# Patient Record
Sex: MALE | Race: AMERICAN INDIAN OR ALASKA NATIVE | NOT HISPANIC OR LATINO | Employment: PART TIME | URBAN - METROPOLITAN AREA
[De-identification: names, ages, dates, MRNs, and addresses within clinical notes are randomized per-mention and may not be internally consistent; named-entity substitution may affect disease eponyms.]

---

## 2024-01-01 ENCOUNTER — TRANSFERRED RECORDS (OUTPATIENT)
Dept: MULTI SPECIALTY CLINIC | Facility: CLINIC | Age: 39
End: 2024-01-01

## 2024-01-01 LAB — RETINOPATHY: NORMAL

## 2024-03-11 ENCOUNTER — APPOINTMENT (OUTPATIENT)
Dept: CT IMAGING | Facility: CLINIC | Age: 39
End: 2024-03-11
Attending: STUDENT IN AN ORGANIZED HEALTH CARE EDUCATION/TRAINING PROGRAM
Payer: MEDICAID

## 2024-03-11 ENCOUNTER — APPOINTMENT (OUTPATIENT)
Dept: CT IMAGING | Facility: CLINIC | Age: 39
End: 2024-03-11
Attending: EMERGENCY MEDICINE
Payer: MEDICAID

## 2024-03-11 ENCOUNTER — HOSPITAL ENCOUNTER (EMERGENCY)
Facility: CLINIC | Age: 39
Discharge: HOME | End: 2024-03-11
Attending: STUDENT IN AN ORGANIZED HEALTH CARE EDUCATION/TRAINING PROGRAM | Admitting: STUDENT IN AN ORGANIZED HEALTH CARE EDUCATION/TRAINING PROGRAM
Payer: MEDICAID

## 2024-03-11 ENCOUNTER — APPOINTMENT (OUTPATIENT)
Dept: MRI IMAGING | Facility: CLINIC | Age: 39
End: 2024-03-11
Attending: STUDENT IN AN ORGANIZED HEALTH CARE EDUCATION/TRAINING PROGRAM
Payer: MEDICAID

## 2024-03-11 VITALS
OXYGEN SATURATION: 96 % | RESPIRATION RATE: 16 BRPM | BODY MASS INDEX: 30.5 KG/M2 | TEMPERATURE: 97.5 F | DIASTOLIC BLOOD PRESSURE: 88 MMHG | HEART RATE: 81 BPM | WEIGHT: 244.05 LBS | SYSTOLIC BLOOD PRESSURE: 133 MMHG

## 2024-03-11 DIAGNOSIS — R73.9 HYPERGLYCEMIA: ICD-10-CM

## 2024-03-11 DIAGNOSIS — R07.89 CHEST TIGHTNESS: ICD-10-CM

## 2024-03-11 DIAGNOSIS — R53.1 RIGHT SIDED WEAKNESS: ICD-10-CM

## 2024-03-11 DIAGNOSIS — R20.0 RIGHT SIDED NUMBNESS: ICD-10-CM

## 2024-03-11 LAB
ANION GAP SERPL CALCULATED.3IONS-SCNC: 14 MMOL/L (ref 7–15)
APTT PPP: 30 SECONDS (ref 22–38)
BASOPHILS # BLD AUTO: 0 10E3/UL (ref 0–0.2)
BASOPHILS NFR BLD AUTO: 1 %
BUN SERPL-MCNC: 18 MG/DL (ref 6–20)
CALCIUM SERPL-MCNC: 9.1 MG/DL (ref 8.6–10)
CHLORIDE SERPL-SCNC: 96 MMOL/L (ref 98–107)
CREAT SERPL-MCNC: 0.73 MG/DL (ref 0.67–1.17)
DEPRECATED HCO3 PLAS-SCNC: 22 MMOL/L (ref 22–29)
EGFRCR SERPLBLD CKD-EPI 2021: >90 ML/MIN/1.73M2
EOSINOPHIL # BLD AUTO: 0.1 10E3/UL (ref 0–0.7)
EOSINOPHIL NFR BLD AUTO: 1 %
ERYTHROCYTE [DISTWIDTH] IN BLOOD BY AUTOMATED COUNT: 13.1 % (ref 10–15)
GLUCOSE BLDC GLUCOMTR-MCNC: 379 MG/DL (ref 70–99)
GLUCOSE SERPL-MCNC: 392 MG/DL (ref 70–99)
HBA1C MFR BLD: 11.4 %
HCT VFR BLD AUTO: 47.6 % (ref 40–53)
HGB BLD-MCNC: 16.5 G/DL (ref 13.3–17.7)
IMM GRANULOCYTES # BLD: 0 10E3/UL
IMM GRANULOCYTES NFR BLD: 0 %
INR PPP: 0.92 (ref 0.85–1.15)
LYMPHOCYTES # BLD AUTO: 2.4 10E3/UL (ref 0.8–5.3)
LYMPHOCYTES NFR BLD AUTO: 34 %
MCH RBC QN AUTO: 29.7 PG (ref 26.5–33)
MCHC RBC AUTO-ENTMCNC: 34.7 G/DL (ref 31.5–36.5)
MCV RBC AUTO: 86 FL (ref 78–100)
MONOCYTES # BLD AUTO: 0.4 10E3/UL (ref 0–1.3)
MONOCYTES NFR BLD AUTO: 5 %
NEUTROPHILS # BLD AUTO: 4.1 10E3/UL (ref 1.6–8.3)
NEUTROPHILS NFR BLD AUTO: 59 %
NRBC # BLD AUTO: 0 10E3/UL
NRBC BLD AUTO-RTO: 0 /100
PLATELET # BLD AUTO: 265 10E3/UL (ref 150–450)
POTASSIUM SERPL-SCNC: 4 MMOL/L (ref 3.4–5.3)
RBC # BLD AUTO: 5.55 10E6/UL (ref 4.4–5.9)
SODIUM SERPL-SCNC: 132 MMOL/L (ref 135–145)
TROPONIN T SERPL HS-MCNC: 10 NG/L
TROPONIN T SERPL HS-MCNC: 12 NG/L
WBC # BLD AUTO: 7.1 10E3/UL (ref 4–11)

## 2024-03-11 PROCEDURE — 99285 EMERGENCY DEPT VISIT HI MDM: CPT | Mod: 25

## 2024-03-11 PROCEDURE — 71275 CT ANGIOGRAPHY CHEST: CPT

## 2024-03-11 PROCEDURE — 82962 GLUCOSE BLOOD TEST: CPT

## 2024-03-11 PROCEDURE — A9585 GADOBUTROL INJECTION: HCPCS | Performed by: EMERGENCY MEDICINE

## 2024-03-11 PROCEDURE — 250N000009 HC RX 250: Performed by: STUDENT IN AN ORGANIZED HEALTH CARE EDUCATION/TRAINING PROGRAM

## 2024-03-11 PROCEDURE — 70553 MRI BRAIN STEM W/O & W/DYE: CPT

## 2024-03-11 PROCEDURE — 85025 COMPLETE CBC W/AUTO DIFF WBC: CPT | Performed by: STUDENT IN AN ORGANIZED HEALTH CARE EDUCATION/TRAINING PROGRAM

## 2024-03-11 PROCEDURE — 70496 CT ANGIOGRAPHY HEAD: CPT

## 2024-03-11 PROCEDURE — 0042T CT HEAD PERFUSION W CONTRAST: CPT

## 2024-03-11 PROCEDURE — 84484 ASSAY OF TROPONIN QUANT: CPT | Performed by: STUDENT IN AN ORGANIZED HEALTH CARE EDUCATION/TRAINING PROGRAM

## 2024-03-11 PROCEDURE — 93005 ELECTROCARDIOGRAM TRACING: CPT

## 2024-03-11 PROCEDURE — 83036 HEMOGLOBIN GLYCOSYLATED A1C: CPT | Performed by: EMERGENCY MEDICINE

## 2024-03-11 PROCEDURE — 255N000002 HC RX 255 OP 636: Performed by: EMERGENCY MEDICINE

## 2024-03-11 PROCEDURE — 80048 BASIC METABOLIC PNL TOTAL CA: CPT | Performed by: STUDENT IN AN ORGANIZED HEALTH CARE EDUCATION/TRAINING PROGRAM

## 2024-03-11 PROCEDURE — 85730 THROMBOPLASTIN TIME PARTIAL: CPT | Performed by: STUDENT IN AN ORGANIZED HEALTH CARE EDUCATION/TRAINING PROGRAM

## 2024-03-11 PROCEDURE — 70450 CT HEAD/BRAIN W/O DYE: CPT

## 2024-03-11 PROCEDURE — 250N000011 HC RX IP 250 OP 636: Performed by: STUDENT IN AN ORGANIZED HEALTH CARE EDUCATION/TRAINING PROGRAM

## 2024-03-11 PROCEDURE — 85610 PROTHROMBIN TIME: CPT | Performed by: STUDENT IN AN ORGANIZED HEALTH CARE EDUCATION/TRAINING PROGRAM

## 2024-03-11 PROCEDURE — 36415 COLL VENOUS BLD VENIPUNCTURE: CPT | Performed by: STUDENT IN AN ORGANIZED HEALTH CARE EDUCATION/TRAINING PROGRAM

## 2024-03-11 RX ORDER — ATORVASTATIN CALCIUM 40 MG/1
40 TABLET, FILM COATED ORAL DAILY
Qty: 30 TABLET | Refills: 0 | Status: ON HOLD | OUTPATIENT
Start: 2024-03-11 | End: 2024-03-26

## 2024-03-11 RX ORDER — IOPAMIDOL 755 MG/ML
500 INJECTION, SOLUTION INTRAVASCULAR ONCE
Status: COMPLETED | OUTPATIENT
Start: 2024-03-11 | End: 2024-03-11

## 2024-03-11 RX ORDER — CLOPIDOGREL BISULFATE 75 MG/1
75 TABLET ORAL DAILY
Qty: 30 TABLET | Refills: 0 | Status: ON HOLD | OUTPATIENT
Start: 2024-03-11 | End: 2024-03-26

## 2024-03-11 RX ORDER — GADOBUTROL 604.72 MG/ML
11 INJECTION INTRAVENOUS ONCE
Status: COMPLETED | OUTPATIENT
Start: 2024-03-11 | End: 2024-03-11

## 2024-03-11 RX ADMIN — IOPAMIDOL 100 ML: 755 INJECTION, SOLUTION INTRAVENOUS at 17:03

## 2024-03-11 RX ADMIN — SODIUM CHLORIDE 100 ML: 9 INJECTION, SOLUTION INTRAVENOUS at 17:03

## 2024-03-11 RX ADMIN — SODIUM CHLORIDE 100 ML: 9 INJECTION, SOLUTION INTRAVENOUS at 17:04

## 2024-03-11 RX ADMIN — GADOBUTROL 11 ML: 604.72 INJECTION INTRAVENOUS at 20:19

## 2024-03-11 RX ADMIN — SODIUM CHLORIDE 80 ML: 9 INJECTION, SOLUTION INTRAVENOUS at 16:47

## 2024-03-11 RX ADMIN — IOPAMIDOL 67 ML: 755 INJECTION, SOLUTION INTRAVENOUS at 16:46

## 2024-03-11 ASSESSMENT — COLUMBIA-SUICIDE SEVERITY RATING SCALE - C-SSRS
2. HAVE YOU ACTUALLY HAD ANY THOUGHTS OF KILLING YOURSELF IN THE PAST MONTH?: NO
1. IN THE PAST MONTH, HAVE YOU WISHED YOU WERE DEAD OR WISHED YOU COULD GO TO SLEEP AND NOT WAKE UP?: NO
6. HAVE YOU EVER DONE ANYTHING, STARTED TO DO ANYTHING, OR PREPARED TO DO ANYTHING TO END YOUR LIFE?: NO

## 2024-03-11 ASSESSMENT — ACTIVITIES OF DAILY LIVING (ADL)
ADLS_ACUITY_SCORE: 35

## 2024-03-11 NOTE — ED TRIAGE NOTES
Right sided facial numbness and expressive aphasia that started about 1 hour ago. Aphasia present in triage.

## 2024-03-11 NOTE — ED PROVIDER NOTES
History     Chief Complaint:  Aphasia and Numbness       HPI   Deni Coates is a 38 year old male history of diabetes, hypertension, prior CVA and received tPA, on aspirin and clopidogrel, presents with concerns of right-sided facial numbness, arm and leg numbness, right-sided weakness, and difficulty speaking that started roughly between 12 and 1230.  No vision changes.  Also has had mild chest tightness in the center of her chest that developed over the last hour that does not radiate anywhere else.  No vomiting, diaphoresis, exertional symptoms, shortness of breath.     Patient has been taking his aspirin, but had been up to taking his clopidogrel for the last couple weeks until today as he had lost it.      Independent Historian:    none    Review of External Notes:  none    Allergies:  No Known Allergies     Physical Exam   Patient Vitals for the past 24 hrs:   BP Temp Pulse Resp SpO2 Weight   03/11/24 1803 -- -- 96 -- 97 % --   03/11/24 1800 136/89 -- 93 -- 97 % --   03/11/24 1750 -- -- 88 -- 97 % --   03/11/24 1745 (!) 146/81 -- 96 -- 98 % --   03/11/24 1735 130/74 -- 85 -- 96 % --   03/11/24 1720 136/81 -- 94 -- 96 % --   03/11/24 1712 -- -- 89 -- 96 % --   03/11/24 1707 (!) 149/91 -- 80 -- 98 % --   03/11/24 1636 (!) 202/132 97.5  F (36.4  C) 107 16 99 % 110.7 kg (244 lb 0.8 oz)        Physical Exam  GENERAL: Patient well-appearing  HEAD: Atraumatic.  EYES: Anicteric  NOSE: No active bleeding  MOUTH: Moist mucosa  THROAT: Patent airway.   NECK: No rigidity  CV: RRR, no murmurs rubs or gallops  PULM: CTAB with good aeration; no retractions, rales, rhonchi, or wheezing  ABD: Soft, nontender, nondistended, no guarding, no peritoneal signs   DERM: No rash. Skin warm and dry  EXTREMITY: Moving all extremities without difficulty. No calf tenderness or peripheral edema  VASCULAR: Symmetric pulses bilaterally  NEURO: A,Ox3.  Patient speaks slowly but is clear and without aphasia. CN 2-12 fully intact other  than subjective numbness on the right side of the face. Strength 5/5 left upper and lower extremity.  Strength 4+ out of 5 throughout right upper extremity and right lower extremity.  Sensation fully intact to light touch symmetrically bilateral LE/UE. Normal finger-to-nose and heel to shin.  When testing pronator drift, right arm start slightly lower than left at start, but does not drift.        Emergency Department Course   ECG  ECG interpreted by me.  Time 1710  NSR at 88. No ST elevation or depression. Normal intervals. Normal axis.   No evidence of WPW, Brugada, HOCM, ARVD, ASD, or Wellen's.          Laboratory: Imaging:   Labs Ordered and Resulted from Time of ED Arrival to Time of ED Departure   BASIC METABOLIC PANEL - Abnormal       Result Value    Sodium 132 (*)     Potassium 4.0      Chloride 96 (*)     Carbon Dioxide (CO2) 22      Anion Gap 14      Urea Nitrogen 18.0      Creatinine 0.73      GFR Estimate >90      Calcium 9.1      Glucose 392 (*)    GLUCOSE BY METER - Abnormal    GLUCOSE BY METER POCT 379 (*)    INR - Normal    INR 0.92     PARTIAL THROMBOPLASTIN TIME - Normal    aPTT 30     TROPONIN T, HIGH SENSITIVITY - Normal    Troponin T, High Sensitivity 12     CBC WITH PLATELETS AND DIFFERENTIAL    WBC Count 7.1      RBC Count 5.55      Hemoglobin 16.5      Hematocrit 47.6      MCV 86      MCH 29.7      MCHC 34.7      RDW 13.1      Platelet Count 265      % Neutrophils 59      % Lymphocytes 34      % Monocytes 5      % Eosinophils 1      % Basophils 1      % Immature Granulocytes 0      NRBCs per 100 WBC 0      Absolute Neutrophils 4.1      Absolute Lymphocytes 2.4      Absolute Monocytes 0.4      Absolute Eosinophils 0.1      Absolute Basophils 0.0      Absolute Immature Granulocytes 0.0      Absolute NRBCs 0.0     GLUCOSE MONITOR NURSING POCT     CTA Chest with Contrast   Final Result   IMPRESSION:   1.  Thoracic aorta thought to be unremarkable with motion artifact in the ascending thoracic  aorta.   2.  Both lungs appear normal without consolidation or other abnormality.   3.  Fatty infiltration throughout the visible liver.         CT Head Perfusion w Contrast   Final Result   IMPRESSION:    1.  No evidence of core infarction.   2.  The 6 mL area of apparent hypoperfusion centered in the inferomedial right frontal lobes and right frontal periventricular white matter may be artifactual, as above.      Results discussed with Dr. Renny Varghese on 3/11/2024 5:18 PM CDT.         CTA Head Neck with Contrast   Final Result   CONCLUSION:    HEAD CT:   1.  No evidence of acute hemorrhage, mass effect, or acute vascular territorial infarction. Consider MRI for further evaluation, as clinically appropriate.      HEAD CTA:    1.  No evidence of proximal large vessel occlusion or flow-limiting stenosis.      NECK CTA:   1.  No hemodynamically significant stenosis based on NASCET criteria.   2.  No evidence for dissection.      Results discussed with Dr. Varghese on 3/11/2024 5:18 PM CDT.      CT Head w/o Contrast   Final Result   CONCLUSION:    HEAD CT:   1.  No evidence of acute hemorrhage, mass effect, or acute vascular territorial infarction. Consider MRI for further evaluation, as clinically appropriate.      HEAD CTA:    1.  No evidence of proximal large vessel occlusion or flow-limiting stenosis.      NECK CTA:   1.  No hemodynamically significant stenosis based on NASCET criteria.   2.  No evidence for dissection.      Results discussed with Dr. Varghese on 3/11/2024 5:18 PM CDT.      MR Brain w/o & w Contrast    (Results Pending)              Emergency Department Course & Assessments:             Interventions:  Medications   sodium chloride 0.9 % bag 100 mL for CT scan flush use (100 mLs As instructed $Given 3/11/24 1706)   sodium chloride 0.9 % bag 100 mL for CT scan flush use (100 mLs As instructed $Given 3/11/24 1704)   iopamidol (ISOVUE-370) solution 500 mL (67 mLs Intravenous $Given 3/11/24 6140)   iopamidol  (ISOVUE-370) solution 500 mL (100 mLs Intravenous $Given 3/11/24 3096)        Assessments, Independent Interpretation, Consult/Discussion of ManagementTests:   Discussed with stroke neurology Dr. Jacobsen regarding workup and treatment.    Social Determinants of Health affecting care:  None    Disposition:  Pending MRI/stroke Neurology    Impression & Plan         Medical Decision Making:  Symptoms concerning for ischemic stroke.  Chronic condition complicating -diabetes.  Prior CVA.  DDx considered, but not limited to intracranial bleed, vascular dissection, metabolic abnormality, meningitis.  POC glucose of 80.  Stroke activated from triage.  Prompt CT head and CTA head/neck negative for acute process.  On the CT perfusion there was a questionable perfusion deficit but I discussed with radiology it appears to be artifact.  On initial assessment he had subjective right-sided numbness and 4+ out of 5 strength in the right arm and right leg with some slow speech.  On repeat assessments, all symptoms have resolved.  He is speaking without issue.  Strength is 5 out of 5 throughout and he feels much better.  Discussed with neurologist Dr. Jacobsen who recommended MRI.  Patient initially declined MRI as he has some claustrophobia.  He does not want any medications to facilitate MRI.  After multiple discussions he is content with attempting MRI.  Symptoms improved now.  tPA not indicated.  Furthermore patient was initially outside of the window.  Turned over to oncoming provider pending repeat troponin and brain MRI.  Plan is for discussion with neurology after MRI.    Diagnosis:    ICD-10-CM    1. Right sided numbness  R20.0       2. Right sided weakness  R53.1       3. Chest tightness  R07.89       4. Hyperglycemia  R73.9            Discharge Medications:  New Prescriptions    No medications on file          3/11/2024   Renny Varghese MD Foss, Kevin, MD  03/11/24 5732

## 2024-03-11 NOTE — CONSULTS
[facility]    Stroke Telephone Note    I was called by Renny Varghese on 03/11/24 regarding patient Deni Coates. The patient is a 38 year old male with HTN, DM, obesity who starting feeling nauseous around noon today. This was followed by R sided numbness and RULE weakness, difficulty speaking and then feeling of chest tightness. On ED MD exam mild RUE pronator drift, decreased sensation on R, slow to speak. 202/132    Vitals  BP: (!) 149/91   Pulse: 89   Resp: 16   Temp: 97.5  F (36.4  C)   Weight: 110.7 kg (244 lb 0.8 oz)    Stroke Code Data (for stroke code without tele)  Stroke code activated 03/11/24  1637   Stroke provider first response 03/11/24  1638   Last known normal 03/11/24  1200      Time of discovery (or onset of symptoms)        Head CT read by Stroke Neuro Provider        Was stroke code de-escalated?       1715     Imaging Findings  CT head: mild cerebellar atrophy  CTA head/neck: no evidence of LVO  CTP: no area of hypoperfusion    Intravenous Thrombolysis  Not given due to:   - unclear or unfavorable risk-benefit profile for extended window thrombolysis beyond the conventional 4.5 hour time window    Endovascular Treatment  Not initiated due to absence of proximal vessel occlusion    Impression  R sided weakness, numbness, nausea, speech difficulty, chest pain    Will get further workup to rule out stroke    Recommendations   Recommend getting MRI brain w wo contrast  Cardiac workup per primary team  Further recommendation pending MRI      My recommendations are based on the information provided over the phone by Deni Coates's in-person providers. They are not intended to replace the clinical judgment of his in-person providers. I was not requested to personally see or examine the patient at this time.     The Stroke Staff is Dr. Andrews.    Shaista Jacobsen MD  Vascular Neurology Fellow    To page me or covering stroke neurology team member, click here:  "AMCOM  Choose \"On Call\" tab at top, then select \"NEUROLOGY/ALL SITES\" from middle drop-down box, press Enter, then look for \"stroke\" or \"telestroke\" for your site.    " Infliximab Counseling:  I discussed with the patient the risks of infliximab including but not limited to myelosuppression, immunosuppression, autoimmune hepatitis, demyelinating diseases, lymphoma, and serious infections.  The patient understands that monitoring is required including a PPD at baseline and must alert us or the primary physician if symptoms of infection or other concerning signs are noted.

## 2024-03-12 ENCOUNTER — TELEPHONE (OUTPATIENT)
Dept: INTERNAL MEDICINE | Facility: CLINIC | Age: 39
End: 2024-03-12
Payer: MEDICAID

## 2024-03-12 LAB
ATRIAL RATE - MUSE: 88 BPM
DIASTOLIC BLOOD PRESSURE - MUSE: NORMAL MMHG
INTERPRETATION ECG - MUSE: NORMAL
P AXIS - MUSE: 32 DEGREES
PR INTERVAL - MUSE: 152 MS
QRS DURATION - MUSE: 102 MS
QT - MUSE: 368 MS
QTC - MUSE: 445 MS
R AXIS - MUSE: 45 DEGREES
SYSTOLIC BLOOD PRESSURE - MUSE: NORMAL MMHG
T AXIS - MUSE: 41 DEGREES
VENTRICULAR RATE- MUSE: 88 BPM

## 2024-03-12 NOTE — PROGRESS NOTES
"  [facility]    Stroke Brief Follow Up Note      Reviewed MRI. No e/o acute stroke. However chart review shows the in 2021, he had experienced Left hemibody numbness and weakness and s/p TPA, symptoms resolved. He had no evidence of stroke on follow up MRI. He has been on antiplatelt regimen since then (initially dapt) now on ASA.    Impression  Transient R sided weakness, numbness, speech difficulty associated with chest pain and nausea    Given h/o CVA plus risk factors, should bring him in for TIA work up     Recommendations  - Neurochecks and Vital Signs every 4 hrs   - Daily aspirin 81 mg for secondary stroke prevention  - Plavix (clopidogrel) 300 mg PO loading dose x 1  - Plavix (clopidogrel) 75 mg PO Daily  - Statin: per lipid profile results (LDL goal <70)  - TTE (with Bubble Study if age 60 yrs or less)  - 24-hour Telemetry  - Bedside Glucose Monitoring  - A1c, Lipid Panel  - PT/OT/SLP  - Stroke Education  - Euthermia, Euglycemia  - SBP goal: normotension    My recommendations are based on the information provided over the phone by Deni Coates's in-person providers. They are not intended to replace the clinical judgment of his in-person providers. I was not requested to personally see or examine the patient at this time.     The Stroke Staff is Dr. Andrews.    Ankush Felton MD  Vascular Neurology Fellow    To page me or covering stroke neurology team member, click here: AMCOM  Choose \"On Call\" tab at top, then select \"NEUROLOGY/ALL SITES\" from middle drop-down box, press Enter, then look for \"stroke\" or \"telestroke\" for your site.   "

## 2024-03-12 NOTE — TELEPHONE ENCOUNTER
Reason for Call:  Appointment Request    Patient requesting this type of appt:  Hospital/ED Follow-Up     Requested provider: Christin physician- prefers [facility].       Reason patient unable to be scheduled: Not within requested timeframe    When does patient want to be seen/preferred time: 3-5 days    Comments: ED f/U: 3/11/2024 (7 hours)  [facility] Emergency Dept   Rose Garcia MD  Last attending  Treatment team Right sided numbness +3 more Aphasia  Numbness    Okay to leave a detailed message?: Yes at Cell number on file:    Telephone Information:   Mobile [phone]       Call taken on 3/12/2024 at 12:10 PM by CECILIA POPE

## 2024-03-12 NOTE — DISCHARGE INSTRUCTIONS
Your symptoms are concerning for a transient ischemic attack, also known as a TIA or mini stroke.  Given your previous history of stroke in the past, you are at higher risk even though you are young.  I and the neurologist recommended staying in the hospital for additional workup, including an echocardiogram.  This could help rule out a clot in your heart, which could be causing your stroke symptoms.  If you develop any new symptoms of stroke, you should return to the emergency department right away.  These would include vision changes, slurred speech, numbness or weakness on one side your body or another, difficulty walking, dizziness, or for any other concerning symptoms.

## 2024-03-12 NOTE — ED PROVIDER NOTES
Sign Out Note     Pt accepted in sign out from: Dr. Abimael Muro pt presented to the ED for: 38-year-old male with history of diabetes, presenting with transient symptoms of difficulty speaking for 30 minutes.  Patient was discussed with stroke neurology, who recommended MRI.  Patient was reluctant to do MRI secondary to claustrophobia.     Plan at time of sign out: MRI pending.  Patient desires to leave AMA without MRI.  8:16 PM  I reevaluated the patient.  MRI is present, ready to take the patient to scan.  He is agreeable to continuing with MRI.    10:32 PM  IMPRESSION:  Negative brain MRI. No acute intracranial finding. No evidence for recent ischemia, intracranial hemorrhage, or mass.      Care of patient during my shift: 2230: I spoke to stroke neurology.  Per Dr. Ankush Felton, the patient received tPA in 2021, and has been on Plavix since then.  Neurology recommended admission for echocardiogram, and further loading with Plavix.  I presented this plan to the patient, but he declines admission.  I outlined that if you were to have a stroke, this could lead to permanent disability or even death.  I recommended admission for echocardiogram, which could diagnose a process such as a ventricular or arterial thrombus.  Patient continues to decline admission.  He has medical competence.   patient admits that he ran out of his medications for the last several weeks, but just started taking them again recently.  He has also been out of insulin, just resumed taking that again as well.  The patient states that he just wants to go home, and have something to eat.  I informed stroke neurology that the patient is declining admission, and outlined the risks for him after that conversation.  He understands that he is leaving AGAINST MEDICAL ADVICE.  I placed a referral for PCP, outpatient echocardiogram, and neurology.  They understand that this does not replace admission, given that these evaluations will take place  over the span of weeks rather than in the next 24 hours they will return to the ED right away for any recurrent symptoms, or new symptoms such as loss of vision, focal numbness or weakness, confusion, or for any other concerns     Plan for patient at this time: Patient discharged home AGAINST MEDICAL ADVICE.    MD Radha Falcon Tracy Lynn, MD  03/12/24 0228       Rose Garcia MD  03/12/24 0229

## 2024-03-19 NOTE — TELEPHONE ENCOUNTER
On Callback, please schedule next available Hosp F/U appt.        Patient Contact    Attempt # 1    Was call answered?  No.  Left message on voicemail with information to call me back.

## 2024-03-20 NOTE — TELEPHONE ENCOUNTER
ED / Discharge Outreach Protocol    Patient Contact    Attempt # 2    Was call answered?  No.  Left message on voicemail with information to call me back.    On call back, please assist with scheduling ED/Hosp follow up visit with next available provider.

## 2024-03-21 NOTE — TELEPHONE ENCOUNTER
Patient Contact    Attempt # 3    Was call answered?  Yes. Pt verified name and date of birth. Appointment scheduled per pt request.

## 2024-03-24 ENCOUNTER — APPOINTMENT (OUTPATIENT)
Dept: CT IMAGING | Facility: CLINIC | Age: 39
End: 2024-03-24
Attending: EMERGENCY MEDICINE
Payer: MEDICAID

## 2024-03-24 ENCOUNTER — HOSPITAL ENCOUNTER (INPATIENT)
Facility: CLINIC | Age: 39
LOS: 2 days | Discharge: HOME | End: 2024-03-26
Attending: EMERGENCY MEDICINE | Admitting: STUDENT IN AN ORGANIZED HEALTH CARE EDUCATION/TRAINING PROGRAM
Payer: MEDICAID

## 2024-03-24 DIAGNOSIS — E11.9 TYPE 2 DIABETES MELLITUS WITHOUT COMPLICATION, WITH LONG-TERM CURRENT USE OF INSULIN (H): ICD-10-CM

## 2024-03-24 DIAGNOSIS — E78.5 HYPERLIPIDEMIA, UNSPECIFIED HYPERLIPIDEMIA TYPE: ICD-10-CM

## 2024-03-24 DIAGNOSIS — I63.9 ACUTE CVA (CEREBROVASCULAR ACCIDENT) (H): ICD-10-CM

## 2024-03-24 DIAGNOSIS — Z79.4 TYPE 2 DIABETES MELLITUS WITHOUT COMPLICATION, WITH LONG-TERM CURRENT USE OF INSULIN (H): ICD-10-CM

## 2024-03-24 DIAGNOSIS — R53.1 RIGHT SIDED WEAKNESS: ICD-10-CM

## 2024-03-24 DIAGNOSIS — G62.9 PERIPHERAL POLYNEUROPATHY: Primary | ICD-10-CM

## 2024-03-24 DIAGNOSIS — I10 PRIMARY HYPERTENSION: ICD-10-CM

## 2024-03-24 PROBLEM — Z86.73 HISTORY OF ISCHEMIC STROKE: Status: ACTIVE | Noted: 2021-03-20

## 2024-03-24 PROBLEM — M86.9 OSTEOMYELITIS OF TOE OF LEFT FOOT (H): Status: ACTIVE | Noted: 2022-12-15

## 2024-03-24 PROBLEM — E11.65 UNCONTROLLED TYPE 2 DIABETES MELLITUS WITH HYPERGLYCEMIA (H): Status: ACTIVE | Noted: 2021-03-20

## 2024-03-24 LAB
ANION GAP SERPL CALCULATED.3IONS-SCNC: 15 MMOL/L (ref 7–15)
APTT PPP: 32 SECONDS (ref 22–38)
ATRIAL RATE - MUSE: 82 BPM
BASOPHILS # BLD AUTO: 0.1 10E3/UL (ref 0–0.2)
BASOPHILS NFR BLD AUTO: 1 %
BUN SERPL-MCNC: 15.7 MG/DL (ref 6–20)
CALCIUM SERPL-MCNC: 9.1 MG/DL (ref 8.6–10)
CHLORIDE SERPL-SCNC: 102 MMOL/L (ref 98–107)
CREAT SERPL-MCNC: 0.66 MG/DL (ref 0.67–1.17)
DEPRECATED HCO3 PLAS-SCNC: 20 MMOL/L (ref 22–29)
DIASTOLIC BLOOD PRESSURE - MUSE: NORMAL MMHG
EGFRCR SERPLBLD CKD-EPI 2021: >90 ML/MIN/1.73M2
EOSINOPHIL # BLD AUTO: 0.1 10E3/UL (ref 0–0.7)
EOSINOPHIL NFR BLD AUTO: 2 %
ERYTHROCYTE [DISTWIDTH] IN BLOOD BY AUTOMATED COUNT: 12.9 % (ref 10–15)
GLUCOSE SERPL-MCNC: 267 MG/DL (ref 70–99)
HCT VFR BLD AUTO: 44.5 % (ref 40–53)
HGB BLD-MCNC: 15 G/DL (ref 13.3–17.7)
IMM GRANULOCYTES # BLD: 0 10E3/UL
IMM GRANULOCYTES NFR BLD: 0 %
INR PPP: 0.89 (ref 0.85–1.15)
INTERPRETATION ECG - MUSE: NORMAL
LYMPHOCYTES # BLD AUTO: 2.1 10E3/UL (ref 0.8–5.3)
LYMPHOCYTES NFR BLD AUTO: 40 %
MCH RBC QN AUTO: 29.5 PG (ref 26.5–33)
MCHC RBC AUTO-ENTMCNC: 33.7 G/DL (ref 31.5–36.5)
MCV RBC AUTO: 88 FL (ref 78–100)
MONOCYTES # BLD AUTO: 0.4 10E3/UL (ref 0–1.3)
MONOCYTES NFR BLD AUTO: 7 %
NEUTROPHILS # BLD AUTO: 2.7 10E3/UL (ref 1.6–8.3)
NEUTROPHILS NFR BLD AUTO: 50 %
NRBC # BLD AUTO: 0 10E3/UL
NRBC BLD AUTO-RTO: 0 /100
P AXIS - MUSE: 38 DEGREES
PLATELET # BLD AUTO: 261 10E3/UL (ref 150–450)
POTASSIUM SERPL-SCNC: 4.2 MMOL/L (ref 3.4–5.3)
PR INTERVAL - MUSE: 144 MS
QRS DURATION - MUSE: 108 MS
QT - MUSE: 366 MS
QTC - MUSE: 427 MS
R AXIS - MUSE: 36 DEGREES
RBC # BLD AUTO: 5.08 10E6/UL (ref 4.4–5.9)
SODIUM SERPL-SCNC: 137 MMOL/L (ref 135–145)
SYSTOLIC BLOOD PRESSURE - MUSE: NORMAL MMHG
T AXIS - MUSE: 48 DEGREES
TROPONIN T SERPL HS-MCNC: 12 NG/L
VENTRICULAR RATE- MUSE: 82 BPM
WBC # BLD AUTO: 5.3 10E3/UL (ref 4–11)

## 2024-03-24 PROCEDURE — 70496 CT ANGIOGRAPHY HEAD: CPT

## 2024-03-24 PROCEDURE — 250N000009 HC RX 250: Performed by: EMERGENCY MEDICINE

## 2024-03-24 PROCEDURE — 80048 BASIC METABOLIC PNL TOTAL CA: CPT | Performed by: EMERGENCY MEDICINE

## 2024-03-24 PROCEDURE — 85041 AUTOMATED RBC COUNT: CPT | Performed by: EMERGENCY MEDICINE

## 2024-03-24 PROCEDURE — 96374 THER/PROPH/DIAG INJ IV PUSH: CPT | Mod: 59

## 2024-03-24 PROCEDURE — 84484 ASSAY OF TROPONIN QUANT: CPT | Performed by: EMERGENCY MEDICINE

## 2024-03-24 PROCEDURE — 82465 ASSAY BLD/SERUM CHOLESTEROL: CPT | Performed by: STUDENT IN AN ORGANIZED HEALTH CARE EDUCATION/TRAINING PROGRAM

## 2024-03-24 PROCEDURE — 250N000009 HC RX 250: Performed by: PSYCHIATRY & NEUROLOGY

## 2024-03-24 PROCEDURE — 93005 ELECTROCARDIOGRAM TRACING: CPT

## 2024-03-24 PROCEDURE — 99291 CRITICAL CARE FIRST HOUR: CPT | Performed by: PSYCHIATRY & NEUROLOGY

## 2024-03-24 PROCEDURE — 250N000011 HC RX IP 250 OP 636: Performed by: PSYCHIATRY & NEUROLOGY

## 2024-03-24 PROCEDURE — 99223 1ST HOSP IP/OBS HIGH 75: CPT | Performed by: STUDENT IN AN ORGANIZED HEALTH CARE EDUCATION/TRAINING PROGRAM

## 2024-03-24 PROCEDURE — 99291 CRITICAL CARE FIRST HOUR: CPT | Mod: 25

## 2024-03-24 PROCEDURE — 36415 COLL VENOUS BLD VENIPUNCTURE: CPT | Performed by: EMERGENCY MEDICINE

## 2024-03-24 PROCEDURE — 70450 CT HEAD/BRAIN W/O DYE: CPT

## 2024-03-24 PROCEDURE — 85730 THROMBOPLASTIN TIME PARTIAL: CPT | Performed by: EMERGENCY MEDICINE

## 2024-03-24 PROCEDURE — 120N000001 HC R&B MED SURG/OB

## 2024-03-24 PROCEDURE — 250N000011 HC RX IP 250 OP 636: Performed by: EMERGENCY MEDICINE

## 2024-03-24 PROCEDURE — 3E03317 INTRODUCTION OF OTHER THROMBOLYTIC INTO PERIPHERAL VEIN, PERCUTANEOUS APPROACH: ICD-10-PCS | Performed by: INTERNAL MEDICINE

## 2024-03-24 PROCEDURE — 85610 PROTHROMBIN TIME: CPT | Performed by: EMERGENCY MEDICINE

## 2024-03-24 RX ORDER — LABETALOL HYDROCHLORIDE 5 MG/ML
10 INJECTION, SOLUTION INTRAVENOUS EVERY 10 MIN PRN
Status: DISCONTINUED | OUTPATIENT
Start: 2024-03-24 | End: 2024-03-26 | Stop reason: HOSPADM

## 2024-03-24 RX ORDER — DEXTROSE MONOHYDRATE 25 G/50ML
25-50 INJECTION, SOLUTION INTRAVENOUS
Status: DISCONTINUED | OUTPATIENT
Start: 2024-03-24 | End: 2024-03-26 | Stop reason: HOSPADM

## 2024-03-24 RX ORDER — IOPAMIDOL 755 MG/ML
67 INJECTION, SOLUTION INTRAVASCULAR ONCE
Status: COMPLETED | OUTPATIENT
Start: 2024-03-24 | End: 2024-03-24

## 2024-03-24 RX ORDER — ASPIRIN 325 MG
325 TABLET, DELAYED RELEASE (ENTERIC COATED) ORAL DAILY
Status: ON HOLD | COMMUNITY
End: 2024-03-26

## 2024-03-24 RX ORDER — INSULIN ASPART 100 [IU]/ML
12-15 INJECTION, SOLUTION INTRAVENOUS; SUBCUTANEOUS
Status: ON HOLD | COMMUNITY
End: 2024-03-26

## 2024-03-24 RX ORDER — PIOGLITAZONEHYDROCHLORIDE 30 MG/1
30 TABLET ORAL DAILY
Status: ON HOLD | COMMUNITY
End: 2024-03-26

## 2024-03-24 RX ORDER — CARVEDILOL 6.25 MG/1
6.25 TABLET ORAL 2 TIMES DAILY WITH MEALS
Status: CANCELLED | OUTPATIENT
Start: 2024-03-24

## 2024-03-24 RX ORDER — LOSARTAN POTASSIUM 25 MG/1
25 TABLET ORAL DAILY
Status: ON HOLD | COMMUNITY
End: 2024-03-26

## 2024-03-24 RX ORDER — HYDRALAZINE HYDROCHLORIDE 20 MG/ML
10 INJECTION INTRAMUSCULAR; INTRAVENOUS EVERY 10 MIN PRN
Status: DISCONTINUED | OUTPATIENT
Start: 2024-03-24 | End: 2024-03-26 | Stop reason: HOSPADM

## 2024-03-24 RX ORDER — GABAPENTIN 300 MG/1
600 CAPSULE ORAL 3 TIMES DAILY
Status: ON HOLD | COMMUNITY
End: 2024-03-26

## 2024-03-24 RX ORDER — CARVEDILOL 6.25 MG/1
6.25 TABLET ORAL 2 TIMES DAILY WITH MEALS
Status: ON HOLD | COMMUNITY
End: 2024-03-26

## 2024-03-24 RX ORDER — LOSARTAN POTASSIUM 25 MG/1
25 TABLET ORAL DAILY
Status: CANCELLED | OUTPATIENT
Start: 2024-03-25

## 2024-03-24 RX ORDER — HYDROMORPHONE HYDROCHLORIDE 1 MG/ML
0.5 INJECTION, SOLUTION INTRAMUSCULAR; INTRAVENOUS; SUBCUTANEOUS ONCE
Status: DISCONTINUED | OUTPATIENT
Start: 2024-03-24 | End: 2024-03-26 | Stop reason: HOSPADM

## 2024-03-24 RX ORDER — ATORVASTATIN CALCIUM 40 MG/1
40 TABLET, FILM COATED ORAL DAILY
Status: CANCELLED | OUTPATIENT
Start: 2024-03-25

## 2024-03-24 RX ORDER — SODIUM CHLORIDE 9 MG/ML
INJECTION, SOLUTION INTRAVENOUS CONTINUOUS PRN
Status: DISCONTINUED | OUTPATIENT
Start: 2024-03-24 | End: 2024-03-26 | Stop reason: HOSPADM

## 2024-03-24 RX ORDER — HYDRALAZINE HYDROCHLORIDE 20 MG/ML
10 INJECTION INTRAMUSCULAR; INTRAVENOUS EVERY 4 HOURS PRN
Status: DISCONTINUED | OUTPATIENT
Start: 2024-03-24 | End: 2024-03-26 | Stop reason: HOSPADM

## 2024-03-24 RX ORDER — GABAPENTIN 300 MG/1
600 CAPSULE ORAL 3 TIMES DAILY
Status: CANCELLED | OUTPATIENT
Start: 2024-03-24

## 2024-03-24 RX ORDER — NICOTINE POLACRILEX 4 MG
15-30 LOZENGE BUCCAL
Status: DISCONTINUED | OUTPATIENT
Start: 2024-03-24 | End: 2024-03-26 | Stop reason: HOSPADM

## 2024-03-24 RX ORDER — PIOGLITAZONEHYDROCHLORIDE 30 MG/1
30 TABLET ORAL DAILY
Status: CANCELLED | OUTPATIENT
Start: 2024-03-25

## 2024-03-24 RX ADMIN — Medication 25 MG: at 18:12

## 2024-03-24 RX ADMIN — IOPAMIDOL 67 ML: 755 INJECTION, SOLUTION INTRAVENOUS at 17:16

## 2024-03-24 RX ADMIN — SODIUM CHLORIDE 90 ML: 9 INJECTION, SOLUTION INTRAVENOUS at 17:16

## 2024-03-24 ASSESSMENT — VISUAL ACUITY
OU: NORMAL ACUITY

## 2024-03-24 ASSESSMENT — ACTIVITIES OF DAILY LIVING (ADL)
ADLS_ACUITY_SCORE: 35

## 2024-03-24 ASSESSMENT — COLUMBIA-SUICIDE SEVERITY RATING SCALE - C-SSRS
1. IN THE PAST MONTH, HAVE YOU WISHED YOU WERE DEAD OR WISHED YOU COULD GO TO SLEEP AND NOT WAKE UP?: NO
2. HAVE YOU ACTUALLY HAD ANY THOUGHTS OF KILLING YOURSELF IN THE PAST MONTH?: NO
6. HAVE YOU EVER DONE ANYTHING, STARTED TO DO ANYTHING, OR PREPARED TO DO ANYTHING TO END YOUR LIFE?: NO

## 2024-03-24 NOTE — ED TRIAGE NOTES
BIBA from home. Started having pain at the base of the neck, tried to lay down and then started having Rt sided weakness, rt drift, and aphasia at approx 1630. Glu 301 pta.      Triage Assessment (Adult)       Row Name 03/24/24 1714          Triage Assessment    Airway WDL WDL        Respiratory WDL    Respiratory WDL WDL        Cardiac WDL    Cardiac WDL WDL        Cognitive/Neuro/Behavioral WDL    Cognitive/Neuro/Behavioral WDL X  rt numbness and aphasia        Pupils (CN II)    Pupil PERRLA yes        [place] Coma Scale    Best Eye Response 4-->(E4) spontaneous     Best Motor Response 6-->(M6) obeys commands     Best Verbal Response 5-->(V5) oriented     [place] Coma Scale Score 15

## 2024-03-24 NOTE — ED NOTES
Bed: ST  Expected date:   Expected time:   Means of arrival:   Comments:  525  39 M stroke hx/nset 1630/pronator drift/slurred speech/BS goo  1713

## 2024-03-24 NOTE — H&P
"[facility]    History and Physical - Hospitalist Service       Date of Admission:  3/24/2024    Assessment & Plan      Deni Coates is a 39 year old male with past medical history significant for Type II DM, HTN, Obesity and prior stroke admitted on 3/24/2024 with stroke-like symptoms.     Stroke symptoms  Hx of ischemic stroke   Pt presented to the ED with R sided weakness since 1400.   * Of note pt was seen in the ED on 3/11 for R sided numbness and R upper and lower extremity weakness, difficulty speaking. CT CTA and MRI were negative for stroke at that time. Symptoms were thought to represent recrudescence in the setting of elevated BP.   * Today, CT head negative for acute abnormalities, CTA negative for large vessel occlusion.   * Stroke neurology consulted in the ED and recommended TNK (administered at 1812)  - Admit to inpatient IM status   - Stroke neurology consult   - Use orderset: \"Ischemic Stroke Post-Thrombolytics/Thrombectomy ICU Admission\"  - Neurochecks and vital signs per post-thrombolytic orders and monitor closely for any evidence of CNS hemorrhage, bleeding, or orolingual angioedema  - Goal  / 105  - Hold all antithrombotic and anticoagulant medications for 24 hrs post-thrombolytic  - Hold pharmacologic VTE prophylaxis for 24 hrs post-thrombolytic  - Statin:  PTA statin  - Repeat Head CT 24 hrs post-thrombolytic  - MRI Brain with and without contrast  - TTE (with Bubble Study if age 60 yrs or less)  - Telemetry, EKG  - Bedside Glucose Monitoring  - A1c, Lipid Panel, Troponin x 3  - PT/OT/SLP  - Stroke Education  - Euthermia, Euglycemia    Type II DM, poorly controlled   Hemoglobin A1C on 3/11/24 was 11.4%. Pt does follow with endocrine. PTA regimen includes lantus 16 units qAM, Aspart 12-15 units TID with meals, and pioglitazone.   - Given NPO status will decrease lantus to 8 units, and hole mealtime aspart -  - Hold Pioglitazone   - MDSSI     Hypertension " "  PTA medications include carvedilol 6.25mg BID, losartan 25mg daily   - Hold PO meds given NPO status   - PRN Hydralazine available for SBP >180.       Diet: NPO for Medical/Clinical Reasons Except for: No Exceptions    DVT Prophylaxis: Pneumatic Compression Devices  Márquez Catheter: Not present  Lines: None     Cardiac Monitoring: None  Code Status:Full Code     Clinically Significant Risk Factors Present on Admission                # Drug Induced Platelet Defect: home medication list includes an antiplatelet medication   # Hypertension: Noted on problem list     # DMII: A1C = 11.4 % (Ref range: <5.7 %) within past 6 months    # Obesity: Estimated body mass index is 31.75 kg/m  as calculated from the following:    Height as of this encounter: 1.905 m (6' 3\").    Weight as of this encounter: 115.2 kg (254 lb).              Disposition Plan           Bettye La MD  Hospitalist Service  [facility]  Securely message with Buddy (more info)  Text page via [employer] Paging/Directory     ______________________________________________________________________    Chief Complaint   R sided facial droop and weakness     History is obtained from the patient    History of Present Illness   Deni Coates is a 39 year old male who presents to the ED with sudden onset R sided facial droop, and right sided upper and lower extremity weakness. He had similar symptoms on 3/11, but at that time is symptoms were not nearly as severe.     When I am evaluating the patient (following TNK administration), he reports significant improvement in his symptoms.     He also reports that he recently quit smoking and quit drinking.       Past Medical History    Past Medical History:   Diagnosis Date    Anxiety     Diabetes mellitus (H)     Hypertension        Past Surgical History   No past surgical history on file.    Prior to Admission Medications   Prior to Admission Medications   Prescriptions Last Dose " Informant Patient Reported? Taking?   ASPIRIN ADULT LOW STRENGTH PO   Yes No   Sig: Take 81 mg by mouth daily   METFORMIN HCL PO   Yes No   atorvastatin (LIPITOR) 40 MG tablet   No No   Sig: Take 1 tablet (40 mg) by mouth daily   clopidogrel (PLAVIX) 75 MG tablet 3/22/2024  No No   Sig: Take 1 tablet (75 mg) by mouth daily for 30 days   lisinopril (PRINIVIL,ZESTRIL) 20 MG tablet   No No   Sig: Take 1 tablet (20 mg) by mouth daily   losartan (COZAAR) 25 MG tablet 3/24/2024  Yes Yes   Sig: Take 25 mg by mouth daily      Facility-Administered Medications: None        Review of Systems    The 10 point Review of Systems is negative other than noted in the HPI or here.     Physical Exam   Vital Signs: Temp: 97.3  F (36.3  C)   BP: (!) 141/97 Pulse: 84   Resp: 14 SpO2: 96 %      Weight: 254 lbs 0 oz    Constitutional: Awake, alert, cooperative, no apparent distress.  Eyes: Conjunctiva and pupils examined and normal. EOMI  HEENT: Moist mucous membranes, normal dentition.  Respiratory: Clear to auscultation bilaterally, no crackles or wheezing.  Cardiovascular: Regular rate and rhythm, normal S1 and S2, and no murmur noted.  GI: Soft, non-distended, non-tender, normal bowel sounds.  Skin: No rashes, no cyanosis, no edema.  Musculoskeletal: No joint swelling, erythema or tenderness.  Neurologic: R sided facial droop. RUE weakness with drift, RLE weakness. Decreased sensation to light touch on face and upper extremity.   Psychiatric: Alert, oriented to person, place and time, no obvious anxiety or depression.    Medical Decision Making       75 MINUTES SPENT BY ME on the date of service doing chart review, history, exam, documentation & further activities per the note.      Data     I have personally reviewed the following data over the past 24 hrs:    5.3  \   15.0   / 261     137 102 15.7 /  267 (H)   4.2 20 (L) 0.66 (L) \     Trop: 12 BNP: N/A     INR:  0.89 PTT:  32   D-dimer:  N/A Fibrinogen:  N/A       Imaging results  reviewed over the past 24 hrs:   Recent Results (from the past 24 hour(s))   CT Head w/o Contrast    Narrative    EXAM: CT HEAD W/O CONTRAST, CTA HEAD NECK W CONTRAST  LOCATION: [facility]  DATE: 3/24/2024    EXAM: CT HEAD W/O CONTRAST, CTA HEAD NECK W CONTRAST  LOCATION: [facility]  DATE: 3/24/2024    INDICATION: Code Stroke to evaluate for potential thrombolysis and thrombectomy. PLEASE READ IMMEDIATELY.  COMPARISON: 03/11/2024 CT, CTA and MRI.  CONTRAST: 67mL Isovue 370  TECHNIQUE: Head and neck CT angiogram with IV contrast. Noncontrast head CT followed by axial helical CT images of the head and neck vessels obtained during the arterial phase of intravenous contrast administration. Axial 2D reconstructed images and   multiplanar 3D MIP reconstructed images of the head and neck vessels were performed by the technologist. Dose reduction techniques were used. All stenosis measurements made according to NASCET criteria unless otherwise specified.    FINDINGS:   NONCONTRAST HEAD CT:   INTRACRANIAL CONTENTS: No intracranial hemorrhage, extraaxial collection, or mass effect.  No CT evidence of acute infarct. Preserved parenchymal attenuation. Normal ventricles and sulci.     VISUALIZED ORBITS/SINUSES/MASTOIDS: No intraorbital abnormality. No paranasal sinus mucosal disease. No middle ear or mastoid effusion.    BONES/SOFT TISSUES: No acute abnormality.    HEAD CTA:  ANTERIOR CIRCULATION: No stenosis/occlusion, aneurysm, or high flow vascular malformation. Standard [place] of [place] anatomy.    POSTERIOR CIRCULATION: No stenosis/occlusion, aneurysm, or high flow vascular malformation. Balanced vertebral arteries supply a normal basilar artery.     DURAL VENOUS SINUSES: Expected enhancement of the major dural venous sinuses.    NECK CTA:  RIGHT CAROTID: No measurable stenosis or dissection.    LEFT CAROTID: No measurable stenosis or dissection.    VERTEBRAL ARTERIES: No  focal stenosis or dissection. Balanced vertebral arteries.    AORTIC ARCH: Classic aortic arch anatomy with no significant stenosis at the origin of the great vessels.    NONVASCULAR STRUCTURES: Unremarkable.      Impression    IMPRESSION:   HEAD CT:  1.  No evidence of acute hemorrhage, hydrocephalus or transcortical infarct. No skull fracture.    HEAD CTA:   1.  No large vessel occlusion. No severe stenosis.  2.  No aneurysm, or high flow vascular malformation identified.    NECK CTA:  1.  No hemodynamically significant stenosis in the neck vessels.   2.  No evidence for dissection.      Findings discussed with Dr. Gatica by me at 1734 hours central time on 03/24/2024   CTA Head Neck with Contrast    Narrative    EXAM: CT HEAD W/O CONTRAST, CTA HEAD NECK W CONTRAST  LOCATION: [facility]  DATE: 3/24/2024    EXAM: CT HEAD W/O CONTRAST, CTA HEAD NECK W CONTRAST  LOCATION: [facility]  DATE: 3/24/2024    INDICATION: Code Stroke to evaluate for potential thrombolysis and thrombectomy. PLEASE READ IMMEDIATELY.  COMPARISON: 03/11/2024 CT, CTA and MRI.  CONTRAST: 67mL Isovue 370  TECHNIQUE: Head and neck CT angiogram with IV contrast. Noncontrast head CT followed by axial helical CT images of the head and neck vessels obtained during the arterial phase of intravenous contrast administration. Axial 2D reconstructed images and   multiplanar 3D MIP reconstructed images of the head and neck vessels were performed by the technologist. Dose reduction techniques were used. All stenosis measurements made according to NASCET criteria unless otherwise specified.    FINDINGS:   NONCONTRAST HEAD CT:   INTRACRANIAL CONTENTS: No intracranial hemorrhage, extraaxial collection, or mass effect.  No CT evidence of acute infarct. Preserved parenchymal attenuation. Normal ventricles and sulci.     VISUALIZED ORBITS/SINUSES/MASTOIDS: No intraorbital abnormality. No paranasal sinus mucosal  disease. No middle ear or mastoid effusion.    BONES/SOFT TISSUES: No acute abnormality.    HEAD CTA:  ANTERIOR CIRCULATION: No stenosis/occlusion, aneurysm, or high flow vascular malformation. Standard [place] of [place] anatomy.    POSTERIOR CIRCULATION: No stenosis/occlusion, aneurysm, or high flow vascular malformation. Balanced vertebral arteries supply a normal basilar artery.     DURAL VENOUS SINUSES: Expected enhancement of the major dural venous sinuses.    NECK CTA:  RIGHT CAROTID: No measurable stenosis or dissection.    LEFT CAROTID: No measurable stenosis or dissection.    VERTEBRAL ARTERIES: No focal stenosis or dissection. Balanced vertebral arteries.    AORTIC ARCH: Classic aortic arch anatomy with no significant stenosis at the origin of the great vessels.    NONVASCULAR STRUCTURES: Unremarkable.      Impression    IMPRESSION:   HEAD CT:  1.  No evidence of acute hemorrhage, hydrocephalus or transcortical infarct. No skull fracture.    HEAD CTA:   1.  No large vessel occlusion. No severe stenosis.  2.  No aneurysm, or high flow vascular malformation identified.    NECK CTA:  1.  No hemodynamically significant stenosis in the neck vessels.   2.  No evidence for dissection.      Findings discussed with Dr. Gatica by me at 1734 hours central time on 03/24/2024

## 2024-03-24 NOTE — CONSULTS
"      [facility]    Stroke Consult Note    Reason for Consult: Stroke Code     Chief Complaint: Stroke Symptoms      HPI  Deni Coates is a 39 year old male with hx of  HTN, DM, obesity  presents to ED with R sided weakness and headache. Pt reports that he has been having since morning then he notice fatigue and at 1400 noticed R sided weakness. Pt reports having hx of stroke and mentioned that he thought it was coming on. On tele examination pt has significant R sided weakness. Pt noted to have hyperglycemia on this admission .    On 3/11 pt was seen in ER for R sided numbness and RULE weakness, difficulty speaking and then feeling of chest tightness.  His BP was elevated. CT CTA and MRI were negative for stroke. Likely represent recrudescence in setting of elevated BP    Imaging Findings  CT head no acute abnormalities  CTA no lvo    Intravenous Thrombolysis  Risks (including potential for bleeding and death), benefits, and alternatives to thrombolytic therapy were discussed with Patient. Delay in responding to initial stroke code page.    Endovascular Treatment  Not initiated due to absence of proximal vessel occlusion    Impression   R sided weakness  Ddx includes stroke, TIA, migraine or recrudescence in setting of elevated blood sugars  Recommendations  - Use orderset: \"Ischemic Stroke Post-Thrombolytics/Thrombectomy ICU Admission\"  - Neurochecks and vital signs per post-thrombolytic orders and monitor closely for any evidence of CNS hemorrhage, bleeding, or orolingual angioedema  - Goal  / 105  - Hold all antithrombotic and anticoagulant medications for 24 hrs post-thrombolytic  - Hold pharmacologic VTE prophylaxis for 24 hrs post-thrombolytic  - Statin:  PTA statin  - Repeat Head CT 24 hrs post-thrombolytic  - MRI Brain with and without contrast  - TTE (with Bubble Study if age 60 yrs or less)  - Telemetry, EKG  - Bedside Glucose Monitoring  - A1c, Lipid Panel, Troponin x " "3  - PT/OT/SLP  - Stroke Education  - Euthermia, Euglycemia  - If pt is doing ok post TNK and does not require parenteral antihypertensives then can be admitted to [facility].  - Check UTOx      Thank you for this consult.      Seamus Burns MD  Vascular Neurology    To page me or covering stroke neurology team member, click here: AMCOM  Choose \"On Call\" tab at top, then select \"NEUROLOGY/ALL SITES\" from middle drop-down box, press Enter, then look for \"stroke\" or \"telestroke\" for your site.  ______________________________________________________    Clinically Significant Risk Factors Present on Admission                # Drug Induced Platelet Defect: home medication list includes an antiplatelet medication   # Hypertension: Noted on problem list     # DMII: A1C = 11.4 % (Ref range: <5.7 %) within past 6 months    # Obesity: Estimated body mass index is 31.75 kg/m  as calculated from the following:    Height as of this encounter: 1.905 m (6' 3\").    Weight as of this encounter: 115.2 kg (254 lb).                 Past Medical History   Past Medical History:   Diagnosis Date    Anxiety     Diabetes mellitus (H)     Hypertension      Past Surgical History   No past surgical history on file.  Medications   Home Meds  Prior to Admission medications    Medication Sig Start Date End Date Taking? Authorizing Provider   losartan (COZAAR) 25 MG tablet Take 25 mg by mouth daily   Yes Reported, Patient   ASPIRIN ADULT LOW STRENGTH PO Take 81 mg by mouth daily    Reported, Patient   atorvastatin (LIPITOR) 40 MG tablet Take 1 tablet (40 mg) by mouth daily 3/11/24   Rose Garcia MD   clopidogrel (PLAVIX) 75 MG tablet Take 1 tablet (75 mg) by mouth daily for 30 days 3/11/24 4/10/24  Rose Garcia MD   lisinopril (PRINIVIL,ZESTRIL) 20 MG tablet Take 1 tablet (20 mg) by mouth daily 5/10/15   Anette Peterson MD   METFORMIN HCL PO     Reported, Patient       Scheduled Meds   tenecteplase  25 mg Intravenous Once "       Infusion Meds   niCARdipine      sodium chloride         PRN Meds  labetalol **OR** hydrALAZINE, niCARdipine, sodium chloride    Allergies   No Known Allergies  Family History   No family history on file.  Social History   Social History     Tobacco Use    Smoking status: Never   Substance Use Topics    Alcohol use: No    Drug use: No       Review of Systems   Review of systems not obtained due to patient factors - critical condition       PHYSICAL EXAMINATION  Temp:  [97.3  F (36.3  C)] 97.3  F (36.3  C)  Pulse:  [] 79  Resp:  [10-18] 10  BP: (152-164)/() 158/106  SpO2:  [98 %-99 %] 98 %     Neuro Exam  Mental Status:  alert, oriented x 3, follows commands, speech clear and fluent, naming and repetition normal  Cranial Nerves:  visual fields intact (tested by nurse), EOMI with normal smooth pursuit, hearing not formally tested but intact to conversation, no dysarthria, shoulder shrug equal bilaterally, tongue protrusion midline, R sided facial droop at times gets better ( during conversations) and R sided facial numbness  Motor:   RUE and RLE drift able to lift antigravity. No motor weakness on LUE and LLE  Reflexes:  unable to test (telestroke)  Sensory:   Decreased sensation to light touch on R hemibody  Coordination:   Ataxia exam confounded by motor weakness on R side  Station/Gait:  unable to test due to telestroke    Dysphagia Screen  Per Nursing    Stroke Scales    NIHSS  1a. Level of Consciousness 0-->Alert, keenly responsive   1b. LOC Questions 0-->Answers both questions correctly   1c. LOC Commands 0-->Performs both tasks correctly   2.   Best Gaze 0-->Normal   3.   Visual 0-->No visual loss   4.   Facial Palsy 2-->Partial paralysis (total or near-total paralysis of lower face)   5a. Motor Arm, Left 2-->Some effort against gravity, limb cannot get to or maintain (if cued) 90 (or 45) degrees, drifts down to bed, but has some effort against gravity   5b. Motor Arm, Right 0-->No drift, limb  "holds 90 (or 45) degrees for full 10 secs   6a. Motor Leg, Left 0-->No drift, leg holds 30 degree position for full 5 secs   6b. Motor Leg, right 2-->Some effort against gravity, leg falls to bed by 5 secs, but has some effort against gravity   7.   Limb Ataxia 1-->Present in one limb   8.   Sensory 1-->Mild-to-moderate sensory loss, patient feels pinprick is less sharp or is dull on the affected side, or there is a loss of superficial pain with pinprick, but patient is aware of being touched   9.   Best Language 0-->No aphasia, normal   10. Dysarthria 0-->Normal   11. Extinction and Inattention  0-->No abnormality   Total 8 (03/24/24 1806)       Modified Long Score (Pre-morbid)  1-No significant disability despite symptoms    Imaging  I personally reviewed all imaging; relevant findings per HPI.     Lab Results Data   CBC  Recent Labs   Lab 03/24/24  1717   WBC 5.3   RBC 5.08   HGB 15.0   HCT 44.5        Basic Metabolic Panel    Recent Labs   Lab 03/24/24  1717      POTASSIUM 4.2   CHLORIDE 102   CO2 20*   BUN 15.7   CR 0.66*   *   XAVIER 9.1     Liver Panel  No results for input(s): \"PROTTOTAL\", \"ALBUMIN\", \"BILITOTAL\", \"ALKPHOS\", \"AST\", \"ALT\", \"BILIDIRECT\" in the last 168 hours.  INR    Recent Labs   Lab Test 03/24/24  1717 03/11/24  1640   INR 0.89 0.92      Lipid Profile  No lab results found.  A1C    Recent Labs   Lab Test 03/11/24  1640   A1C 11.4*     Troponin    Recent Labs   Lab 03/24/24  1717   CTROPT 12          Stroke Code Data Data   Stroke Code Data  (for stroke code with tele)  Stroke code activated 03/24/24  1713   First stroke provider response 03/24/24  1740 (Delay in response due to pager not received.)   Video start time 03/24/24  1746   Video end time 03/24/24  1804   Last known normal 03/24/24  1400   Time of discovery (or onset of symptoms)  03/24/24  1400   Head CT read by Stroke Neuro Provider        Was stroke code de-escalated? No           Telestroke Service " Details  Type of service telemedicine diagnostic assessment of acute neurological changes   Reason telemedicine is appropriate patient requires assessment with a specialist for diagnosis and treatment of neurological symptoms   Mode of transmission secure interactive audio and video communication per Maximo   Originating site (patient location) [facility]    Distant site (provider location) Provider remote site       I personally examined and evaluated the patient today. At the time of my evaluation and management the patient was in critical condition today due to R sided weakness. I personally managed decision of TNK. Key decisions made today included administration of TNK. I spent a total of 48 minutes providing critical care services, evaluating the patient, directing care and reviewing laboratory values and radiologic reports.

## 2024-03-24 NOTE — ED PROVIDER NOTES
History     Chief Complaint:  Stroke Symptoms       HPI   Deni Coates is a 39 year old male with a history of ischemic stroke, diabetes and hypertension who present to the ED via EMS for an evaluation for an evaluation of stroke symptoms. The patient reports that reports that around 1630, he started to have pain at the base of his neck. Notes that he started to notice his right sided weakness when he laying down.  His face was drooping and he could hardly lift his arm and leg up.  Endorses having chest pain upon his arrival to the ED. Denies any drug use and alcohol use.  He was recently seen on 3/11/2024 with similar symptoms although it was mostly numbness and only little bit of weakness.  He had had a stroke back in 2020 look like a small thalamic stroke and there was some right-sided weakness but that apparently had resolved.  This is new tonight.  He has had chest pain off and on and that was a presentation of his last time he was admitted as well.  He is very anxious upon arrival.  Blood sugar was in the 300s and he has diabetes.    Independent Historian:    Patient and EMS, as per HPI.     Review of External Notes:  I reviewed the ER note from 3/11/2024 when he came in with some right-sided numbness.    Medications:    Losartan   Asprin   Atorvastatin   Clopidogrel   Lisinopril   Metformin HCL PO     Past Medical History:    Anxiety   Diabetes   Hypertension   Peripheral Polyneuropathy   History of ischemic stroke   Family history of premature coronary artery disease   Hyperlipidemia     Past Surgical History:   CT coronary angio with calcium score     Physical Exam   Patient Vitals for the past 24 hrs:   BP Temp Pulse Resp SpO2 Height Weight   03/24/24 2200 123/86 -- 77 17 97 % -- --   03/24/24 2119 (!) 117/97 -- 84 18 97 % -- --   03/24/24 2100 126/88 -- 78 18 97 % -- --   03/24/24 2051 138/89 -- 80 14 98 % -- --   03/24/24 2036 135/85 -- 84 10 98 % -- --   03/24/24 2021 131/88 -- 80 19 97 % -- --  "  03/24/24 1954 (!) 142/93 -- 88 11 98 % -- --   03/24/24 1939 135/85 -- 80 14 98 % -- --   03/24/24 1924 (!) 132/91 -- 83 17 97 % -- --   03/24/24 1915 (!) 138/94 -- 81 19 98 % -- --   03/24/24 1900 (!) 143/94 -- 80 14 97 % -- --   03/24/24 1845 (!) 126/94 -- 91 12 96 % -- --   03/24/24 1830 (!) 124/93 -- 84 11 97 % -- --   03/24/24 1828 (!) 148/93 -- 86 12 97 % -- --   03/24/24 1815 (!) 137/105 -- 80 10 97 % -- --   03/24/24 1812 (!) 141/97 -- 84 14 96 % -- --   03/24/24 1810 (!) 147/97 -- -- -- -- -- --   03/24/24 1800 (!) 158/106 -- 79 10 98 % -- --   03/24/24 1745 (!) 155/99 -- 80 14 98 % -- --   03/24/24 1730 (!) 152/101 -- 79 15 99 % -- --   03/24/24 1718 (!) 164/98 97.3  F (36.3  C) 116 18 99 % 1.905 m (6' 3\") 115.2 kg (254 lb)        Physical Exam  Nursing note and vitals reviewed.    Constitutional:  Appears very anxious with right facial droop  HENT:    Nose normal.  No discharge.      Oral mucosa is moist.  Right facial droop.  Eyes:    Conjunctivae are normal without injection.  Pupils are equal.  Cardiovascular:  Normal rate, regular rhythm with normal S1 and S2.      Normal heart sounds and peripheral pulses 2+ and equal.    Pulmonary:  Effort normal and breath sounds clear to auscultation bilaterally.     GI:    Soft. No distension and no mass. No tenderness.   Musculoskeletal:  No edema.  Patient with right-sided arm and leg weakness.  Neurological:   GCS 15. NIH stroke scale score 7. O X 3.  No sensory deficit.   Patient had a hard time doing finger-nose of the right arm could not even get his finger up to my finger.  Also could not get his right heel to the left shin.  He can lift his arm up against gravity on the right but could not get it up high enough to meet the other 1.  He could also lift his right leg off the bed but it was very weak and it fell back to the bed.  Visual fields full. EOMs intact. No diplopia.  Right facial droop.  No slurred speech.   Comprehension and expression of speech " is normal. Mental status and memory normal.   Skin:    Skin is warm and dry. No rash noted. No diaphoresis.      No erythema. No pallor.  No lesions.  Psychiatric:   Behavior is normal. Appropriate mood and affect.     Judgment and thought content normal.         Emergency Department Course   ECG  ECG taken at 1726, ECG read at 1740  Normal sinus rhythm  Normal ECG   When compared with prior ECG, dated 03/24/2024, unchanged.   Rate 82 bpm. WV interval 144 ms. QRS duration 108 ms. QT/QTc 366/427 ms. P-R-T axes 38 36 48.    Imaging:  CTA Head Neck with Contrast   Final Result   IMPRESSION:    HEAD CT:   1.  No evidence of acute hemorrhage, hydrocephalus or transcortical infarct. No skull fracture.      HEAD CTA:    1.  No large vessel occlusion. No severe stenosis.   2.  No aneurysm, or high flow vascular malformation identified.      NECK CTA:   1.  No hemodynamically significant stenosis in the neck vessels.    2.  No evidence for dissection.         Findings discussed with Dr. Gatica by me at 1734 hours central time on 03/24/2024      CT Head w/o Contrast   Final Result   IMPRESSION:    HEAD CT:   1.  No evidence of acute hemorrhage, hydrocephalus or transcortical infarct. No skull fracture.      HEAD CTA:    1.  No large vessel occlusion. No severe stenosis.   2.  No aneurysm, or high flow vascular malformation identified.      NECK CTA:   1.  No hemodynamically significant stenosis in the neck vessels.    2.  No evidence for dissection.         Findings discussed with Dr. Gatica by me at 1734 hours central time on 03/24/2024          Laboratory:  Labs Ordered and Resulted from Time of ED Arrival to Time of ED Departure   BASIC METABOLIC PANEL - Abnormal       Result Value    Sodium 137      Potassium 4.2      Chloride 102      Carbon Dioxide (CO2) 20 (*)     Anion Gap 15      Urea Nitrogen 15.7      Creatinine 0.66 (*)     GFR Estimate >90      Calcium 9.1      Glucose 267 (*)    INR - Normal    INR 0.89      PARTIAL THROMBOPLASTIN TIME - Normal    aPTT 32     TROPONIN T, HIGH SENSITIVITY - Normal    Troponin T, High Sensitivity 12     CBC WITH PLATELETS AND DIFFERENTIAL    WBC Count 5.3      RBC Count 5.08      Hemoglobin 15.0      Hematocrit 44.5      MCV 88      MCH 29.5      MCHC 33.7      RDW 12.9      Platelet Count 261      % Neutrophils 50      % Lymphocytes 40      % Monocytes 7      % Eosinophils 2      % Basophils 1      % Immature Granulocytes 0      NRBCs per 100 WBC 0      Absolute Neutrophils 2.7      Absolute Lymphocytes 2.1      Absolute Monocytes 0.4      Absolute Eosinophils 0.1      Absolute Basophils 0.1      Absolute Immature Granulocytes 0.0      Absolute NRBCs 0.0     GLUCOSE MONITOR NURSING POCT      Emergency Department Course & Assessments:    Interventions:  Medications   sodium chloride 0.9 % infusion (has no administration in time range)   labetalol (NORMODYNE/TRANDATE) injection 10 mg (has no administration in time range)     Or   hydrALAZINE (APRESOLINE) injection 10 mg (has no administration in time range)   niCARdipine 40 mg in 200 mL NS (CARDENE) infusion (has no administration in time range)   HYDROmorphone (PF) (DILAUDID) injection 0.5 mg ( Intravenous Canceled Entry 3/24/24 2215)   insulin glargine (LANTUS PEN) injection 8 Units (has no administration in time range)   glucose gel 15-30 g (has no administration in time range)     Or   dextrose 50 % injection 25-50 mL (has no administration in time range)     Or   glucagon injection 1 mg (has no administration in time range)   hydrALAZINE (APRESOLINE) injection 10 mg (has no administration in time range)   iopamidol (ISOVUE-370) solution 67 mL (67 mLs Intravenous $Given 3/24/24 1716)   sodium chloride 0.9 % CT scan flush use (90 mLs Intravenous $Given 3/24/24 1716)   tenecteplase (TNKase) injection 25 mg (25 mg Intravenous $Given 3/24/24 1812)        Independent Interpretation (X-rays, CTs, rhythm  strip):  none    Assessments/Consultations/Discussion of Management or Tests:  ED Course as of 03/24/24 2249   Sun Mar 24, 2024   1712 I have evaluated the patient    1735 I spoke to Radiologist   1740 I spoke to stroke, Neuro.    1807 I spoke to stroke, Neuro.    1819 I spoke to Dr. La, Hospitalist       Social Determinants of Health affecting care:  None     Disposition:  The patient was admitted to the hospital under the care of Dr. La.    Impression & Plan    CMS Diagnoses: The patient has stroke symptoms:         ED Stroke specific documentation           NIHSS PDF     Patient last known well time: 1630  ED Provider first to bedside at: 1712  CT Results received at: 1735    Thrombolytics:   Risks (including potential for bleeding and death), benefits, and alternatives to thrombolytic therapy were discussed with Patient. Tenecteplase (TNK) administered without delay.    If treating with thrombolytics: Ensure SBP<180 and DBP<105 prior to treatment with thrombolytics.  Administering thrombolytics after treatment with IV labetalol, hydralazine, or nicardipine is reasonable once BP control is established.    Endovascular Retrieval:  Not initiated due to absence of proximal vessel occlusion    [employer] Stroke Scale (Baseline)  Time Performed: 1715     Score    Level of consciousness: (0)   Alert, keenly responsive    LOC questions: (0)   Answers both questions correctly    LOC commands: (0)   Performs both tasks correctly    Best gaze: (0)   Normal    Visual: (0)   No visual loss    Facial palsy: (2)   Partial paralysis (total/near total of lower face)    Motor arm (left): (0)   No drift    Motor arm (right): (2)   Some effort against gravity    Motor leg (left): (0)   No drift    Motor leg (right): (2)   Some effort against gravity    Limb ataxia: (1)   Present in one limb    Sensory: (0)   Normal- no sensory loss    Best language: (0)   Normal- no aphasia    Dysarthria: (0)   Normal     Extinction and inattention: (0)   No abnormality        Total Score:  7        Stroke Mimics were considered (including migraine headache, seizure disorder, hypoglycemia (or hyperglycemia), head or spinal trauma, CNS infection, Toxin ingestion and shock state (e.g. sepsis) .        Medical Decision Making:  Patient comes in with right-sided weakness and facial droop.  Last known well was less than an hour.  A tier 1 code stroke was called.  I reviewed his chart and saw the previous thalamic stroke and recent visit for similar symptoms with negative workup.  Stroke neuro was consulted and CT and CTA were obtained and did not show an obvious acute stroke or bleed but there was an abnormality in the left hemisphere.  Stroke neuro saw the patient and discussed risks and benefits with TNK and because he is in the window and meets criteria, it was given.  His blood pressure is good, he did have a headache before this all started and he gets migraines and I discussed this with the stroke neurologist who felt I could give him some pain medicine he was given 0.5 mg of Dilaudid IV.  Blood work came back normal other than his glucose of 267 which was elevated.  He is in sinus rhythm.  He has been stable so [place] [facility] [place].  Neurochecks overnight.  MRI.  I have admitted the patient to Dr. La.    Critical care minus procedures: 45 minutes    Diagnosis:    ICD-10-CM    1. Acute CVA (cerebrovascular accident) (H)  I63.9       2. Right sided weakness  R53.1            Discharge Medications:  New Prescriptions    No medications on file          Scribe Disclosure:  I, Fabiola Parisi, am serving as a scribe at 6:10 PM on 3/24/2024 to document services personally performed by Rose Gatica MD based on my observations and the provider's statements to me.  3/24/2024   No att. providers found              Rose Gatica MD  03/24/24 [id number]

## 2024-03-25 ENCOUNTER — APPOINTMENT (OUTPATIENT)
Dept: CARDIOLOGY | Facility: CLINIC | Age: 39
End: 2024-03-25
Attending: STUDENT IN AN ORGANIZED HEALTH CARE EDUCATION/TRAINING PROGRAM
Payer: MEDICAID

## 2024-03-25 ENCOUNTER — APPOINTMENT (OUTPATIENT)
Dept: MRI IMAGING | Facility: CLINIC | Age: 39
End: 2024-03-25
Attending: STUDENT IN AN ORGANIZED HEALTH CARE EDUCATION/TRAINING PROGRAM
Payer: MEDICAID

## 2024-03-25 ENCOUNTER — APPOINTMENT (OUTPATIENT)
Dept: CT IMAGING | Facility: CLINIC | Age: 39
End: 2024-03-25
Attending: STUDENT IN AN ORGANIZED HEALTH CARE EDUCATION/TRAINING PROGRAM
Payer: MEDICAID

## 2024-03-25 LAB
ANION GAP SERPL CALCULATED.3IONS-SCNC: 11 MMOL/L (ref 7–15)
APTT PPP: 33 SECONDS (ref 22–38)
BUN SERPL-MCNC: 14.7 MG/DL (ref 6–20)
CALCIUM SERPL-MCNC: 8.9 MG/DL (ref 8.6–10)
CHLORIDE SERPL-SCNC: 104 MMOL/L (ref 98–107)
CHOLEST SERPL-MCNC: 213 MG/DL
CREAT SERPL-MCNC: 0.74 MG/DL (ref 0.67–1.17)
DEPRECATED HCO3 PLAS-SCNC: 23 MMOL/L (ref 22–29)
EGFRCR SERPLBLD CKD-EPI 2021: >90 ML/MIN/1.73M2
ERYTHROCYTE [DISTWIDTH] IN BLOOD BY AUTOMATED COUNT: 13.1 % (ref 10–15)
GLUCOSE BLDC GLUCOMTR-MCNC: 191 MG/DL (ref 70–99)
GLUCOSE BLDC GLUCOMTR-MCNC: 220 MG/DL (ref 70–99)
GLUCOSE BLDC GLUCOMTR-MCNC: 228 MG/DL (ref 70–99)
GLUCOSE BLDC GLUCOMTR-MCNC: 258 MG/DL (ref 70–99)
GLUCOSE BLDC GLUCOMTR-MCNC: 289 MG/DL (ref 70–99)
GLUCOSE SERPL-MCNC: 257 MG/DL (ref 70–99)
HCT VFR BLD AUTO: 42.1 % (ref 40–53)
HDLC SERPL-MCNC: 46 MG/DL
HGB BLD-MCNC: 14.1 G/DL (ref 13.3–17.7)
INR PPP: 0.95 (ref 0.85–1.15)
LDLC SERPL CALC-MCNC: 143 MG/DL
LVEF ECHO: NORMAL
MCH RBC QN AUTO: 29.6 PG (ref 26.5–33)
MCHC RBC AUTO-ENTMCNC: 33.5 G/DL (ref 31.5–36.5)
MCV RBC AUTO: 88 FL (ref 78–100)
NONHDLC SERPL-MCNC: 167 MG/DL
PLATELET # BLD AUTO: 248 10E3/UL (ref 150–450)
POTASSIUM SERPL-SCNC: 4.1 MMOL/L (ref 3.4–5.3)
RBC # BLD AUTO: 4.77 10E6/UL (ref 4.4–5.9)
SODIUM SERPL-SCNC: 138 MMOL/L (ref 135–145)
TRIGL SERPL-MCNC: 121 MG/DL
WBC # BLD AUTO: 5.6 10E3/UL (ref 4–11)

## 2024-03-25 PROCEDURE — 250N000012 HC RX MED GY IP 250 OP 636 PS 637: Performed by: INTERNAL MEDICINE

## 2024-03-25 PROCEDURE — C8929 TTE W OR WO FOL WCON,DOPPLER: HCPCS

## 2024-03-25 PROCEDURE — 85027 COMPLETE CBC AUTOMATED: CPT | Performed by: STUDENT IN AN ORGANIZED HEALTH CARE EDUCATION/TRAINING PROGRAM

## 2024-03-25 PROCEDURE — 250N000013 HC RX MED GY IP 250 OP 250 PS 637: Performed by: PHYSICIAN ASSISTANT

## 2024-03-25 PROCEDURE — 99232 SBSQ HOSP IP/OBS MODERATE 35: CPT | Performed by: INTERNAL MEDICINE

## 2024-03-25 PROCEDURE — 99418 PROLNG IP/OBS E/M EA 15 MIN: CPT | Mod: FS | Performed by: PHYSICIAN ASSISTANT

## 2024-03-25 PROCEDURE — 36415 COLL VENOUS BLD VENIPUNCTURE: CPT | Performed by: STUDENT IN AN ORGANIZED HEALTH CARE EDUCATION/TRAINING PROGRAM

## 2024-03-25 PROCEDURE — 120N000013 HC R&B IMCU

## 2024-03-25 PROCEDURE — 999N000208 ECHOCARDIOGRAM COMPLETE

## 2024-03-25 PROCEDURE — 85610 PROTHROMBIN TIME: CPT | Performed by: STUDENT IN AN ORGANIZED HEALTH CARE EDUCATION/TRAINING PROGRAM

## 2024-03-25 PROCEDURE — 999N000226 HC STATISTIC SLP IP EVAL DEFER

## 2024-03-25 PROCEDURE — 70551 MRI BRAIN STEM W/O DYE: CPT

## 2024-03-25 PROCEDURE — 99223 1ST HOSP IP/OBS HIGH 75: CPT | Mod: FS | Performed by: PHYSICIAN ASSISTANT

## 2024-03-25 PROCEDURE — 93306 TTE W/DOPPLER COMPLETE: CPT | Mod: 26 | Performed by: INTERNAL MEDICINE

## 2024-03-25 PROCEDURE — 255N000002 HC RX 255 OP 636: Performed by: STUDENT IN AN ORGANIZED HEALTH CARE EDUCATION/TRAINING PROGRAM

## 2024-03-25 PROCEDURE — 80048 BASIC METABOLIC PNL TOTAL CA: CPT | Performed by: STUDENT IN AN ORGANIZED HEALTH CARE EDUCATION/TRAINING PROGRAM

## 2024-03-25 PROCEDURE — 70450 CT HEAD/BRAIN W/O DYE: CPT

## 2024-03-25 PROCEDURE — 85730 THROMBOPLASTIN TIME PARTIAL: CPT | Performed by: STUDENT IN AN ORGANIZED HEALTH CARE EDUCATION/TRAINING PROGRAM

## 2024-03-25 PROCEDURE — 999N000111 HC STATISTIC OT IP EVAL DEFER

## 2024-03-25 PROCEDURE — 250N000012 HC RX MED GY IP 250 OP 636 PS 637: Performed by: STUDENT IN AN ORGANIZED HEALTH CARE EDUCATION/TRAINING PROGRAM

## 2024-03-25 PROCEDURE — 250N000013 HC RX MED GY IP 250 OP 250 PS 637: Performed by: INTERNAL MEDICINE

## 2024-03-25 PROCEDURE — 82962 GLUCOSE BLOOD TEST: CPT

## 2024-03-25 RX ORDER — DEXTROSE MONOHYDRATE 25 G/50ML
25-50 INJECTION, SOLUTION INTRAVENOUS
Status: DISCONTINUED | OUTPATIENT
Start: 2024-03-25 | End: 2024-03-25

## 2024-03-25 RX ORDER — NALOXONE HYDROCHLORIDE 0.4 MG/ML
0.2 INJECTION, SOLUTION INTRAMUSCULAR; INTRAVENOUS; SUBCUTANEOUS
Status: DISCONTINUED | OUTPATIENT
Start: 2024-03-25 | End: 2024-03-26 | Stop reason: HOSPADM

## 2024-03-25 RX ORDER — NICOTINE POLACRILEX 4 MG
15-30 LOZENGE BUCCAL
Status: DISCONTINUED | OUTPATIENT
Start: 2024-03-25 | End: 2024-03-25

## 2024-03-25 RX ORDER — CARVEDILOL 6.25 MG/1
6.25 TABLET ORAL 2 TIMES DAILY WITH MEALS
Status: DISCONTINUED | OUTPATIENT
Start: 2024-03-25 | End: 2024-03-26 | Stop reason: HOSPADM

## 2024-03-25 RX ORDER — NALOXONE HYDROCHLORIDE 0.4 MG/ML
0.4 INJECTION, SOLUTION INTRAMUSCULAR; INTRAVENOUS; SUBCUTANEOUS
Status: DISCONTINUED | OUTPATIENT
Start: 2024-03-25 | End: 2024-03-26 | Stop reason: HOSPADM

## 2024-03-25 RX ORDER — ASPIRIN 81 MG/1
81 TABLET ORAL DAILY
Status: DISCONTINUED | OUTPATIENT
Start: 2024-03-25 | End: 2024-03-26 | Stop reason: HOSPADM

## 2024-03-25 RX ORDER — LIDOCAINE 40 MG/G
CREAM TOPICAL
Status: DISCONTINUED | OUTPATIENT
Start: 2024-03-25 | End: 2024-03-26 | Stop reason: HOSPADM

## 2024-03-25 RX ORDER — ATORVASTATIN CALCIUM 40 MG/1
40 TABLET, FILM COATED ORAL EVERY EVENING
Status: DISCONTINUED | OUTPATIENT
Start: 2024-03-25 | End: 2024-03-26 | Stop reason: HOSPADM

## 2024-03-25 RX ADMIN — HUMAN ALBUMIN MICROSPHERES AND PERFLUTREN 9 ML: 10; .22 INJECTION, SOLUTION INTRAVENOUS at 14:11

## 2024-03-25 RX ADMIN — INSULIN ASPART 3 UNITS: 100 INJECTION, SOLUTION INTRAVENOUS; SUBCUTANEOUS at 06:12

## 2024-03-25 RX ADMIN — ATORVASTATIN CALCIUM 40 MG: 40 TABLET, FILM COATED ORAL at 21:31

## 2024-03-25 RX ADMIN — CARVEDILOL 6.25 MG: 6.25 TABLET, FILM COATED ORAL at 17:45

## 2024-03-25 RX ADMIN — INSULIN GLARGINE 12 UNITS: 100 INJECTION, SOLUTION SUBCUTANEOUS at 11:08

## 2024-03-25 RX ADMIN — ASPIRIN 81 MG: 81 TABLET, COATED ORAL at 21:31

## 2024-03-25 ASSESSMENT — ACTIVITIES OF DAILY LIVING (ADL)
ADLS_ACUITY_SCORE: 18
ADLS_ACUITY_SCORE: 18
ADLS_ACUITY_SCORE: 36
ADLS_ACUITY_SCORE: 18
ADLS_ACUITY_SCORE: 36
ADLS_ACUITY_SCORE: 18
ADLS_ACUITY_SCORE: 18
DEPENDENT_IADLS:: INDEPENDENT
ADLS_ACUITY_SCORE: 35
ADLS_ACUITY_SCORE: 18
ADLS_ACUITY_SCORE: 35
ADLS_ACUITY_SCORE: 35
ADLS_ACUITY_SCORE: 18
ADLS_ACUITY_SCORE: 36
ADLS_ACUITY_SCORE: 18
ADLS_ACUITY_SCORE: 36
ADLS_ACUITY_SCORE: 18
ADLS_ACUITY_SCORE: 36
ADLS_ACUITY_SCORE: 18
ADLS_ACUITY_SCORE: 18
ADLS_ACUITY_SCORE: 36
ADLS_ACUITY_SCORE: 18
ADLS_ACUITY_SCORE: 36
ADLS_ACUITY_SCORE: 18

## 2024-03-25 ASSESSMENT — VISUAL ACUITY: OU: NORMAL ACUITY

## 2024-03-25 NOTE — PLAN OF CARE
Reason for Admission: Right sided weakness    Cognitive/Mentation: A/Ox 4  Neuros/CMS: Intact   VS: stableo n RA.   Tele: NSR  GI: Last BM 3/24 Continent.  : Continent.  Pulmonary: LS clear.  Pain: denies.     Drains/Lines: 2 PIV, SL  Skin: intact  Activity: independent.  Diet: regular with thin liquids. Takes pills whole.     Therapies recs: home  Discharge: probably 3/26    Aggression Stoplight Tool: green    End of shift summary: repeat head CT completed-awaiting results. Financial counselor to contact patient.

## 2024-03-25 NOTE — PROGRESS NOTES
Occupational Therapy: Orders received. Chart reviewed and discussed with care team.? Occupational Therapy not indicated due to pt screened. No deficits identified. Pt reports he feels at baseline. Up indp in room with ADL and mobility. Able to stand on 1 foot; L then R. No deficits in strength, coordination, balance, cognition, etc.? Defer discharge recommendations to care team.? Will complete orders.

## 2024-03-25 NOTE — PROGRESS NOTES
SLP:    Order received, chart reviewed. Attempted to see for clinical swallow evaluation, however pt denied c/o dysphagia and changes to speech/communication. He reported no difficulty with PO intake for breakfast this AM/medications. Evaluation deferred at this time given functional status. Please re-consult if/when indicated.

## 2024-03-25 NOTE — ED NOTES
[facility] [place]  ED Nurse Handoff Report    ED Chief complaint: Stroke Symptoms      ED Diagnosis:   Final diagnoses:   Acute CVA (cerebrovascular accident) (H)   Right sided weakness       Code Status: Full Code    Allergies: No Known Allergies    Patient Story: BIBA from home. Started having pain at the base of the neck, tried to lay down and then started having Rt sided weakness, rt drift, and aphasia at approx 1630. Glu 301 pta.    Focused Assessment:  rt sided weakness, rt sided numbness on face and arm. Lt sided tingling rt left. Rt drift. rt FD. Some aphasia on arrival- resolved.     Treatments and/or interventions provided: TNK, CT  Patient's response to treatments and/or interventions:     Abnormal Labs Resulted from Time of ED Arrival to Time of ED Departure   BASIC METABOLIC PANEL - Abnormal       Result Value    Sodium 137      Potassium 4.2      Chloride 102      Carbon Dioxide (CO2) 20 (*)     Anion Gap 15      Urea Nitrogen 15.7      Creatinine 0.66 (*)     GFR Estimate >90      Calcium 9.1      Glucose 267 (*)      CTA Head Neck with Contrast   Final Result   IMPRESSION:    HEAD CT:   1.  No evidence of acute hemorrhage, hydrocephalus or transcortical infarct. No skull fracture.      HEAD CTA:    1.  No large vessel occlusion. No severe stenosis.   2.  No aneurysm, or high flow vascular malformation identified.      NECK CTA:   1.  No hemodynamically significant stenosis in the neck vessels.    2.  No evidence for dissection.         Findings discussed with Dr. Gatica by me at 1734 hours central time on 03/24/2024      CT Head w/o Contrast   Final Result   IMPRESSION:    HEAD CT:   1.  No evidence of acute hemorrhage, hydrocephalus or transcortical infarct. No skull fracture.      HEAD CTA:    1.  No large vessel occlusion. No severe stenosis.   2.  No aneurysm, or high flow vascular malformation identified.      NECK CTA:   1.  No hemodynamically significant stenosis in the neck vessels.     2.  No evidence for dissection.         Findings discussed with Dr. Gatica by me at 1734 hours central time on 03/24/2024            To be done/followed up on inpatient unit:  Neuro Crit consult    Does this patient have any cognitive concerns?:  none    Activity level - Baseline/Home:  Independent  Activity Level - Current:   Stand with Assist    Patient's Preferred language: English   Needed?: No    Isolation: None  Infection: Not Applicable  Patient tested for COVID 19 prior to admission: NO  Bariatric?: No    Vital Signs:   Vitals:    03/24/24 1828 03/24/24 1830 03/24/24 1845 03/24/24 1900   BP: (!) 148/93 (!) 124/93 (!) 126/94 (!) 143/94   Pulse: 86 84 91 80   Resp: 12 11 12 14   Temp:       SpO2: 97% 97% 96% 97%   Weight:       Height:           Cardiac Rhythm:     Was the PSS-3 completed:   Yes  What interventions are required if any?                 For the majority of the shift this patient's behavior was Green.   Behavioral interventions performed were none.    ED NURSE PHONE NUMBER: [phone]

## 2024-03-25 NOTE — PHARMACY-CONSULT NOTE
Pharmacy Consult to evaluate for medication related stroke core measures    Deni Coates, 39 year old male admitted for right sided weakness on 3/24/2024.    Thrombolytic was given on 3/24 at 1812.    VTE Prophylaxis SCDs /PCDs placed on 3/25, as appropriate prior to end of hospital day 2.    Antithrombotic: aspirin started on 3/25 @ 1900, as appropriate by end of hospital day 2. Continue antithrombotic therapy on discharge to meet quality measures, unless contraindicated.    Anticoagulation if history of A-fib/flutter: Patient does not have history of A-fib/flutter - anticoagulation not required for medication related stroke core measures.     LDL Cholesterol Calculated   Date Value Ref Range Status   03/24/2024 143 (H) <=100 mg/dL Final       Patient's home statin, Lipitor (atorvastatin) restarted; continue statin on discharge to meet quality measures, unless contraindicated.     Recommendations: None at this time    Thank you for the consult.    MALACHI HOUGH [facility] 3/25/2024 1:03 PM

## 2024-03-25 NOTE — PROGRESS NOTES
"[facility]    Medicine Progress Note - Hospitalist Service    Date of Admission:  3/24/2024    Assessment & Plan   Deni Coates is a 39 year old male with past medical history significant for Type II DM, HTN, Obesity and prior stroke admitted on 3/24/2024 with right sided weakness and numbness and difficulty speaking.     Right sided hemiparesis- resolved  Hx of ischemic stroke   Possible migraine  [PTA on aspirin 325 mg p.o. daily, Lipitor 40 mg p.o. daily]  Pt presented to the ED with R sided weakness since 1400.   * Of note pt was seen in the ED on 3/11 for R sided numbness and R upper and lower extremity weakness, difficulty speaking. CT CTA and MRI were negative for stroke at that time. Symptoms were thought to represent recrudescence in the setting of elevated BP.   * On admission- CT head negative for acute abnormalities, CTA negative for large vessel occlusion.   * Stroke neurology consulted in the ED and recommended TNK (administered at 1812)  - Admit to inpatient IM status   - Stroke neurology consult   - Use orderset: \"Ischemic Stroke Post-Thrombolytics/Thrombectomy ICU Admission\"  - Neurochecks and vital signs per post-thrombolytic orders and monitor closely for any evidence of CNS hemorrhage, bleeding, or orolingual angioedema  - Goal  / 105  - Hold all antithrombotic and anticoagulant medications for 24 hrs post-thrombolytic  - Hold pharmacologic VTE prophylaxis for 24 hrs post-thrombolytic  - Statin:  PTA statin  - Repeat Head CT 24 hrs post-thrombolytic-pending  - MRI Brain without contrast-no acute infarct  - TTE-EF 55 to 60%, bubble study negative  - Telemetry  - Last hemoglobin A1c was 11.4 on 3/11/2024; needs better control of his diabetes  - His symptoms are resolved, back to baseline at this time  - started on ASA 81 mg po daily   - Neurology feels that stroke was less likely, symptoms likely related to migraine  - Stroke neuro recommended Compazine as " "needed for headache  - Long-term BP goal less than 130/80  - 30 days CardioNet monitor at the time of the discharge to screen for A-fib  - Follow-up with general neurology in 6 to 8 weeks  - Follow-up with psychiatry/psychology as outpatient  - PT/OT/SLP-no needs identified  - Care coordinator consult appreciated     Type II DM, poorly controlled   Hemoglobin A1C on 3/11/24 was 11.4%. Pt does follow with endocrine. PTA regimen includes lantus 16 units qAM, Aspart 12-15 units TID with meals, and pioglitazone.   - Started on Lantus 12 units daily, and aspart 4 units 3 times daily with meals  - Hold PTA pioglitazone   - MDSSI   - Adjust the doses of insulin as indicated     Hypertension   [PTA medications include carvedilol 6.25mg BID, losartan 25mg daily]  - PTA meds held on admission  - Long-term BP goal<130/80  - BP today is 142/92  - PTA Coreg 6.25 mg p.o. twice daily with holding parameters; Losartan still on hold  - PRN Hydralazine available for SBP >180.     Dyslipidemia  [PTA on Lipitor 40 mg p.o. daily]  - Lipid profile with , HDL 46, total cholesterol 213  - Continue PTA Lipitor 40 mg p.o. daily for now and uptitrate as outpatient to achieve LDL goal less than 100          Diet: Moderate Consistent Carb (60 g CHO per Meal) Diet    DVT Prophylaxis: Pneumatic Compression Devices  Márquez Catheter: Not present  Lines: None     Cardiac Monitoring: None  Code Status: Full Code      Clinically Significant Risk Factors Present on Admission                # Drug Induced Platelet Defect: home medication list includes an antiplatelet medication   # Hypertension: Noted on problem list     # DMII: A1C = 11.4 % (Ref range: <5.7 %) within past 6 months    # Obesity: Estimated body mass index is 31.75 kg/m  as calculated from the following:    Height as of this encounter: 1.905 m (6' 3\").    Weight as of this encounter: 115.2 kg (254 lb).              Disposition Plan      Expected Discharge Date: 03/26/2024           "          France Bryant MD  Hospitalist Service  [facility]  Securely message with exozet (more info)  Text page via [employer] Paging/Directory   ______________________________________________________________________    Interval History   Right-sided weakness and speech difficulties resolved, now back to baseline  Denies chest pain, no SOB  No headache, no vision issues  No N/V, no abd pain  Discussed with RN  It seems that the patient does not have medical insurance and needs to talk with financial counselor; will clarify in a.m. if he has his meds and the diabetic supplies at home.    Physical Exam   Vital Signs: Temp: 98.5  F (36.9  C) Temp src: Oral BP: 129/78 Pulse: 68   Resp: 18 SpO2: 97 % O2 Device: None (Room air)    Weight: 254 lbs 0 oz    General Appearance: Awake/alert, no acute distress  Respiratory: Bilateral air entry, no wheezing, no rales, no crackles  Cardiovascular: S1-S2, RRR, no murmurs, no rubs  GI: Abdomen is soft, nontender, BS present  Skin: No rashes, no cyanosis  Neuro: AAOx3, no focal neurological deficits    Medical Decision Making       MANAGEMENT DISCUSSED with the following over the past 24 hours: The patient, RN   NOTE(S)/MEDICAL RECORDS REVIEWED over the past 24 hours: Neurology notes  Tests personally interpreted in the past 24 hours:  - BRAIN MRI showing as above  - Echo showing as above       Data     I have personally reviewed the following data over the past 24 hrs:    5.6  \   14.1   / 248     138 104 14.7 /  258 (H)   4.1 23 0.74 \     Trop: 12 BNP: N/A     INR:  0.95 PTT:  33   D-dimer:  N/A Fibrinogen:  N/A       Imaging results reviewed over the past 24 hrs:   Recent Results (from the past 24 hour(s))   CT Head w/o Contrast    Narrative    EXAM: CT HEAD W/O CONTRAST, CTA HEAD NECK W CONTRAST  LOCATION: [facility]  DATE: 3/24/2024    EXAM: CT HEAD W/O CONTRAST, CTA HEAD NECK W CONTRAST  [facility]  [facility]  DATE: 3/24/2024    INDICATION: Code Stroke to evaluate for potential thrombolysis and thrombectomy. PLEASE READ IMMEDIATELY.  COMPARISON: 03/11/2024 CT, CTA and MRI.  CONTRAST: 67mL Isovue 370  TECHNIQUE: Head and neck CT angiogram with IV contrast. Noncontrast head CT followed by axial helical CT images of the head and neck vessels obtained during the arterial phase of intravenous contrast administration. Axial 2D reconstructed images and   multiplanar 3D MIP reconstructed images of the head and neck vessels were performed by the technologist. Dose reduction techniques were used. All stenosis measurements made according to NASCET criteria unless otherwise specified.    FINDINGS:   NONCONTRAST HEAD CT:   INTRACRANIAL CONTENTS: No intracranial hemorrhage, extraaxial collection, or mass effect.  No CT evidence of acute infarct. Preserved parenchymal attenuation. Normal ventricles and sulci.     VISUALIZED ORBITS/SINUSES/MASTOIDS: No intraorbital abnormality. No paranasal sinus mucosal disease. No middle ear or mastoid effusion.    BONES/SOFT TISSUES: No acute abnormality.    HEAD CTA:  ANTERIOR CIRCULATION: No stenosis/occlusion, aneurysm, or high flow vascular malformation. Standard [place] of [place] anatomy.    POSTERIOR CIRCULATION: No stenosis/occlusion, aneurysm, or high flow vascular malformation. Balanced vertebral arteries supply a normal basilar artery.     DURAL VENOUS SINUSES: Expected enhancement of the major dural venous sinuses.    NECK CTA:  RIGHT CAROTID: No measurable stenosis or dissection.    LEFT CAROTID: No measurable stenosis or dissection.    VERTEBRAL ARTERIES: No focal stenosis or dissection. Balanced vertebral arteries.    AORTIC ARCH: Classic aortic arch anatomy with no significant stenosis at the origin of the great vessels.    NONVASCULAR STRUCTURES: Unremarkable.      Impression    IMPRESSION:   HEAD CT:  1.  No evidence of acute hemorrhage, hydrocephalus or  transcortical infarct. No skull fracture.    HEAD CTA:   1.  No large vessel occlusion. No severe stenosis.  2.  No aneurysm, or high flow vascular malformation identified.    NECK CTA:  1.  No hemodynamically significant stenosis in the neck vessels.   2.  No evidence for dissection.      Findings discussed with Dr. Gatica by me at 1734 hours central time on 03/24/2024   CTA Head Neck with Contrast    Narrative    EXAM: CT HEAD W/O CONTRAST, CTA HEAD NECK W CONTRAST  LOCATION: [facility]  DATE: 3/24/2024    EXAM: CT HEAD W/O CONTRAST, CTA HEAD NECK W CONTRAST  LOCATION: [facility]  DATE: 3/24/2024    INDICATION: Code Stroke to evaluate for potential thrombolysis and thrombectomy. PLEASE READ IMMEDIATELY.  COMPARISON: 03/11/2024 CT, CTA and MRI.  CONTRAST: 67mL Isovue 370  TECHNIQUE: Head and neck CT angiogram with IV contrast. Noncontrast head CT followed by axial helical CT images of the head and neck vessels obtained during the arterial phase of intravenous contrast administration. Axial 2D reconstructed images and   multiplanar 3D MIP reconstructed images of the head and neck vessels were performed by the technologist. Dose reduction techniques were used. All stenosis measurements made according to NASCET criteria unless otherwise specified.    FINDINGS:   NONCONTRAST HEAD CT:   INTRACRANIAL CONTENTS: No intracranial hemorrhage, extraaxial collection, or mass effect.  No CT evidence of acute infarct. Preserved parenchymal attenuation. Normal ventricles and sulci.     VISUALIZED ORBITS/SINUSES/MASTOIDS: No intraorbital abnormality. No paranasal sinus mucosal disease. No middle ear or mastoid effusion.    BONES/SOFT TISSUES: No acute abnormality.    HEAD CTA:  ANTERIOR CIRCULATION: No stenosis/occlusion, aneurysm, or high flow vascular malformation. Standard [place] of Olson anatomy.    POSTERIOR CIRCULATION: No stenosis/occlusion, aneurysm, or high flow vascular  malformation. Balanced vertebral arteries supply a normal basilar artery.     DURAL VENOUS SINUSES: Expected enhancement of the major dural venous sinuses.    NECK CTA:  RIGHT CAROTID: No measurable stenosis or dissection.    LEFT CAROTID: No measurable stenosis or dissection.    VERTEBRAL ARTERIES: No focal stenosis or dissection. Balanced vertebral arteries.    AORTIC ARCH: Classic aortic arch anatomy with no significant stenosis at the origin of the great vessels.    NONVASCULAR STRUCTURES: Unremarkable.      Impression    IMPRESSION:   HEAD CT:  1.  No evidence of acute hemorrhage, hydrocephalus or transcortical infarct. No skull fracture.    HEAD CTA:   1.  No large vessel occlusion. No severe stenosis.  2.  No aneurysm, or high flow vascular malformation identified.    NECK CTA:  1.  No hemodynamically significant stenosis in the neck vessels.   2.  No evidence for dissection.      Findings discussed with Dr. Gatica by me at 1734 hours central time on 03/24/2024   MR Brain w/o Contrast    Narrative    EXAM: MR BRAIN W/O CONTRAST  LOCATION: [facility]  DATE: 3/25/2024    INDICATION: Acute ischemic stroke right-sided weakness and headache  COMPARISON: CT/CTA 03/24/2024  TECHNIQUE: Routine multiplanar multisequence head MRI without intravenous contrast.    FINDINGS:  INTRACRANIAL CONTENTS: No acute or subacute infarct. No mass, acute hemorrhage, or extra-axial fluid collections. Normal brain parenchymal signal. Normal ventricles and sulci. Normal position of the cerebellar tonsils.     SELLA: No abnormality accounting for technique.    OSSEOUS STRUCTURES/SOFT TISSUES: Normal marrow signal. The major intracranial vascular flow voids are maintained.     ORBITS: No abnormality accounting for technique.     SINUSES/MASTOIDS: No paranasal sinus mucosal disease. No middle ear or mastoid effusion.       Impression    IMPRESSION:  1.  Unremarkable MRI of the brain without contrast. No recent  infarct, mass or evidence of intracranial hemorrhage.   Echocardiogram Complete w Bubble study - For age 60 yrs or less   Result Value    LVEF  55-60%    Narrative    [id number]  [id number]  [id number]  2010^DRE^IVAN^MEET     [facility]  Echocardiography Laboratory  [address] [place] [address]     Name: MIKE HOFFMANN  MRN: [id number]  : 1985  Study Date: 2024 01:56 PM  Age: 39 yrs  Gender: Male  Patient Location: [facility]  Reason For Study: Cerebrovascular Incident  Ordering Physician: IVAN ERICKSON  Referring Physician: [facility]  Performed By: Dianne Carmichael     BSA: 2.4 m2  Height: 75 in  Weight: 254 lb  HR: 73  BP: 114/80 mmHg  ______________________________________________________________________________  Procedure  Complete Portable Bubble Echo Adult. [employer]ison (NDC #[id number]) given  intravenously.  ______________________________________________________________________________  Interpretation Summary     A contrast injection (Bubble Study) was performed that was negative for flow  across the interatrial septum.  There is no color Doppler evidence of an atrial shunt.  Left ventricular systolic function is normal.  The visual ejection fraction is 55-60%.  The study was technically difficult.  ______________________________________________________________________________  Left Ventricle  The left ventricle is normal in size. There is normal left ventricular wall  thickness. Diastolic Doppler findings (E/E' ratio and/or other parameters)  suggest left ventricular filling pressures are indeterminate. The visual  ejection fraction is 55-60%. Left ventricular systolic function is normal.     Right Ventricle  The right ventricle is normal in size and function.     Atria  Normal left atrial size. Right atrial size is normal. There is no color  Doppler evidence of an atrial shunt. A contrast injection (Bubble Study) was  performed that was negative  for flow across the interatrial septum.     Mitral Valve  There is trace mitral regurgitation.     Tricuspid Valve  There is trace tricuspid regurgitation.     Aortic Valve  The aortic valve is trileaflet. No aortic regurgitation is present. No  hemodynamically significant valvular aortic stenosis.     Pulmonic Valve  There is no pulmonic valvular regurgitation.     Vessels  The aortic root is normal size. Normal size ascending aorta. IVC diameter <2.1  cm collapsing >50% with sniff suggests a normal RA pressure of 3 mmHg.     Pericardium  There is no pericardial effusion.     Rhythm  Sinus rhythm was noted.  ______________________________________________________________________________  MMode/2D Measurements & Calculations  IVSd: 1.0 cm     LVIDd: 4.6 cm  LVIDs: 2.9 cm  LVPWd: 1.0 cm  FS: 36.7 %  LV mass(C)d: 158.8 grams  LV mass(C)dI: 65.4 grams/m2  Ao root diam: 3.4 cm  LA dimension: 3.7 cm  asc Aorta Diam: 3.1 cm  LA/Ao: 1.1  LVOT diam: 2.2 cm  LVOT area: 3.8 cm2  Ao root diam index Ht(cm/m): 1.8  Ao root diam index BSA (cm/m2): 1.4  Asc Ao diam index BSA (cm/m2): 1.3  Asc Ao diam index Ht(cm/m): 1.6  LA Volume (BP): 57.5 ml     LA Volume Index (BP): 23.7 ml/m2  RWT: 0.43  TAPSE: 2.2 cm     Doppler Measurements & Calculations  MV E max louie: 85.4 cm/sec  MV A max louie: 80.7 cm/sec  MV E/A: 1.1  MV dec time: 0.23 sec  Ao V2 max: 140.0 cm/sec  Ao max P.0 mmHg  Ao V2 mean: 95.2 cm/sec  Ao mean P.0 mmHg  Ao V2 VTI: 28.7 cm  ANDREI(I,D): 3.1 cm2  ANDREI(V,D): 3.4 cm2  LV V1 max P.6 mmHg  LV V1 max: 128.0 cm/sec  LV V1 VTI: 24.0 cm  SV(LVOT): 90.2 ml  SI(LVOT): 37.1 ml/m2  PA acc time: 0.15 sec  AV Louie Ratio (DI): 0.91  ANDREI Index (cm2/m2): 1.3  E/E' av.6  Lateral E/e': 5.7     Medial E/e': 9.5  RV S Louie: 13.1 cm/sec     ______________________________________________________________________________  Report approved by: Esme Vazquez 2024 02:54 PM

## 2024-03-25 NOTE — PLAN OF CARE
PT: Orders received. Chart reviewed and discussed with care team.  PT not indicated due to no needs identified by OT/medical team screening.  Defer discharge recommendations to medical team.  Will complete orders.

## 2024-03-25 NOTE — PLAN OF CARE
Pt here with R sided weakness. A&Ox4. Neuros intact. VSS on RA. Tele NSR. Mod CHO diet, thin liquids. Takes pills whole. Q4 BG checks. Up independently in the room. Continent. 2 PIV, SL.  Denies pain. Pt scoring green on the Aggression Stop Light Tool. Repeat MRI complete Plan for repeat CT and TTE. Discharge pending.

## 2024-03-25 NOTE — CONSULTS
Care Management Initial Consult    General Information  Assessment completed with: Patient,    Type of CM/SW Visit: Offer D/C Planning    Primary Care Provider verified and updated as needed: No (does not have PCP)   Readmission within the last 30 days:        Reason for Consult: discharge planning  Advance Care Planning: Advance Care Planning Reviewed: no concerns identified          Communication Assessment  Patient's communication style: spoken language (English or Bilingual)    Hearing Difficulty or Deaf: no   Wear Glasses or Blind: no    Cognitive  Cognitive/Neuro/Behavioral: WDL  Level of Consciousness: alert  Arousal Level: opens eyes spontaneously  Orientation: oriented x 4  Mood/Behavior: calm, cooperative  Best Language: 0 - No aphasia  Speech: clear, spontaneous, logical    Living Environment:   People in home:       Current living Arrangements: apartment      Able to return to prior arrangements: yes       Family/Social Support:  Care provided by: other (see comments), self  Provides care for:    Marital Status:              Description of Support System:           Current Resources:   Patient receiving home care services:       Community Resources:    Equipment currently used at home: none  Supplies currently used at home:      Employment/Financial:  Employment Status: unemployed        Financial Concerns: other (see comments) (denies)   Referral to Financial Worker: Yes       Does the patient's insurance plan have a 3 day qualifying hospital stay waiver?  No    Lifestyle & Psychosocial Needs:  Social Determinants of Health     Food Insecurity: Not on file   Depression: Not on file   Housing Stability: Not on file   Tobacco Use: Not on file   Financial Resource Strain: Not on file   Alcohol Use: Not on file   Transportation Needs: Not on file   Physical Activity: Not on file   Interpersonal Safety: Not on file   Stress: Not on file   Social Connections: Not on file       Functional  "Status:  Prior to admission patient needed assistance:   Dependent ADLs:: Independent  Dependent IADLs:: Independent       Mental Health Status:          Chemical Dependency Status:                Values/Beliefs:  Spiritual, Cultural Beliefs, Nondenominational Practices, Values that affect care:                 Additional Information:  CM consult for \"stroke.\"  Per chart review, patient was admitted for right sided weakness.  He does have a history of a prior stroke.  Met with patient.  He was not very forthcoming with information.  He states he recently moved to [place].  He states he is  but his emergency contact would be his ex-spouse.  He states he does not currently have medical insurance and had a meeting today about that.  He would be interested in speaking with the financial counselor at hospital.  He has an appointment for hospital follow up at [facility] 3/29 which he will keep.  He denies any discharge concerns or needs.    Message sent to financial counseling.    Lashell Westfall RN, BSN, PHN  Inpatient Care Coordination  [facility]  Phone: [phone]        "

## 2024-03-25 NOTE — PHARMACY-ADMISSION MEDICATION HISTORY
Pharmacist Admission Medication History    Admission medication history is complete. The information provided in this note is only as accurate as the sources available at the time of the update.    Information Source(s): Patient and CareEverywhere/SureScripts via in-person    Pertinent Information: Patient could recall all doses of Rx medications. (Surescripts not available through patient's pharmacy). Actos listed as 15mg daily through CareEverywhere but patient states that he has been increased to 30mg daily.      Changes made to PTA medication list:  Added: Actos, carvedilol, Lantus, Novolog, gabapentin  Deleted: metformin, lisinopril  Changed: Patient states he has been taking 325mg daily of aspirin (vs 81mg daily as listed)    Allergies reviewed with patient and updates made in EHR: yes    Medication History Completed By: Tiffany Garcia, PharmD 3/24/2024 8:01 PM    PTA Med List   Medication Sig Last Dose    aspirin (ASA) 325 MG EC tablet Take 325 mg by mouth daily 3/24/2024 at x650mg    atorvastatin (LIPITOR) 40 MG tablet Take 1 tablet (40 mg) by mouth daily 3/23/2024    carvedilol (COREG) 6.25 MG tablet Take 6.25 mg by mouth 2 times daily (with meals) 3/24/2024 at am x 1 dose    gabapentin (NEURONTIN) 300 MG capsule Take 600 mg by mouth 3 times daily 3/23/2024    insulin aspart (NOVOLOG FLEXPEN) 100 UNIT/ML pen Inject 12-15 Units Subcutaneous 3 times daily (with meals) 3/23/2024    insulin glargine (LANTUS PEN) 100 UNIT/ML pen Inject 16 Units Subcutaneous every morning 3/23/2024 at AM    losartan (COZAAR) 25 MG tablet Take 25 mg by mouth daily 3/24/2024    pioglitazone (ACTOS) 30 MG tablet Take 30 mg by mouth daily 3/24/2024 at AM
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
## 2024-03-26 ENCOUNTER — APPOINTMENT (OUTPATIENT)
Dept: CARDIOLOGY | Facility: CLINIC | Age: 39
End: 2024-03-26
Attending: PHYSICIAN ASSISTANT
Payer: MEDICAID

## 2024-03-26 VITALS
OXYGEN SATURATION: 98 % | BODY MASS INDEX: 31.58 KG/M2 | WEIGHT: 254 LBS | DIASTOLIC BLOOD PRESSURE: 84 MMHG | RESPIRATION RATE: 16 BRPM | TEMPERATURE: 97.8 F | SYSTOLIC BLOOD PRESSURE: 113 MMHG | HEIGHT: 75 IN | HEART RATE: 77 BPM

## 2024-03-26 LAB
GLUCOSE BLDC GLUCOMTR-MCNC: 215 MG/DL (ref 70–99)
GLUCOSE BLDC GLUCOMTR-MCNC: 238 MG/DL (ref 70–99)

## 2024-03-26 PROCEDURE — 999N000096 CARDIAC MOBILE TELEMETRY MONITOR

## 2024-03-26 PROCEDURE — 250N000013 HC RX MED GY IP 250 OP 250 PS 637: Performed by: PHYSICIAN ASSISTANT

## 2024-03-26 PROCEDURE — 99239 HOSP IP/OBS DSCHRG MGMT >30: CPT | Performed by: INTERNAL MEDICINE

## 2024-03-26 PROCEDURE — 93228 REMOTE 30 DAY ECG REV/REPORT: CPT | Performed by: INTERNAL MEDICINE

## 2024-03-26 PROCEDURE — 250N000013 HC RX MED GY IP 250 OP 250 PS 637: Performed by: INTERNAL MEDICINE

## 2024-03-26 RX ORDER — PROCHLORPERAZINE MALEATE 10 MG
10 TABLET ORAL EVERY 6 HOURS PRN
Qty: 15 TABLET | Refills: 0 | Status: SHIPPED | OUTPATIENT
Start: 2024-03-26

## 2024-03-26 RX ORDER — CARVEDILOL 6.25 MG/1
6.25 TABLET ORAL 2 TIMES DAILY WITH MEALS
Qty: 60 TABLET | Refills: 0 | Status: SHIPPED | OUTPATIENT
Start: 2024-03-26 | End: 2024-03-29

## 2024-03-26 RX ORDER — LANCETS
EACH MISCELLANEOUS
Qty: 120 EACH | Refills: 0 | Status: SHIPPED | OUTPATIENT
Start: 2024-03-26

## 2024-03-26 RX ORDER — ASPIRIN 325 MG
325 TABLET, DELAYED RELEASE (ENTERIC COATED) ORAL DAILY
Qty: 30 TABLET | Refills: 0 | Status: SHIPPED | OUTPATIENT
Start: 2024-03-26 | End: 2024-03-29

## 2024-03-26 RX ORDER — LOSARTAN POTASSIUM 25 MG/1
25 TABLET ORAL DAILY
Qty: 30 TABLET | Refills: 0 | Status: SHIPPED | OUTPATIENT
Start: 2024-03-26 | End: 2024-03-29

## 2024-03-26 RX ORDER — ATORVASTATIN CALCIUM 40 MG/1
40 TABLET, FILM COATED ORAL DAILY
Qty: 30 TABLET | Refills: 0 | Status: SHIPPED | OUTPATIENT
Start: 2024-03-26 | End: 2024-03-29

## 2024-03-26 RX ORDER — INSULIN ASPART 100 [IU]/ML
12-15 INJECTION, SOLUTION INTRAVENOUS; SUBCUTANEOUS
Qty: 15 ML | Refills: 0 | Status: SHIPPED | OUTPATIENT
Start: 2024-03-26 | End: 2024-03-29

## 2024-03-26 RX ORDER — PIOGLITAZONEHYDROCHLORIDE 30 MG/1
30 TABLET ORAL DAILY
Qty: 30 TABLET | Refills: 0 | Status: SHIPPED | OUTPATIENT
Start: 2024-03-26 | End: 2024-03-29

## 2024-03-26 RX ORDER — GABAPENTIN 300 MG/1
600 CAPSULE ORAL 3 TIMES DAILY
Qty: 180 CAPSULE | Refills: 0 | Status: SHIPPED | OUTPATIENT
Start: 2024-03-26 | End: 2024-07-02

## 2024-03-26 RX ADMIN — CARVEDILOL 6.25 MG: 6.25 TABLET, FILM COATED ORAL at 08:41

## 2024-03-26 RX ADMIN — INSULIN GLARGINE 12 UNITS: 100 INJECTION, SOLUTION SUBCUTANEOUS at 08:40

## 2024-03-26 RX ADMIN — ASPIRIN 81 MG: 81 TABLET, COATED ORAL at 08:41

## 2024-03-26 ASSESSMENT — ACTIVITIES OF DAILY LIVING (ADL)
ADLS_ACUITY_SCORE: 18

## 2024-03-26 NOTE — PLAN OF CARE
Pt here with R sided weakness, now resolved. Stroke workup negative. A&Ox4. Neuros intact. VSS on RA.  Mod CHO diet, thin liquids. Takes pills whole. Blood glucose checks ACHS. Up independently in the room. Continent. 2 PIV, SL.  Denies pain. Pt scoring green on the Aggression Stop Light Tool. Discharge pending.

## 2024-03-26 NOTE — PROGRESS NOTES
Care Management Discharge Note    Discharge Date: 03/26/2024       Discharge Disposition: Home    Discharge Services:      Discharge DME:      Discharge Transportation:      Private pay costs discussed: Not applicable    Does the patient's insurance plan have a 3 day qualifying hospital stay waiver?  No    PAS Confirmation Code:    Patient/family educated on Medicare website which has current facility and service quality ratings:      Education Provided on the Discharge Plan:    Persons Notified of Discharge Plans:   Patient/Family in Agreement with the Plan:      Handoff Referral Completed: no    Additional Information:  Patient will discharge today.  No further CM interventions anticipated.    Lashell Westfall RN, BSN, PHN  Inpatient Care Coordination  [facility]  Phone: [phone]

## 2024-03-26 NOTE — PROGRESS NOTES
Care Management Follow Up    Length of Stay (days): 2    Expected Discharge Date: 03/26/2024     Concerns to be Addressed:       Patient plan of care discussed at interdisciplinary rounds: Yes    Anticipated Discharge Disposition: Home     Anticipated Discharge Services:    Anticipated Discharge DME:      Patient/family educated on Medicare website which has current facility and service quality ratings:    Education Provided on the Discharge Plan:    Patient/Family in Agreement with the Plan:      Referrals Placed by CM/SW: Financial Services  Private pay costs discussed: Not applicable    Additional Information:  Updated by patient's nurse that he had not been taking his necessary medications prior to admit due to not being able to afford them.  Patient did not share this information with RNCC.   has tried to contact patient but he did not answer call. Sent email to  that patient needs to be contacted today to get assistance process started.    Addendum @ 1152:  Patient has completed medical assistance application with financial counselor.  He stated he would be able to get medications filled for free at [facility] pharmacy in [place].  He just needs to provide them with information that he has applied for medical insurance.  He was provided with a copy of the signature page of the application.  He feels this will be sufficient.  He states since he belongs to a  [employer] he is able to get medications filled at this pharmacy once a month, but they were requiring he apply for medical assistance which he had planned to do the day he was admitted to the hospital.  He would like printed prescriptions at discharge to bring to the pharmacy.    Lashell Westfall RN, BSN, PHN  Inpatient Care Coordination  [facility]  Phone: [phone]

## 2024-03-26 NOTE — DISCHARGE SUMMARY
"[facility]  Hospitalist Discharge Summary      Date of Admission:  3/24/2024  Date of Discharge:  3/26/2024  3:55 PM  Discharging Provider: France Bryant MD  Discharge Service: Hospitalist Service    Discharge Diagnoses   Right sided hemiparesis- resolved  Hx of ischemic stroke   Possible migraine with complex features vs functional neurologic disorder  HTN  Dyslipidemia  DM type 2, poorly controlled      Clinically Significant Risk Factors     # DMII: A1C = 11.4 % (Ref range: <5.7 %) within past 6 months  # Obesity: Estimated body mass index is 31.75 kg/m  as calculated from the following:    Height as of this encounter: 1.905 m (6' 3\").    Weight as of this encounter: 115.2 kg (254 lb).       Follow-ups Needed After Discharge   Follow-up Appointments     Follow-up and recommended labs and tests       Follow up with primary care provider as scheduled  Follow up with Neurology in 6-8 weeks  Follow up with Psychiatry/Psychology.            Unresulted Labs Ordered in the Past 30 Days of this Admission       No orders found from 2/23/2024 to 3/25/2024.        None    Discharge Disposition   Discharged to home  Condition at discharge: Good    Hospital Course   Deni Coates is a 39 year old male with past medical history significant for Type II DM, HTN, Obesity and prior stroke admitted on 3/24/2024 with right sided weakness and numbness and difficulty speaking. For a detailed HPI- please refer to H&P done by Dr Bettye La on 03/24/2024.     Right sided hemiparesis- resolved  Hx of ischemic stroke   Possible migraine with complex features vs functional neurologic disorder  [PTA on aspirin 325 mg p.o. daily, Lipitor 40 mg p.o. daily] but admits he was trying to stretch out all his meds because he had no refills  Pt presented to the ED with R sided weakness.   * Of note pt was seen in the ED on 3/11 for R sided numbness and R upper and lower extremity weakness, difficulty " speaking. CT CTA and MRI were negative for stroke at that time. Symptoms were thought to represent recrudescence in the setting of elevated BP.   * On admission- CT head negative for acute abnormalities, CTA negative for large vessel occlusion.   * Stroke neurology consulted in the ED and recommended TNK (administered at 1812)  - Admitted to inpatient [facility] status for close monitoring after TNK   - Stroke neurology consult   - Neurochecks and vital signs per post-thrombolytic orders and monitor closely for any evidence of CNS hemorrhage, bleeding, or orolingual angioedema  - Repeat Head CT 24 hrs post-thrombolytic- negative  - MRI Brain without contrast -no acute infarct  - TTE-EF 55 to 60%, bubble study negative  - Telemetry  - Last hemoglobin A1c was 11.4 on 3/11/2024; needs better control of his diabetes  - His symptoms are resolved, back to baseline at this time  - given negative stroke workup earlier this month and now again, Neurology feels that stroke was less likely, symptoms likely related to migraine with complex features vs functional neurologic disorder (in the setting of multiple recent life stressors)  - Long-term BP goal less than 130/80  - continue PTA  mg po daily and Lipitor 40 mg daily  - Stroke neuro recommended Compazine as needed for headache  - 30 days CardioNet monitor at the time of the discharge to screen for A-fib  - Follow-up with general neurology in 6 to 8 weeks  - Follow-up with psychiatry/psychology as outpatient  - PT/OT/SLP-no needs identified  - Care coordinator consult appreciated  - new prescriptions for ALL his meds given here, he plans to have it filled at [facility] pharmacy in [place].  He states he is able to get them filled for free at this pharmacy     Type II DM, poorly controlled   Hemoglobin A1C on 3/11/24 was 11.4%. Pt does follow with endocrine. PTA regimen includes lantus 16 units qAM, Aspart 12-15 units TID with meals, and pioglitazone.    - continue PTA regimen after discharge   - a new prescription for all his Insulin orders given to the patient to be filled at above pharmacy; also prescribed a glucometer and supplies.     Hypertension   - continue PTA carvedilol 6.25mg BID, losartan 25mg daily  - long term BP goal <130/80  - follow up with PCP     Dyslipidemia  [PTA on Lipitor 40 mg p.o. daily]  - Lipid profile with , HDL 46, total cholesterol 213  - Continue PTA Lipitor 40 mg p.o. daily for now and uptitrate as outpatient to achieve LDL goal less than 100    Consultations This Hospital Stay   NEUROLOGY IP STROKE CONSULT  SPEECH LANGUAGE PATH ADULT IP CONSULT  PHARMACY IP CONSULT  PHARMACY IP CONSULT  PHARMACY IP CONSULT  PHYSICAL THERAPY ADULT IP CONSULT  OCCUPATIONAL THERAPY ADULT IP CONSULT  REHAB ADMISSIONS LIAISON IP CONSULT  CARE MANAGEMENT / SOCIAL WORK IP CONSULT  SMOKING CESSATION PROGRAM IP CONSULT    Code Status   Full Code    Time Spent on this Encounter   I, France Bryant MD, personally saw the patient today and spent greater than 30 minutes discharging this patient.       France Bryant MD  [facility] NEUROSCIENCE UNIT  [address] [place] [address]  Phone: [phone]  ______________________________________________________________________    Physical Exam   Vital Signs: Temp: 97.8  F (36.6  C) Temp src: Oral BP: 113/84 Pulse: 77   Resp: 16 SpO2: 98 % O2 Device: None (Room air)    Weight: 254 lbs 0 oz  General Appearance:  Awake/alert, no acute distress  Respiratory: Bilateral air entry, no wheezing, no rales, no crackles  Cardiovascular: S1-S2, RRR, no murmurs, no rubs  GI: Abdomen is soft, nontender, BS present  Skin: No rashes, no cyanosis  Neuro: AAOx3, no focal neurological deficits          Primary Care Physician   [facility]    Discharge Orders      Adult Mental Health  Referral      Reason for your hospital stay    You were admitted for evaluation  of right sided weakness. There was initial concern for stroke. Neurology was consulted and you received TNK- a medication that helps dissolving possible blood clots. CT head and MRI brain did not show any stroke. You should continue with all your home medications (new scripts provided at the time of the discharge). Neurology recommended to try Compazine as needed for headache/possible migraines.     Follow-up and recommended labs and tests     Follow up with primary care provider as scheduled  Follow up with Neurology in 6-8 weeks  Follow up with Psychiatry/Psychology.     Activity    Your activity upon discharge: activity as tolerated     When to contact your care team    Call your primary doctor or return to ER if you have any of the following: temperature greater than 100.5 or less than 96, chills, severe headache, dizziness, loss of consciousness, numbness/weakness, facial droop, slurred speech.     Adult Cardiac Mobile Telemetry Monitor     Diet    Follow this diet upon discharge: Orders Placed This Encounter      Moderate Consistent Carb (60 g CHO per Meal) Diet     Stroke Hospital Follow Up (for neurologist use only)    [facility] will call you to coordinate care as prescribed by your provider. If you don t hear from a representative within 2 business days, please call [phone].         Significant Results and Procedures   Most Recent 3 CBC's:  Recent Labs   Lab Test 03/25/24  0704 03/24/24  1717 03/11/24  1640   WBC 5.6 5.3 7.1   HGB 14.1 15.0 16.5   MCV 88 88 86    261 265     Most Recent 3 BMP's:  Recent Labs   Lab Test 03/26/24  1207 03/26/24  0807 03/25/24  2137 03/25/24  1114 03/25/24  0704 03/25/24  0046 03/24/24  1717 03/11/24  1640   NA  --   --   --   --  138  --  137 132*   POTASSIUM  --   --   --   --  4.1  --  4.2 4.0   CHLORIDE  --   --   --   --  104  --  102 96*   CO2  --   --   --   --  23  --  20* 22   BUN  --   --   --   --  14.7  --  15.7 18.0   CR  --   --   --   --   0.74  --  0.66* 0.73   ANIONGAP  --   --   --   --  11  --  15 14   XAVIER  --   --   --   --  8.9  --  9.1 9.1   * 215* 228*   < > 257*   < > 267* 379*  392*    < > = values in this interval not displayed.     Most Recent 2 LFT's:No lab results found.  Most Recent 3 INR's:  Recent Labs   Lab Test 03/25/24  0704 03/24/24  1717 03/11/24  1640   INR 0.95 0.89 0.92     Most Recent 3 Creatinines:  Recent Labs   Lab Test 03/25/24  0704 03/24/24  1717 03/11/24  1640   CR 0.74 0.66* 0.73     Most Recent 3 Hemoglobins:  Recent Labs   Lab Test 03/25/24  0704 03/24/24  1717 03/11/24  1640   HGB 14.1 15.0 16.5     Most Recent 3 Troponin's:No lab results found.  Most Recent Cholesterol Panel:  Recent Labs   Lab Test 03/24/24 1717   CHOL 213*   *   HDL 46   TRIG 121     7-Day Micro Results       No results found for the last 168 hours.          Most Recent TSH and T4:  Recent Labs   Lab Test 09/28/16  2042   TSH 1.23     Most Recent Hemoglobin A1c:  Recent Labs   Lab Test 03/11/24  1640   A1C 11.4*     Most Recent 6 glucoses:  Recent Labs   Lab Test 03/26/24  1207 03/26/24  0807 03/25/24  2137 03/25/24  1814 03/25/24  1114 03/25/24  0704   * 215* 228* 220* 258* 257*     Most Recent Urinalysis:No lab results found.,   Results for orders placed or performed during the hospital encounter of 03/24/24   CT Head w/o Contrast    Narrative    EXAM: CT HEAD W/O CONTRAST, CTA HEAD NECK W CONTRAST  LOCATION: [facility]  DATE: 3/24/2024    EXAM: CT HEAD W/O CONTRAST, CTA HEAD NECK W CONTRAST  LOCATION: [facility]  DATE: 3/24/2024    INDICATION: Code Stroke to evaluate for potential thrombolysis and thrombectomy. PLEASE READ IMMEDIATELY.  COMPARISON: 03/11/2024 CT, CTA and MRI.  CONTRAST: 67mL Isovue 370  TECHNIQUE: Head and neck CT angiogram with IV contrast. Noncontrast head CT followed by axial helical CT images of the head and neck vessels obtained during the  arterial phase of intravenous contrast administration. Axial 2D reconstructed images and   multiplanar 3D MIP reconstructed images of the head and neck vessels were performed by the technologist. Dose reduction techniques were used. All stenosis measurements made according to NASCET criteria unless otherwise specified.    FINDINGS:   NONCONTRAST HEAD CT:   INTRACRANIAL CONTENTS: No intracranial hemorrhage, extraaxial collection, or mass effect.  No CT evidence of acute infarct. Preserved parenchymal attenuation. Normal ventricles and sulci.     VISUALIZED ORBITS/SINUSES/MASTOIDS: No intraorbital abnormality. No paranasal sinus mucosal disease. No middle ear or mastoid effusion.    BONES/SOFT TISSUES: No acute abnormality.    HEAD CTA:  ANTERIOR CIRCULATION: No stenosis/occlusion, aneurysm, or high flow vascular malformation. Standard [place] anatomy.    POSTERIOR CIRCULATION: No stenosis/occlusion, aneurysm, or high flow vascular malformation. Balanced vertebral arteries supply a normal basilar artery.     DURAL VENOUS SINUSES: Expected enhancement of the major dural venous sinuses.    NECK CTA:  RIGHT CAROTID: No measurable stenosis or dissection.    LEFT CAROTID: No measurable stenosis or dissection.    VERTEBRAL ARTERIES: No focal stenosis or dissection. Balanced vertebral arteries.    AORTIC ARCH: Classic aortic arch anatomy with no significant stenosis at the origin of the great vessels.    NONVASCULAR STRUCTURES: Unremarkable.      Impression    IMPRESSION:   HEAD CT:  1.  No evidence of acute hemorrhage, hydrocephalus or transcortical infarct. No skull fracture.    HEAD CTA:   1.  No large vessel occlusion. No severe stenosis.  2.  No aneurysm, or high flow vascular malformation identified.    NECK CTA:  1.  No hemodynamically significant stenosis in the neck vessels.   2.  No evidence for dissection.      Findings discussed with Dr. Gatica by me at 1734 hours central time on 03/24/2024   CTA Head  Neck with Contrast    Narrative    EXAM: CT HEAD W/O CONTRAST, CTA HEAD NECK W CONTRAST  LOCATION: [facility]  DATE: 3/24/2024    EXAM: CT HEAD W/O CONTRAST, CTA HEAD NECK W CONTRAST  LOCATION: [facility]  DATE: 3/24/2024    INDICATION: Code Stroke to evaluate for potential thrombolysis and thrombectomy. PLEASE READ IMMEDIATELY.  COMPARISON: 03/11/2024 CT, CTA and MRI.  CONTRAST: 67mL Isovue 370  TECHNIQUE: Head and neck CT angiogram with IV contrast. Noncontrast head CT followed by axial helical CT images of the head and neck vessels obtained during the arterial phase of intravenous contrast administration. Axial 2D reconstructed images and   multiplanar 3D MIP reconstructed images of the head and neck vessels were performed by the technologist. Dose reduction techniques were used. All stenosis measurements made according to NASCET criteria unless otherwise specified.    FINDINGS:   NONCONTRAST HEAD CT:   INTRACRANIAL CONTENTS: No intracranial hemorrhage, extraaxial collection, or mass effect.  No CT evidence of acute infarct. Preserved parenchymal attenuation. Normal ventricles and sulci.     VISUALIZED ORBITS/SINUSES/MASTOIDS: No intraorbital abnormality. No paranasal sinus mucosal disease. No middle ear or mastoid effusion.    BONES/SOFT TISSUES: No acute abnormality.    HEAD CTA:  ANTERIOR CIRCULATION: No stenosis/occlusion, aneurysm, or high flow vascular malformation. Standard [place] anatomy.    POSTERIOR CIRCULATION: No stenosis/occlusion, aneurysm, or high flow vascular malformation. Balanced vertebral arteries supply a normal basilar artery.     DURAL VENOUS SINUSES: Expected enhancement of the major dural venous sinuses.    NECK CTA:  RIGHT CAROTID: No measurable stenosis or dissection.    LEFT CAROTID: No measurable stenosis or dissection.    VERTEBRAL ARTERIES: No focal stenosis or dissection. Balanced vertebral arteries.    AORTIC ARCH:  Classic aortic arch anatomy with no significant stenosis at the origin of the great vessels.    NONVASCULAR STRUCTURES: Unremarkable.      Impression    IMPRESSION:   HEAD CT:  1.  No evidence of acute hemorrhage, hydrocephalus or transcortical infarct. No skull fracture.    HEAD CTA:   1.  No large vessel occlusion. No severe stenosis.  2.  No aneurysm, or high flow vascular malformation identified.    NECK CTA:  1.  No hemodynamically significant stenosis in the neck vessels.   2.  No evidence for dissection.      Findings discussed with Dr. Gatica by me at 1734 hours central time on 03/24/2024   MR Brain w/o Contrast    Narrative    EXAM: MR BRAIN W/O CONTRAST  LOCATION: [facility]  DATE: 3/25/2024    INDICATION: Acute ischemic stroke right-sided weakness and headache  COMPARISON: CT/CTA 03/24/2024  TECHNIQUE: Routine multiplanar multisequence head MRI without intravenous contrast.    FINDINGS:  INTRACRANIAL CONTENTS: No acute or subacute infarct. No mass, acute hemorrhage, or extra-axial fluid collections. Normal brain parenchymal signal. Normal ventricles and sulci. Normal position of the cerebellar tonsils.     SELLA: No abnormality accounting for technique.    OSSEOUS STRUCTURES/SOFT TISSUES: Normal marrow signal. The major intracranial vascular flow voids are maintained.     ORBITS: No abnormality accounting for technique.     SINUSES/MASTOIDS: No paranasal sinus mucosal disease. No middle ear or mastoid effusion.       Impression    IMPRESSION:  1.  Unremarkable MRI of the brain without contrast. No recent infarct, mass or evidence of intracranial hemorrhage.   CT Head w/o Contrast    Narrative    EXAM: CT HEAD W/O CONTRAST  LOCATION: [facility]  DATE: 3/25/2024    INDICATION: Stroke follow up s/p TNK.  COMPARISON: Brain MRI 03/25/2024.  TECHNIQUE: Routine CT Head without IV contrast. Multiplanar reformats. Dose reduction techniques were  used.    FINDINGS:  INTRACRANIAL CONTENTS: No intracranial hemorrhage, extraaxial collection, or mass effect.  No CT evidence of acute infarct. Normal parenchymal attenuation. Normal ventricles and sulci.     VISUALIZED ORBITS/SINUSES/MASTOIDS: No intraorbital abnormality. No paranasal sinus mucosal disease. No middle ear or mastoid effusion.    BONES/SOFT TISSUES: No acute abnormality.      Impression    IMPRESSION:  1.  Normal head CT.   Echocardiogram Complete w Bubble study - For age 60 yrs or less     Value    LVEF  55-60%    Narrative    [id number]  [id number]  [id number]  2010^DRE^IVAN^MEET     [facility]  Echocardiography Laboratory  [address] [place] [address]     Name: MIKE HOFFMANN  MRN: [id number]  : 1985  Study Date: 2024 01:56 PM  Age: 39 yrs  Gender: Male  Patient Location: [facility]  Reason For Study: Cerebrovascular Incident  Ordering Physician: IVAN ERICKSON  Referring Physician: [facility]  Performed By: Dianne Carmichael     BSA: 2.4 m2  Height: 75 in  Weight: 254 lb  HR: 73  BP: 114/80 mmHg  ______________________________________________________________________________  Procedure  Complete Portable Bubble Echo Adult. [employer]ison (NDC #8711-6531) given  intravenously.  ______________________________________________________________________________  Interpretation Summary     A contrast injection (Bubble Study) was performed that was negative for flow  across the interatrial septum.  There is no color Doppler evidence of an atrial shunt.  Left ventricular systolic function is normal.  The visual ejection fraction is 55-60%.  The study was technically difficult.  ______________________________________________________________________________  Left Ventricle  The left ventricle is normal in size. There is normal left ventricular wall  thickness. Diastolic Doppler findings (E/E' ratio and/or other parameters)  suggest left ventricular  filling pressures are indeterminate. The visual  ejection fraction is 55-60%. Left ventricular systolic function is normal.     Right Ventricle  The right ventricle is normal in size and function.     Atria  Normal left atrial size. Right atrial size is normal. There is no color  Doppler evidence of an atrial shunt. A contrast injection (Bubble Study) was  performed that was negative for flow across the interatrial septum.     Mitral Valve  There is trace mitral regurgitation.     Tricuspid Valve  There is trace tricuspid regurgitation.     Aortic Valve  The aortic valve is trileaflet. No aortic regurgitation is present. No  hemodynamically significant valvular aortic stenosis.     Pulmonic Valve  There is no pulmonic valvular regurgitation.     Vessels  The aortic root is normal size. Normal size ascending aorta. IVC diameter <2.1  cm collapsing >50% with sniff suggests a normal RA pressure of 3 mmHg.     Pericardium  There is no pericardial effusion.     Rhythm  Sinus rhythm was noted.  ______________________________________________________________________________  MMode/2D Measurements & Calculations  IVSd: 1.0 cm     LVIDd: 4.6 cm  LVIDs: 2.9 cm  LVPWd: 1.0 cm  FS: 36.7 %  LV mass(C)d: 158.8 grams  LV mass(C)dI: 65.4 grams/m2  Ao root diam: 3.4 cm  LA dimension: 3.7 cm  asc Aorta Diam: 3.1 cm  LA/Ao: 1.1  LVOT diam: 2.2 cm  LVOT area: 3.8 cm2  Ao root diam index Ht(cm/m): 1.8  Ao root diam index BSA (cm/m2): 1.4  Asc Ao diam index BSA (cm/m2): 1.3  Asc Ao diam index Ht(cm/m): 1.6  LA Volume (BP): 57.5 ml     LA Volume Index (BP): 23.7 ml/m2  RWT: 0.43  TAPSE: 2.2 cm     Doppler Measurements & Calculations  MV E max loy: 85.4 cm/sec  MV A max loy: 80.7 cm/sec  MV E/A: 1.1  MV dec time: 0.23 sec  Ao V2 max: 140.0 cm/sec  Ao max P.0 mmHg  Ao V2 mean: 95.2 cm/sec  Ao mean P.0 mmHg  Ao V2 VTI: 28.7 cm  ANDREI(I,D): 3.1 cm2  ANDREI(V,D): 3.4 cm2  LV V1 max P.6 mmHg  LV V1 max: 128.0 cm/sec  LV V1 VTI: 24.0  cm  SV(LVOT): 90.2 ml  SI(LVOT): 37.1 ml/m2  PA acc time: 0.15 sec  AV Louie Ratio (DI): 0.91  ANDREI Index (cm2/m2): 1.3  E/E' av.6  Lateral E/e': 5.7     Medial E/e': 9.5  RV S Louie: 13.1 cm/sec     ______________________________________________________________________________  Report approved by: Esme Vazquez 2024 02:54 PM             Discharge Medications   Current Discharge Medication List        START taking these medications    Details   blood glucose (NO BRAND SPECIFIED) test strip Use to test blood sugar 4 times daily or as directed.  Qty: 100 strip, Refills: 6    Comments: To accompany: glucometer per insurance.    Future refills by PCP [facility] with phone number [phone].  Associated Diagnoses: Type 2 diabetes mellitus without complication, with long-term current use of insulin (H)      blood glucose monitoring (NO BRAND SPECIFIED) meter device kit Use to test blood sugar 4 times daily or as directed.  Qty: 1 kit, Refills: 0    Comments: Preferred blood glucose meter OR supplies to accompany: glucometer per insurance  Associated Diagnoses: Type 2 diabetes mellitus without complication, with long-term current use of insulin (H)      prochlorperazine (COMPAZINE) 10 MG tablet Take 1 tablet (10 mg) by mouth every 6 hours as needed for other (headache/migraine)  Qty: 15 tablet, Refills: 0    Comments: Future refills by PCP [facility] with phone number [phone].  Associated Diagnoses: Right sided weakness      thin (NO BRAND SPECIFIED) lancets Use with lanceting device.  Qty: 120 each, Refills: 0    Comments: To accompany: per insurance.    Future refills by PCP [facility] with phone number [phone].  Associated Diagnoses: Type 2 diabetes mellitus without complication, with long-term current use of insulin (H)           CONTINUE these medications which have CHANGED    Details   aspirin (ASA) 325 MG EC  tablet Take 1 tablet (325 mg) by mouth daily  Qty: 30 tablet, Refills: 0    Comments: Future refills by PCP [facility] with phone number [phone].  Associated Diagnoses: Acute CVA (cerebrovascular accident) (H)      atorvastatin (LIPITOR) 40 MG tablet Take 1 tablet (40 mg) by mouth daily  Qty: 30 tablet, Refills: 0    Comments: Future refills by PCP [facility] with phone number [phone].  Associated Diagnoses: Hyperlipidemia, unspecified hyperlipidemia type      carvedilol (COREG) 6.25 MG tablet Take 1 tablet (6.25 mg) by mouth 2 times daily (with meals)  Qty: 60 tablet, Refills: 0    Comments: Future refills by PCP [facility] with phone number [phone].  Associated Diagnoses: Primary hypertension      gabapentin (NEURONTIN) 300 MG capsule Take 2 capsules (600 mg) by mouth 3 times daily  Qty: 180 capsule, Refills: 0    Comments: Future refills by PCP [facility] with phone number [phone].  Associated Diagnoses: Peripheral polyneuropathy      insulin aspart (NOVOLOG FLEXPEN) 100 UNIT/ML pen Inject 12-15 Units Subcutaneous 3 times daily (with meals)  Qty: 15 mL, Refills: 0    Comments: Future refills by PCP [facility] with phone number [phone].  Associated Diagnoses: Type 2 diabetes mellitus without complication, with long-term current use of insulin (H)      insulin glargine (LANTUS PEN) 100 UNIT/ML pen Inject 16 Units Subcutaneous every morning  Qty: 15 mL, Refills: 0    Comments: If Lantus is not covered by insurance, may substitute Basaglar or Semglee or other insulin glargine product per insurance preference at same dose and frequency.    Associated Diagnoses: Type 2 diabetes mellitus without complication, with long-term current use of insulin (H)      losartan (COZAAR) 25 MG tablet Take 1 tablet (25 mg) by mouth daily  Qty: 30 tablet, Refills: 0    Comments:  Future refills by PCP [facility] with phone number [phone].  Associated Diagnoses: Primary hypertension      pioglitazone (ACTOS) 30 MG tablet Take 1 tablet (30 mg) by mouth daily  Qty: 30 tablet, Refills: 0    Comments: Future refills by PCP [facility] with phone number [phone].  Associated Diagnoses: Type 2 diabetes mellitus without complication, with long-term current use of insulin (H)           Allergies   No Known Allergies

## 2024-03-26 NOTE — PLAN OF CARE
Patient is for discharge to home. Neuros intact. Patient will go home on a cardiac event monitor, will review discharge instructions with patient and will send hard scripts with patient to be filled at his pharmacy. Patient was able to start the application fort insurance and financial counselor emailed copy which the patient has now.

## 2024-03-27 ENCOUNTER — PATIENT OUTREACH (OUTPATIENT)
Dept: CARE COORDINATION | Facility: CLINIC | Age: 39
End: 2024-03-27
Payer: MEDICAID

## 2024-03-27 NOTE — PROGRESS NOTES
"  [facility]: [facility]: Post-Discharge Note  SITUATION                                                      Admission:    Admission Date: 03/24/24   Reason for Admission: 1. Acute CVA (cerebrovascular accident) (H)  I63.9       2. Right sided weakness  R53.1  Discharge:   Discharge Date: 03/26/24  Discharge Diagnosis: Right sided weakness    Acute CVA (cerebrovascular accident) (H)    BACKGROUND                                                      Per hospital discharge summary and inpatient provider notes:    Deni Coates is a 39 year old male with a history of ischemic stroke, diabetes and hypertension who present to the ED via EMS for an evaluation for an evaluation of stroke symptoms. The patient reports that reports that around 1630, he started to have pain at the base of his neck. Notes that he started to notice his right sided weakness when he laying down.  His face was drooping and he could hardly lift his arm and leg up.  Endorses having chest pain upon his arrival to the ED. Denies any drug use and alcohol use.  He was recently seen on 3/11/2024 with similar symptoms although it was mostly numbness and only little bit of weakness.  He had had a stroke back in 2020 look like a small thalamic stroke and there was some right-sided weakness but that apparently had resolved.  This is new tonight.  He has had chest pain off and on and that was a presentation of his last time he was admitted as well.  He is very anxious upon arrival.  Blood sugar was in the 300s and he has diabetes.      ASSESSMENT           Discharge Assessment  How are you doing now that you are home?: Patient states he felt a racing heart and light headed earlier.  This is now \"alright\" after taking his medications.  States he still feels like he will tip forward if he stands up.  States he has some bruising that just appeared on the lower back on the left side.  States bruise is about 4fly4is in size.  Right " below this bruise, about 3 inches below, there is light bruising the size of a golf ball.  Denies any recent injuries that he is aware of.  When he woke up the left side of his back was achy.  Currently feels short of breath.  Feels fatigue and a headache on right side of head.  Denies any numbness or tingling. Blood sugar is 181 today. States he hasn't had anything to eat yet today. Patient believes a lot of it is due to stress and states he feels like this is killing him.  Denies any thoughts of harming self or others. States he feels like people are wanting to harm him by trying to get him thrown in [facility], states not trying to physically harming him.  Police report has been filed he is just waiting to speak to the police. Also see note.  How are your symptoms? (Red Flag symptoms escalate to triage hotline per guidelines): Worsening  Do you feel your condition is stable enough to be safe at home until your provider visit?: Yes  Does the patient have their discharge instructions? : Yes  Does the patient have questions regarding their discharge instructions? : No  Were you started on any new medications or were there changes to any of your previous medications? : Yes  Does the patient have all of their medications?: No (see comment) (Has not been able to  new prescriptions yet.  Patient will try to get them today.)  Do you have questions regarding any of your medications? : No  Do you have all of your needed medical supplies or equipment (DME)?  (i.e. oxygen tank, CPAP, cane, etc.): Yes  Discharge follow-up appointment scheduled within 14 calendar days? : Yes  Discharge Follow Up Appointment Date: 03/28/24  Discharge Follow Up Appointment Scheduled with?: Primary Care Provider         Post-op (Clinicians Only)  Did the patient have surgery or a procedure: No    Patient Declined  Care Coordination.     RN offered triage, patient states he will just monitor his symptoms of bruising, dizziness, shortness of  breath. Has 24/7 nurse line. Patient will try eating but if symptoms do not improve or worsen patient was advised to be seen or contact nurse line as patient declined triage and declined RN sending note to PCP for further recommendations during call.     PLAN                                                      Outpatient Plan:  Follow up with primary care provider as scheduled  Follow up with Neurology in 6-8 weeks  Follow up with Psychiatry/Psychology.       Future Appointments   Date Time Provider Department Center   3/29/2024  2:30 PM Miles Case MD [facility]   5/9/2024  2:15 PM Andrew Trinh MD [facility]         For any urgent concerns, please contact our 24 hour nurse triage line: [phone] ([phone])         Sue Orozco RN

## 2024-03-29 ENCOUNTER — OFFICE VISIT (OUTPATIENT)
Dept: INTERNAL MEDICINE | Facility: CLINIC | Age: 39
End: 2024-03-29
Payer: MEDICAID

## 2024-03-29 VITALS
DIASTOLIC BLOOD PRESSURE: 80 MMHG | TEMPERATURE: 96.9 F | SYSTOLIC BLOOD PRESSURE: 130 MMHG | BODY MASS INDEX: 32.05 KG/M2 | WEIGHT: 256.4 LBS | RESPIRATION RATE: 16 BRPM | OXYGEN SATURATION: 98 % | HEART RATE: 69 BPM

## 2024-03-29 DIAGNOSIS — F32.1 CURRENT MODERATE EPISODE OF MAJOR DEPRESSIVE DISORDER, UNSPECIFIED WHETHER RECURRENT (H): ICD-10-CM

## 2024-03-29 DIAGNOSIS — E11.65 TYPE 2 DIABETES MELLITUS WITH HYPERGLYCEMIA, WITH LONG-TERM CURRENT USE OF INSULIN (H): Primary | Chronic | ICD-10-CM

## 2024-03-29 DIAGNOSIS — E78.5 HYPERLIPIDEMIA, UNSPECIFIED HYPERLIPIDEMIA TYPE: ICD-10-CM

## 2024-03-29 DIAGNOSIS — I10 PRIMARY HYPERTENSION: ICD-10-CM

## 2024-03-29 DIAGNOSIS — Z86.73 HISTORY OF ISCHEMIC STROKE: ICD-10-CM

## 2024-03-29 DIAGNOSIS — Z79.4 TYPE 2 DIABETES MELLITUS WITH HYPERGLYCEMIA, WITH LONG-TERM CURRENT USE OF INSULIN (H): Primary | Chronic | ICD-10-CM

## 2024-03-29 DIAGNOSIS — Z11.4 SCREENING FOR HIV (HUMAN IMMUNODEFICIENCY VIRUS): ICD-10-CM

## 2024-03-29 DIAGNOSIS — Z11.59 NEED FOR HEPATITIS C SCREENING TEST: ICD-10-CM

## 2024-03-29 PROBLEM — I63.9 ACUTE CVA (CEREBROVASCULAR ACCIDENT) (H): Status: RESOLVED | Noted: 2024-03-24 | Resolved: 2024-03-29

## 2024-03-29 PROBLEM — M86.9 OSTEOMYELITIS OF TOE OF LEFT FOOT (H): Status: RESOLVED | Noted: 2022-12-15 | Resolved: 2024-03-29

## 2024-03-29 PROBLEM — R53.1 RIGHT SIDED WEAKNESS: Status: RESOLVED | Noted: 2024-03-24 | Resolved: 2024-03-29

## 2024-03-29 PROCEDURE — 99495 TRANSJ CARE MGMT MOD F2F 14D: CPT | Performed by: INTERNAL MEDICINE

## 2024-03-29 RX ORDER — BLOOD-GLUCOSE SENSOR
1 EACH MISCELLANEOUS
Qty: 6 EACH | Refills: 11 | Status: SHIPPED | OUTPATIENT
Start: 2024-03-29

## 2024-03-29 RX ORDER — LOSARTAN POTASSIUM 50 MG/1
50 TABLET ORAL DAILY
Qty: 90 TABLET | Refills: 3 | Status: SHIPPED | OUTPATIENT
Start: 2024-03-29

## 2024-03-29 RX ORDER — CARVEDILOL 6.25 MG/1
6.25 TABLET ORAL 2 TIMES DAILY WITH MEALS
Qty: 180 TABLET | Refills: 3 | Status: SHIPPED | OUTPATIENT
Start: 2024-03-29

## 2024-03-29 RX ORDER — INSULIN ASPART 100 [IU]/ML
12-15 INJECTION, SOLUTION INTRAVENOUS; SUBCUTANEOUS
Qty: 45 ML | Refills: 2 | Status: SHIPPED | OUTPATIENT
Start: 2024-03-29 | End: 2024-05-02

## 2024-03-29 RX ORDER — PIOGLITAZONEHYDROCHLORIDE 30 MG/1
30 TABLET ORAL DAILY
Qty: 90 TABLET | Refills: 3 | Status: SHIPPED | OUTPATIENT
Start: 2024-03-29 | End: 2024-04-18

## 2024-03-29 RX ORDER — ATORVASTATIN CALCIUM 40 MG/1
40 TABLET, FILM COATED ORAL DAILY
Qty: 90 TABLET | Refills: 3 | Status: SHIPPED | OUTPATIENT
Start: 2024-03-29

## 2024-03-29 RX ORDER — ASPIRIN 325 MG
325 TABLET, DELAYED RELEASE (ENTERIC COATED) ORAL DAILY
Qty: 90 TABLET | Refills: 3 | Status: SHIPPED | OUTPATIENT
Start: 2024-03-29

## 2024-03-29 ASSESSMENT — PATIENT HEALTH QUESTIONNAIRE - PHQ9
SUM OF ALL RESPONSES TO PHQ QUESTIONS 1-9: 17
SUM OF ALL RESPONSES TO PHQ QUESTIONS 1-9: 17
10. IF YOU CHECKED OFF ANY PROBLEMS, HOW DIFFICULT HAVE THESE PROBLEMS MADE IT FOR YOU TO DO YOUR WORK, TAKE CARE OF THINGS AT HOME, OR GET ALONG WITH OTHER PEOPLE: SOMEWHAT DIFFICULT

## 2024-03-29 NOTE — PROGRESS NOTES
"  Assessment & Plan     Type 2 diabetes mellitus with hyperglycemia, with long-term current use of insulin (H)  Historically poor control  Get CGM  Look at options for optimizing his insulin regimen. Whether   - Albumin Random Urine Quantitative with Creat Ratio; Future  - Continuous Blood Gluc Sensor (FREESTYLE ESTEFANY 3 SENSOR) MISC; 1 each every 14 days Use 1 sensor every 14 days. Use to read blood sugars per 's instructions.  - Adult Diabetes Education  Referral; Future    Hyperlipidemia, unspecified   Cont statin   - atorvastatin (LIPITOR) 40 MG tablet; Take 1 tablet (40 mg) by mouth daily    Primary hypertension  Increase losartan to 50 mg   - carvedilol (COREG) 6.25 MG tablet; Take 1 tablet (6.25 mg) by mouth 2 times daily (with meals)  - losartan (COZAAR) 50 MG tablet; Take 1 tablet (50 mg) by mouth daily    History of ischemic stroke  Cont secondary prev. Imaging shows no evidence of any prior CVA though hx is of acute CVA 2020 arrested w/[facility]Pa  Cont asa, statin , BP mgmt and better diabetic control.     Current moderate episode of major depressive disorder, unspecified whether recurrent (H)  Start anti-depressant  See psychiatry   - FLUoxetine (PROZAC) 20 MG capsule; Take 1 capsule (20 mg) by mouth daily    Review of prior external note(s) from - [facility] information from [place] reviewed  Review of the result(s) of each unique test - labs, imaging  Diagnosis or treatment significantly limited by social determinants of health - housing,  financial  Ordering of each unique test  Prescription drug management      MED REC REQUIRED  Post Medication Reconciliation Status:  Discharge medications reconciled and changed, see notes/orders  BMI  Estimated body mass index is 32.05 kg/m  as calculated from the following:    Height as of 3/24/24: 1.905 m (6' 3\").    Weight as of this encounter: 116.3 kg (256 lb 6.4 oz).   Weight management plan: Discussed healthy diet and exercise " "guidelines    Depression Screening Follow Up        3/29/2024     2:17 PM   PHQ   PHQ-9 Total Score 17   Q9: Thoughts of better off dead/self-harm past 2 weeks Not at all           Follow Up Actions Taken  Crisis resource information provided in After Visit Summary  Mental Health Referral placed  Started patient on anti-depressant.       See Patient Instructions    Subjective   Deni is a 39 year old, presenting for the following health issues:  Hospital F/U    History of Present Illness       Diabetes:   He presents for follow up of diabetes.  He is checking home blood glucose three times daily.   He checks blood glucose before meals.  Blood glucose is sometimes over 200 and sometimes under 70. He is aware of hypoglycemia symptoms including shakiness, dizziness, weakness, blurred vision and confusion.   He is concerned about blood sugar frequently over 200.   He is having numbness in feet, excessive thirst and blurry vision.  The patient has had a diabetic eye exam in the last 12 months. Eye exam performed on november 2023. Location of last eye exam january 2024.        Hyperlipidemia:  He presents for follow up of hyperlipidemia.   He is taking medication to lower cholesterol. He is not having myalgia or other side effects to statin medications.    Hypertension: He presents for follow up of hypertension.  He does not check blood pressure  regularly outside of the clinic. Outpatient blood pressures have not been over 140/90. He follows a low salt diet.     Headaches:   Since the patient's last clinic visit, headaches are: worsened  The patient is getting headaches:  3 times a week or more  He is not able to do normal daily activities when he has a migraine.  The patient is taking the following rescue/relief medications:  Ibuprofen (Advil, Motrin)   Patient states \"I get some relief\" from the rescue/relief medications.   The patient is taking the following medications to prevent migraines:  Neurontin  In the past " "4 weeks, the patient has gone to an Urgent Care or Emergency Room 2 times times due to headaches.    He eats 0-1 servings of fruits and vegetables daily.He consumes 0 sweetened beverage(s) daily.He exercises with enough effort to increase his heart rate 9 or less minutes per day.  He exercises with enough effort to increase his heart rate 3 or less days per week. He is missing 1 dose(s) of medications per week.           3/27/2024    11:31 AM   Post Discharge Outreach   Admission Date 3/24/2024   Reason for Admission 1. Acute CVA (cerebrovascular accident) (H)  I63.9       2. Right sided weakness  R53.1   Discharge Date 3/26/2024   Discharge Diagnosis Right sided weakness    Acute CVA (cerebrovascular accident) (H)   How are you doing now that you are home? Patient states he felt a racing heart and light headed earlier.  This is now \"alright\" after taking his medications.  States he still feels like he will tip forward if he stands up.  States he has some bruising that just appeared on the lower back on the left side.  States bruise is about 9bts6ct in size.  Right below this bruise, about 3 inches below, there is light bruising the size of a golf ball.  Denies any recent injuries that he is aware of.  When he woke up the left side of his back was achy.  Currently feels short of breath.  Feels fatigue and a headache on right side of head.  Denies any numbness or tingling. Blood sugar is 181 today. States he hasn't had anything to eat yet today. Patient believes a lot of it is due to stress and states he feels like this is killing him.  Denies any thoughts of harming self or others. States he feels like people are wanting to harm him by trying to get him thrown in retirement, states not trying to physically harming him.  Police report has been filed he is just waiting to speak to the police. Also see note.   How are your symptoms? (Red Flag symptoms escalate to triage hotline per guidelines) Worsening   Do you feel your " condition is stable enough to be safe at home until your provider visit? Yes   Does the patient have their discharge instructions?  Yes   Does the patient have questions regarding their discharge instructions?  No   Were you started on any new medications or were there changes to any of your previous medications?  Yes   Does the patient have all of their medications? No (see comment)   Do you have questions regarding any of your medications?  No   Do you have all of your needed medical supplies or equipment (DME)?  (i.e. oxygen tank, CPAP, cane, etc.) Yes   Discharge follow-up appointment scheduled within 14 calendar days?  Yes   Discharge Follow Up Appointment Date 3/28/2024   Discharge Follow Up Appointment Scheduled with? Primary Care Provider     Hospital Follow-up Visit:    Hospital/Nursing Home/IP Rehab Facility: [facility]  Date of Admission: 03/24/2024  Date of Discharge: 03/26/2024  Reason(s) for Admission: Right sided weakness    Was your hospitalization related to COVID-19? No   Problems taking medications regularly:  None  Medication changes since discharge: Start taking test strip, lancets, meter device kit, and prochlorperazine  Problems adhering to non-medication therapy:  None    Summary of hospitalization:  [facility] discharge summary reviewed  Diagnostic Tests/Treatments reviewed.  Follow up needed: none  Other Healthcare Providers Involved in Patient s Care:         Specialist appointment - neurology, psychiatry   Update since discharge: stable.         Plan of care communicated with patient                     Objective    /80 (BP Location: Right arm, Patient Position: Sitting, Cuff Size: Adult Large)   Pulse 69   Temp 96.9  F (36.1  C) (Temporal)   Resp 16   Wt 116.3 kg (256 lb 6.4 oz)   SpO2 98%   BMI 32.05 kg/m    Body mass index is 32.05 kg/m .  Physical Exam   GENERAL: alert, obese, and fatigued  EYES: Eyes grossly normal to inspection,  PERRL and conjunctivae and sclerae normal  HENT: normal cephalic/atraumatic  NECK: no adenopathy, no asymmetry, masses, or scars  RESP: lungs clear to auscultation - no rales, rhonchi or wheezes  CV: regular rate and rhythm, normal S1 S2, no S3 or S4, no murmur, click or rub, no peripheral edema  ABDOMEN: soft, nontender, no hepatosplenomegaly, no masses and bowel sounds normal  MS: no gross musculoskeletal defects noted, no edema  SKIN: no suspicious lesions or rashes  NEURO: Normal strength and tone, mentation intact and speech normal  PSYCH: mentation appears normal and affect flat  Diabetic foot exam: normal DP and PT pulses, reduced sensation at multiple sites bilat toes, and trophic changes  L first toe            Signed Electronically by: Miles Case MD

## 2024-05-02 DIAGNOSIS — Z79.4 TYPE 2 DIABETES MELLITUS WITH HYPERGLYCEMIA, WITH LONG-TERM CURRENT USE OF INSULIN (H): Primary | ICD-10-CM

## 2024-05-02 DIAGNOSIS — E11.65 TYPE 2 DIABETES MELLITUS WITH HYPERGLYCEMIA, WITH LONG-TERM CURRENT USE OF INSULIN (H): Primary | ICD-10-CM

## 2024-05-03 RX ORDER — INSULIN ASPART 100 [IU]/ML
12-15 INJECTION, SOLUTION INTRAVENOUS; SUBCUTANEOUS
Qty: 45 ML | Refills: 0 | Status: SHIPPED | OUTPATIENT
Start: 2024-05-03

## 2024-05-03 NOTE — TELEPHONE ENCOUNTER
If pt intends to f/u here in the future he needs to get in to see CDE- given him # on referral he recv'd to schedule and see me in the next 90 d.  No refills beyond this Rx.

## 2024-05-07 DIAGNOSIS — Z79.4 TYPE 2 DIABETES MELLITUS WITH HYPERGLYCEMIA, WITH LONG-TERM CURRENT USE OF INSULIN (H): Primary | ICD-10-CM

## 2024-05-07 DIAGNOSIS — E11.65 TYPE 2 DIABETES MELLITUS WITH HYPERGLYCEMIA, WITH LONG-TERM CURRENT USE OF INSULIN (H): Primary | ICD-10-CM

## 2024-05-10 DIAGNOSIS — G62.9 PERIPHERAL POLYNEUROPATHY: ICD-10-CM

## 2024-05-16 RX ORDER — GABAPENTIN 300 MG/1
600 CAPSULE ORAL 3 TIMES DAILY
Qty: 180 CAPSULE | Refills: 0 | OUTPATIENT
Start: 2024-05-16

## 2024-05-26 ENCOUNTER — HEALTH MAINTENANCE LETTER (OUTPATIENT)
Age: 39
End: 2024-05-26

## 2024-06-18 ENCOUNTER — PATIENT OUTREACH (OUTPATIENT)
Dept: CARE COORDINATION | Facility: CLINIC | Age: 39
End: 2024-06-18
Payer: MEDICAID

## 2024-06-18 NOTE — PROGRESS NOTES
Stroke RN Care Coordination - Unable to Reach / Voicemail Note     Stroke RN Care Coordinator Outreach:  Stroke (90 Day mRS)     Outreach attempted x 1.      Left message on patient's voicemail with call back information and requested return call.    Stroke RN Care Coordinator will try to reach patient again in 1-2 business days.    Azalea ZUNIGA, RN, SCRN  RN Stroke Neurology Care Coordinator  [facility] Neuroscience Service Line

## 2024-06-19 NOTE — PROGRESS NOTES
Stroke RN Care Coordination - Unable to Reach / Voicemail Note     Stroke RN Care Coordinator Outreach:  Stroke (90 Day mRS)     Outreach attempted x 2 and 3.      Unable to leave  for pt after two attempts. Both times it transferred over to a prompt requesting I enter in my voicemail number.     Stroke RN Care Coordinator will do no further outreaches at this time.    Azalea Olivarez BS, RN, SCRN  RN Stroke Neurology Care Coordinator  [facility] Neuroscience Service Line

## 2024-06-19 NOTE — PROGRESS NOTES
Stroke RN Care Coordination - 90 Day Modified Buchanan Note     SITUATION     Deni Coates is a 39 year old male who is receiving support for:  Stroke (90 Day mRS)    BACKGROUND     39yoM w HTH, HLD, DM2, obesity presented to ER on 3/24 with bilateral R>L headache (worse upon waking from a nap) and right sided weakness upon waking from a nap. Seen on telestroke. NCCT/CTA unrevealing, NIHSS 8 (some objective findings were noted to fluctuate per Dr Burns's note); IV TNK risks/benefits discussed and it was administered around 6pm. MRI showed no evidence of stroke. Of note, pt had similar presentation on 3/11/(2 weeks prior), also with negative stroke workup including MRI at that time. He reports having very stressful day just prior to presentation yesterday (3/24) with niece threatening suicide with guns in his home (this writer confirmed that the guns are locked away and not accessible to her, no one is in imminent danger at this time; police were also apparently called to the scene). Pt reports recent heavy drinking, smoking cigarettes, caffeine use in January/February which he abruptly stopped in early March along with stressful breakup from ex-wife. Denies any alcohol withdrawal symptoms (last drink was >2 weeks ago). He's had recent headaches accompanied by nausea but no photophobia, phonophobia, or auras. No family history of migraines.     ASSESSMENT     Received callback from pt who answered questions below.    Current Modified Buchanan Scale (90 Day)  Modified Buchanan Score - 90 Day: 0 (6/19/2024  3:37 PM)     06/19/24 1537   Simplified Modified Buchanan Scale Questionnaire (smRSq)   Could you live alone without help from another person?  This means being able to bathe, use the toilet, shop, prepare or get meals and manage finances. Yes   Can you do everything you were doing right before your stroke, even if slower and not as much? Yes   Are you completely back to the way you were right before your stroke?  Yes, score is 0   Modified Appling Score - 90 Day 0       PLAN     Follow-up plan:  No further planned outreach at this time.    Azalea Olivarez BS, RN, SCRN  RN Stroke Neurology Care Coordinator  [facility] Neuroscience Service Line

## 2024-06-27 ENCOUNTER — HOSPITAL ENCOUNTER (EMERGENCY)
Facility: CLINIC | Age: 39
Discharge: HOME | End: 2024-06-27
Attending: EMERGENCY MEDICINE | Admitting: EMERGENCY MEDICINE
Payer: MEDICAID

## 2024-06-27 ENCOUNTER — APPOINTMENT (OUTPATIENT)
Dept: GENERAL RADIOLOGY | Facility: CLINIC | Age: 39
End: 2024-06-27
Attending: EMERGENCY MEDICINE
Payer: MEDICAID

## 2024-06-27 VITALS
HEIGHT: 76 IN | DIASTOLIC BLOOD PRESSURE: 98 MMHG | HEART RATE: 82 BPM | WEIGHT: 243.61 LBS | BODY MASS INDEX: 29.67 KG/M2 | SYSTOLIC BLOOD PRESSURE: 150 MMHG | OXYGEN SATURATION: 96 % | TEMPERATURE: 98.1 F | RESPIRATION RATE: 16 BRPM

## 2024-06-27 DIAGNOSIS — R00.2 PALPITATIONS: ICD-10-CM

## 2024-06-27 DIAGNOSIS — Z79.4 TYPE 2 DIABETES MELLITUS WITH HYPERGLYCEMIA, WITH LONG-TERM CURRENT USE OF INSULIN (H): ICD-10-CM

## 2024-06-27 DIAGNOSIS — E11.65 TYPE 2 DIABETES MELLITUS WITH HYPERGLYCEMIA, WITH LONG-TERM CURRENT USE OF INSULIN (H): ICD-10-CM

## 2024-06-27 LAB
ALBUMIN UR-MCNC: NEGATIVE MG/DL
ANION GAP SERPL CALCULATED.3IONS-SCNC: 12 MMOL/L (ref 7–15)
APPEARANCE UR: CLEAR
BILIRUB UR QL STRIP: NEGATIVE
BUN SERPL-MCNC: 12.4 MG/DL (ref 6–20)
CALCIUM SERPL-MCNC: 9.2 MG/DL (ref 8.6–10)
CHLORIDE SERPL-SCNC: 101 MMOL/L (ref 98–107)
COLOR UR AUTO: ABNORMAL
CREAT SERPL-MCNC: 0.76 MG/DL (ref 0.67–1.17)
D DIMER PPP FEU-MCNC: <0.27 UG/ML FEU (ref 0–0.5)
DEPRECATED HCO3 PLAS-SCNC: 23 MMOL/L (ref 22–29)
EGFRCR SERPLBLD CKD-EPI 2021: >90 ML/MIN/1.73M2
ERYTHROCYTE [DISTWIDTH] IN BLOOD BY AUTOMATED COUNT: 12.2 % (ref 10–15)
FLUAV RNA SPEC QL NAA+PROBE: NEGATIVE
FLUBV RNA RESP QL NAA+PROBE: NEGATIVE
GLUCOSE SERPL-MCNC: 281 MG/DL (ref 70–99)
GLUCOSE UR STRIP-MCNC: >2000 MG/DL
GROUP A STREP BY PCR: NOT DETECTED
HCT VFR BLD AUTO: 50.5 % (ref 40–53)
HGB BLD-MCNC: 16.9 G/DL (ref 13.3–17.7)
HGB UR QL STRIP: NEGATIVE
HOLD SPECIMEN: NORMAL
KETONES UR STRIP-MCNC: 40 MG/DL
LEUKOCYTE ESTERASE UR QL STRIP: NEGATIVE
MAGNESIUM SERPL-MCNC: 1.8 MG/DL (ref 1.7–2.3)
MCH RBC QN AUTO: 29.4 PG (ref 26.5–33)
MCHC RBC AUTO-ENTMCNC: 33.5 G/DL (ref 31.5–36.5)
MCV RBC AUTO: 88 FL (ref 78–100)
MUCOUS THREADS #/AREA URNS LPF: PRESENT /LPF
NITRATE UR QL: NEGATIVE
PH UR STRIP: 5.5 [PH] (ref 5–7)
PLATELET # BLD AUTO: 248 10E3/UL (ref 150–450)
POTASSIUM SERPL-SCNC: 3.8 MMOL/L (ref 3.4–5.3)
RBC # BLD AUTO: 5.74 10E6/UL (ref 4.4–5.9)
RBC URINE: <1 /HPF
RSV RNA SPEC NAA+PROBE: NEGATIVE
SARS-COV-2 RNA RESP QL NAA+PROBE: NEGATIVE
SODIUM SERPL-SCNC: 136 MMOL/L (ref 135–145)
SP GR UR STRIP: >1.035 (ref 1–1.03)
SQUAMOUS EPITHELIAL: <1 /HPF
TROPONIN T SERPL HS-MCNC: 17 NG/L
TROPONIN T SERPL HS-MCNC: 18 NG/L
TSH SERPL DL<=0.005 MIU/L-ACNC: 1.73 UIU/ML (ref 0.3–4.2)
UROBILINOGEN UR STRIP-MCNC: NORMAL MG/DL
WBC # BLD AUTO: 6.4 10E3/UL (ref 4–11)
WBC URINE: 1 /HPF

## 2024-06-27 PROCEDURE — 83036 HEMOGLOBIN GLYCOSYLATED A1C: CPT

## 2024-06-27 PROCEDURE — 85027 COMPLETE CBC AUTOMATED: CPT | Performed by: EMERGENCY MEDICINE

## 2024-06-27 PROCEDURE — 36415 COLL VENOUS BLD VENIPUNCTURE: CPT | Performed by: EMERGENCY MEDICINE

## 2024-06-27 PROCEDURE — 99285 EMERGENCY DEPT VISIT HI MDM: CPT | Mod: 25

## 2024-06-27 PROCEDURE — 83735 ASSAY OF MAGNESIUM: CPT | Performed by: EMERGENCY MEDICINE

## 2024-06-27 PROCEDURE — 80048 BASIC METABOLIC PNL TOTAL CA: CPT | Performed by: EMERGENCY MEDICINE

## 2024-06-27 PROCEDURE — 250N000013 HC RX MED GY IP 250 OP 250 PS 637: Performed by: EMERGENCY MEDICINE

## 2024-06-27 PROCEDURE — 87637 SARSCOV2&INF A&B&RSV AMP PRB: CPT | Performed by: EMERGENCY MEDICINE

## 2024-06-27 PROCEDURE — 85379 FIBRIN DEGRADATION QUANT: CPT | Performed by: EMERGENCY MEDICINE

## 2024-06-27 PROCEDURE — 84484 ASSAY OF TROPONIN QUANT: CPT | Performed by: EMERGENCY MEDICINE

## 2024-06-27 PROCEDURE — 87651 STREP A DNA AMP PROBE: CPT | Performed by: EMERGENCY MEDICINE

## 2024-06-27 PROCEDURE — 71046 X-RAY EXAM CHEST 2 VIEWS: CPT

## 2024-06-27 PROCEDURE — 93005 ELECTROCARDIOGRAM TRACING: CPT

## 2024-06-27 PROCEDURE — 93005 ELECTROCARDIOGRAM TRACING: CPT | Mod: RTG

## 2024-06-27 PROCEDURE — 84443 ASSAY THYROID STIM HORMONE: CPT | Performed by: EMERGENCY MEDICINE

## 2024-06-27 PROCEDURE — 81001 URINALYSIS AUTO W/SCOPE: CPT | Performed by: EMERGENCY MEDICINE

## 2024-06-27 RX ORDER — MAGNESIUM HYDROXIDE/ALUMINUM HYDROXICE/SIMETHICONE 120; 1200; 1200 MG/30ML; MG/30ML; MG/30ML
30 SUSPENSION ORAL ONCE
Status: COMPLETED | OUTPATIENT
Start: 2024-06-27 | End: 2024-06-27

## 2024-06-27 RX ADMIN — ALUMINUM HYDROXIDE, MAGNESIUM HYDROXIDE, AND SIMETHICONE 30 ML: 1200; 120; 1200 SUSPENSION ORAL at 17:32

## 2024-06-27 ASSESSMENT — ACTIVITIES OF DAILY LIVING (ADL)
ADLS_ACUITY_SCORE: 35
ADLS_ACUITY_SCORE: 35
ADLS_ACUITY_SCORE: 33
ADLS_ACUITY_SCORE: 35
ADLS_ACUITY_SCORE: 35

## 2024-06-27 ASSESSMENT — COLUMBIA-SUICIDE SEVERITY RATING SCALE - C-SSRS
6. HAVE YOU EVER DONE ANYTHING, STARTED TO DO ANYTHING, OR PREPARED TO DO ANYTHING TO END YOUR LIFE?: NO
2. HAVE YOU ACTUALLY HAD ANY THOUGHTS OF KILLING YOURSELF IN THE PAST MONTH?: NO
1. IN THE PAST MONTH, HAVE YOU WISHED YOU WERE DEAD OR WISHED YOU COULD GO TO SLEEP AND NOT WAKE UP?: NO

## 2024-06-27 NOTE — ED PROVIDER NOTES
"Emergency Department Note      History of Present Illness     Chief Complaint:  Palpitations       HPI   Deni Coates is a 39 year old male who presents to the ED due to a strange sensation in his chest.  The patient states that since last Thursday he has perceived some palpitations as well as exertional shortness of breath and general fatigue that makes him feel like he needs to sit down.  He felt lightheaded sometimes upon standing as well.  There has been no syncope.  The patient states that he perceived that he could hear his heartbeat in his left ear and also felt something sharp in the center/left of his chest.  He denies fever.  He also notes a little bit of left-sided throat discomfort for about the same number of days..    The patient states that he had what sound like a very large workup in March of this year.  He states he wore a heart monitor, did a CT scan, and he thinks an MRI as well.  He has a follow-up with cardiology scheduled for the end of July.  The patient also notes that he has a primary care clinic appointment scheduled for July 2    Independent Historian:    None    Review of External Notes  DC Summary 3/26/2024: Possible migraine with complex features versus functional neurologic disorder. Right-sided hemiparesis (resolved). Pt was given TNK. Normal MRI. Hx ischemic stroke    Past Medical History   Medical History, Surgical History, Problem List, and Medications  Reviewed in Epic    Physical Exam   Patient Vitals for the past 24 hrs:   BP Temp Temp src Pulse Resp SpO2 Height Weight   06/27/24 2218 (!) 150/98 -- -- -- -- 96 % -- --   06/27/24 2159 (!) 155/98 -- -- 82 -- 96 % -- --   06/27/24 2144 (!) 145/94 -- -- 76 -- 96 % -- --   06/27/24 2027 -- -- -- -- -- 96 % -- --   06/27/24 2012 (!) 133/93 -- -- -- -- -- -- --   06/27/24 1910 (!) 124/91 -- -- 80 16 99 % -- --   06/27/24 1541 (!) 140/102 98.1  F (36.7  C) Oral 79 18 99 % 1.93 m (6' 4\") 110.5 kg (243 lb 9.7 oz)       Physical " Exam  Constitutional: Vital signs reviewed as above.   Eyes: PEERL, EOMI B/L  Neck: No JVD noted. FROM   Oropharynx: Mild pharyngeal erythema.  There is bilateral tonsillar swelling (mild), but no exudate noted.  Uvula is midline.  Cardiovascular: normal rate, Regular rhythm and normal heart sounds.  No murmur heard. Equal B/L peripheral pulses.  Pulmonary/Chest: Effort normal and breath sounds normal. No respiratory distress. Patient has no wheezes. Patient has no rales.   Gastrointestinal: Soft. There is no tenderness.   Musculoskeletal/Extremities: No pitting edema noted. Normal tone.  Neurological: Alert  Skin: Skin is warm and dry. There is no diaphoresis noted.   Psychiatric: The patient appears calm.     Diagnostics     Laboratory: Imaging:   Labs Ordered and Resulted from Time of ED Arrival to Time of ED Departure   BASIC METABOLIC PANEL - Abnormal       Result Value    Sodium 136      Potassium 3.8      Chloride 101      Carbon Dioxide (CO2) 23      Anion Gap 12      Urea Nitrogen 12.4      Creatinine 0.76      GFR Estimate >90      Calcium 9.2      Glucose 281 (*)    ROUTINE UA WITH MICROSCOPIC REFLEX TO CULTURE - Abnormal    Color Urine Light Yellow      Appearance Urine Clear      Glucose Urine >2000 (*)     Bilirubin Urine Negative      Ketones Urine 40 (*)     Specific Gravity Urine >1.035 (*)     Blood Urine Negative      pH Urine 5.5      Protein Albumin Urine Negative      Urobilinogen Urine Normal      Nitrite Urine Negative      Leukocyte Esterase Urine Negative      Mucus Urine Present (*)     RBC Urine <1      WBC Urine 1      Squamous Epithelials Urine <1     CBC WITH PLATELETS - Normal    WBC Count 6.4      RBC Count 5.74      Hemoglobin 16.9      Hematocrit 50.5      MCV 88      MCH 29.4      MCHC 33.5      RDW 12.2      Platelet Count 248     INFLUENZA A/B, RSV, & SARS-COV2 PCR - Normal    Influenza A PCR Negative      Influenza B PCR Negative      RSV PCR Negative      SARS CoV2 PCR  Negative     MAGNESIUM - Normal    Magnesium 1.8     TSH WITH FREE T4 REFLEX - Normal    TSH 1.73     D DIMER QUANTITATIVE - Normal    D-Dimer Quantitative <0.27     TROPONIN T, HIGH SENSITIVITY - Normal    Troponin T, High Sensitivity 18     TROPONIN T, HIGH SENSITIVITY - Normal    Troponin T, High Sensitivity 17     GROUP A STREPTOCOCCUS PCR THROAT SWAB - Normal    Group A strep by PCR Not Detected       Chest XR,  PA & LAT   Final Result   IMPRESSION: Negative chest.      Echocardiogram Complete with Bubble    (Results Pending)         EKG     ECG results from 06/27/24   EKG 12 lead     Value    Systolic Blood Pressure     Diastolic Blood Pressure     Ventricular Rate 68    Atrial Rate 68    OK Interval 144    QRS Duration 106        QTc 418    P Axis 49    R AXIS 61    T Axis 55    Interpretation ECG      Sinus rhythm  Normal ECG  When compared with ECG of 24-MAR-2024 17:26,  No significant change was found  Interpreted by me at 1720   EKG 12 lead     Value    Systolic Blood Pressure     Diastolic Blood Pressure     Ventricular Rate 61    Atrial Rate 61    OK Interval 128    QRS Duration 116        QTc 414    P Axis 50    R AXIS 59    T Axis 58    Interpretation ECG      Sinus rhythm  Normal ECG  When compared with ECG of 27-JUN-2024 15:50, (unconfirmed)  No significant change was found  Interpreted by me at 2035         Independent Interpretation  See ED course    ED Course    Medications Administered  Medications   alum & mag hydroxide-simethicone (MAALOX) suspension 30 mL (30 mLs Oral $Given 6/27/24 1732)       Procedures  Procedures     Discussion of Management  See ED Course    Social Determinants of Health adding to complexity of care  None    ED Course  ED Course as of 06/28/24 0038   Thu Jun 27, 2024   1721 Initial evaluation.   1941 Rechecked and updated.  The patient states he felt like he nodded off to sleep and then felt palpitations and his heart and high blood pressure. Will recheck  vitals and EKG.   2027 Rechecked and updated.   2213 Rechecked and updated.       Medical Decision Making / Diagnosis   CMS Diagnoses: None    Code Status: Prior    MIPS     None    Medical Decision Making:  Deni Coates is a 39 year old male presented to the Emergency Department with a complaint of a strange sensation in his chest. Fortunately the workup in the ED has been unremarkable and at this time I am not concerned for ACS. The EKG shows sinus rhythm x2. The troponin is negative x2. Based on the [facility] high sensitivity troponin pathway, this should rule the patient out for myocardial injury.    I considered other possible causes of chest pain including PE (PERC negative), infection, pneumothorax, aortic dissection, inflammatory conditions (percarditis, etc.) and even more benign causes such as reflux and esophageal motility issues. The physical exam, laboratory, and radiological findings listed above make life threatening conditions less likely.  The patient notes that he did have a cardiac workup in November of this year that did not show anything serious.  I have placed an order for a Zio patch and outpatient echocardiogram and have attached his primary care physician to the order.  At this time I believe the patient is safe for discharge. I have encouraged close outpatient follow up.     Anticipatory guidance given prior to discharge.         Critical Care:  None.    Disposition:  See ED Course and MDM    ICD-10 Codes:    ICD-10-CM    1. Palpitations  R00.2 Zio Patch Mail Out     Primary Care Referral     Echocardiogram Complete with Bubble           Discharge Medications:  Discharge Medication List as of 6/27/2024 10:38 PM           6/27/2024   Pedro Arnold DO     [employer]                    Pedro Arnold DO  06/28/24 0038       Pedro Arnold DO  06/28/24 0038

## 2024-06-27 NOTE — Clinical Note
Deni Coates was seen and treated in our emergency department on 6/27/2024.  He may return to work on 06/28/2024.  Your plate was seen here today for medical condition.  He is able to return to work on 6/28/2024 without restriction.  If your employee is not able to perform his job functions, further work release is will need to come from his primary care clinic.     If you have any questions or concerns, please don't hesitate to call.      Pedro Arnold, DO

## 2024-06-27 NOTE — ED TRIAGE NOTES
Patient reports one week of ongoing palpitations, bilateral lower leg swelling, fatigue, lower back pain, and a general feeling of unwell.      Triage Assessment (Adult)       Row Name 06/27/24 0756          Triage Assessment    Airway WDL WDL        Respiratory WDL    Respiratory WDL WDL        Skin Circulation/Temperature WDL    Skin Circulation/Temperature WDL WDL        Cardiac WDL    Cardiac WDL WDL        Peripheral/Neurovascular WDL    Peripheral Neurovascular WDL WDL        Cognitive/Neuro/Behavioral WDL    Cognitive/Neuro/Behavioral WDL WDL

## 2024-06-28 ENCOUNTER — PATIENT OUTREACH (OUTPATIENT)
Dept: INTERNAL MEDICINE | Facility: CLINIC | Age: 39
End: 2024-06-28
Payer: MEDICAID

## 2024-06-28 ENCOUNTER — ORDERS ONLY (AUTO-RELEASED) (OUTPATIENT)
Dept: EMERGENCY MEDICINE | Facility: CLINIC | Age: 39
End: 2024-06-28
Payer: MEDICAID

## 2024-06-28 DIAGNOSIS — R00.2 PALPITATIONS: ICD-10-CM

## 2024-06-28 DIAGNOSIS — G62.9 PERIPHERAL POLYNEUROPATHY: ICD-10-CM

## 2024-06-28 LAB
ATRIAL RATE - MUSE: 61 BPM
ATRIAL RATE - MUSE: 68 BPM
DIASTOLIC BLOOD PRESSURE - MUSE: NORMAL MMHG
DIASTOLIC BLOOD PRESSURE - MUSE: NORMAL MMHG
INTERPRETATION ECG - MUSE: NORMAL
INTERPRETATION ECG - MUSE: NORMAL
P AXIS - MUSE: 49 DEGREES
P AXIS - MUSE: 50 DEGREES
PR INTERVAL - MUSE: 128 MS
PR INTERVAL - MUSE: 144 MS
QRS DURATION - MUSE: 106 MS
QRS DURATION - MUSE: 116 MS
QT - MUSE: 394 MS
QT - MUSE: 412 MS
QTC - MUSE: 414 MS
QTC - MUSE: 418 MS
R AXIS - MUSE: 59 DEGREES
R AXIS - MUSE: 61 DEGREES
SYSTOLIC BLOOD PRESSURE - MUSE: NORMAL MMHG
SYSTOLIC BLOOD PRESSURE - MUSE: NORMAL MMHG
T AXIS - MUSE: 55 DEGREES
T AXIS - MUSE: 58 DEGREES
VENTRICULAR RATE- MUSE: 61 BPM
VENTRICULAR RATE- MUSE: 68 BPM

## 2024-07-01 RX ORDER — GABAPENTIN 300 MG/1
600 CAPSULE ORAL 3 TIMES DAILY
Qty: 180 CAPSULE | Refills: 0 | Status: CANCELLED
Start: 2024-07-01

## 2024-07-01 NOTE — TELEPHONE ENCOUNTER
"Pt called the clinic back.     Pt has appt tomorrow for \"diabetes\". Ok to do hospital follow up at that time?     Pt is asking for a refill of gabapentin. Med pended for provider to review.       Transitions of Care Outreach  Chief Complaint   Patient presents with    Hospital F/U       Most Recent Admission Date: 6/27/2024   Most Recent Admission Diagnosis:      Most Recent Discharge Date: 6/27/2024   Most Recent Discharge Diagnosis: Palpitations - R00.2     Transitions of Care Assessment    Discharge Assessment  How are you doing now that you are home?: a lot better  How are your symptoms? (Red Flag symptoms escalate to triage hotline per guidelines): Improved  Do you know how to contact your clinic care team if you have future questions or changes to your health status? : Yes  Does the patient have their discharge instructions? : Yes  Does the patient have questions regarding their discharge instructions? : No  Were you started on any new medications or were there changes to any of your previous medications? : No  Does the patient have all of their medications?: No (see comment) (needs a refill)  Do you have questions regarding any of your medications? : No  Do you have all of your needed medical supplies or equipment (DME)?  (i.e. oxygen tank, CPAP, cane, etc.): Yes    Follow up Plan          Future Appointments   Date Time Provider Department Center   7/2/2024  9:00 AM Miles Case MD [facility]   11/18/2024  1:00 PM Neri Baker, PhD [facility]   12/2/2024 11:00 AM Neri Baker, PhD [facility]       Outpatient Plan as outlined on AVS reviewed with patient.    For any urgent concerns, please contact our 24 hour nurse triage line: [phone] ([phone])       Alicia Gilbert RN     "

## 2024-07-02 ENCOUNTER — OFFICE VISIT (OUTPATIENT)
Dept: INTERNAL MEDICINE | Facility: CLINIC | Age: 39
End: 2024-07-02
Attending: INTERNAL MEDICINE
Payer: MEDICAID

## 2024-07-02 ENCOUNTER — TELEPHONE (OUTPATIENT)
Dept: INTERNAL MEDICINE | Facility: CLINIC | Age: 39
End: 2024-07-02

## 2024-07-02 VITALS
WEIGHT: 249.1 LBS | OXYGEN SATURATION: 99 % | DIASTOLIC BLOOD PRESSURE: 82 MMHG | TEMPERATURE: 97 F | HEIGHT: 76 IN | HEART RATE: 75 BPM | BODY MASS INDEX: 30.33 KG/M2 | SYSTOLIC BLOOD PRESSURE: 120 MMHG | RESPIRATION RATE: 16 BRPM

## 2024-07-02 DIAGNOSIS — G62.9 PERIPHERAL POLYNEUROPATHY: ICD-10-CM

## 2024-07-02 DIAGNOSIS — Z11.59 NEED FOR HEPATITIS C SCREENING TEST: ICD-10-CM

## 2024-07-02 DIAGNOSIS — E11.65 TYPE 2 DIABETES MELLITUS WITH HYPERGLYCEMIA, WITH LONG-TERM CURRENT USE OF INSULIN (H): Primary | ICD-10-CM

## 2024-07-02 DIAGNOSIS — Z79.4 TYPE 2 DIABETES MELLITUS WITH HYPERGLYCEMIA, WITH LONG-TERM CURRENT USE OF INSULIN (H): Primary | ICD-10-CM

## 2024-07-02 DIAGNOSIS — Z11.4 SCREENING FOR HIV (HUMAN IMMUNODEFICIENCY VIRUS): ICD-10-CM

## 2024-07-02 DIAGNOSIS — F32.1 CURRENT MODERATE EPISODE OF MAJOR DEPRESSIVE DISORDER, UNSPECIFIED WHETHER RECURRENT (H): ICD-10-CM

## 2024-07-02 LAB
CREAT UR-MCNC: 154 MG/DL
HBA1C MFR BLD: 11.6 %
MICROALBUMIN UR-MCNC: 33.4 MG/L
MICROALBUMIN/CREAT UR: 21.69 MG/G CR (ref 0–17)

## 2024-07-02 PROCEDURE — G2211 COMPLEX E/M VISIT ADD ON: HCPCS | Performed by: INTERNAL MEDICINE

## 2024-07-02 PROCEDURE — 82570 ASSAY OF URINE CREATININE: CPT | Performed by: INTERNAL MEDICINE

## 2024-07-02 PROCEDURE — 82043 UR ALBUMIN QUANTITATIVE: CPT | Performed by: INTERNAL MEDICINE

## 2024-07-02 PROCEDURE — 99214 OFFICE O/P EST MOD 30 MIN: CPT | Performed by: INTERNAL MEDICINE

## 2024-07-02 RX ORDER — FLUOXETINE 40 MG/1
40 CAPSULE ORAL DAILY
Qty: 90 CAPSULE | Refills: 3 | Status: SHIPPED | OUTPATIENT
Start: 2024-07-02

## 2024-07-02 RX ORDER — GABAPENTIN 300 MG/1
300-600 CAPSULE ORAL 3 TIMES DAILY
Qty: 180 CAPSULE | Refills: 5 | Status: SHIPPED | OUTPATIENT
Start: 2024-07-02

## 2024-07-02 ASSESSMENT — PATIENT HEALTH QUESTIONNAIRE - PHQ9
SUM OF ALL RESPONSES TO PHQ QUESTIONS 1-9: 8
10. IF YOU CHECKED OFF ANY PROBLEMS, HOW DIFFICULT HAVE THESE PROBLEMS MADE IT FOR YOU TO DO YOUR WORK, TAKE CARE OF THINGS AT HOME, OR GET ALONG WITH OTHER PEOPLE: NOT DIFFICULT AT ALL
SUM OF ALL RESPONSES TO PHQ QUESTIONS 1-9: 8

## 2024-07-02 NOTE — PATIENT INSTRUCTIONS
Check fasting glucose each morning   Look at prior 4-5 days of readings  If average glucose for those dates > 140 add 2 units to your basal long acting insulin dose  Repeat this every 4-5 days slowly building your dose so that your morning # are < 140.

## 2024-07-02 NOTE — PROGRESS NOTES
Assessment & Plan     Type 2 diabetes mellitus with hyperglycemia, with long-term current use of insulin (H)  Continue to focus on improving control.  Increase his basal dose.  Titration  schedule given to him.  Follow-up with CDE  Try to get a CGM  - HEMOGLOBIN A1C; Future  - Adult Diabetes Education  Referral; Future  - insulin glargine (LANTUS PEN) 100 UNIT/ML pen; Inject 20 Units Subcutaneous every morning , increase per titration (max dose 60 units/d)  - Albumin Random Urine Quantitative with Creat Ratio    Current moderate episode of major depressive disorder, unspecified whether recurrent (H)  Better increase-fluoxetine dose to 40 mg  - FLUoxetine (PROZAC) 40 MG capsule; Take 1 capsule (40 mg) by mouth daily  - Adult Mental Health  Referral; Future    Peripheral polyneuropathy  - gabapentin (NEURONTIN) 300 MG capsule; Take 1-2 capsules (300-600 mg) by mouth 3 times daily    Screening for HIV (human immunodeficiency virus  - HIV Antigen Antibody Combo; Future    Need for hepatitis C screening test  - Hepatitis C Screen Reflex to HCV RNA Quant and Genotype; Future      The longitudinal plan of care for the diagnosis(es)/condition(s) as documented were addressed during this visit. Due to the added complexity in care, I will continue to support Deni in the subsequent management and with ongoing continuity of care.          Subjective   Deni is a 39 year old, presenting for the following health issues:  Diabetes    History of Present Illness       Diabetes:   He presents for follow up of diabetes.  He is checking home blood glucose three times daily.   He checks blood glucose after meals.  Blood glucose is sometimes over 200 and never under 70. He is aware of hypoglycemia symptoms including shakiness, dizziness, weakness, blurred vision and confusion.    He has no concerns regarding his diabetes at this time.  He is having numbness in feet, redness, sores, or blisters on feet, excessive  "thirst, blurry vision and weight gain.           Lacks insurance.  Does get meds free at the  pharmacy.  States his fasting blood sugars over 200.  We attempted to get him CGM but states this was not available.  After our first visit he also contacted us stating he  Wanted to stop all his medication and give up essentially on life.  Since then he states mentally is feeling quite a bit better.  The SSRI started in March seems to be helping.  He no longer feels this way.                      Objective    /82   Pulse 75   Temp 97  F (36.1  C) (Temporal)   Resp 16   Ht 1.93 m (6' 4\")   Wt 113 kg (249 lb 1.6 oz)   SpO2 99%   BMI 30.32 kg/m    Body mass index is 30.32 kg/m .  Physical Exam   GENERAL: alert, no distress, and obese  RESP: lungs clear to auscultation - no rales, rhonchi or wheezes  CV: regular rate and rhythm, normal S1 S2, no S3 or S4, no murmur, click or rub, no peripheral edema             Signed Electronically by: Miles Case MD    "

## 2024-07-02 NOTE — TELEPHONE ENCOUNTER
TO PCP:     Lab called ([facility])     They were able to add on albumin and A1C to the prior labs     But the Hep C and HIV tests could not be added on because the blood sample was too old - lab is going to change these from add on to new lab orders     Please advise - should pt return to lab to collect the Hep C and HIV screen labs?     Dimple ARAUJO, Triage RN  [facility]

## 2024-08-05 ENCOUNTER — TELEPHONE (OUTPATIENT)
Dept: CARDIOLOGY | Facility: CLINIC | Age: 39
End: 2024-08-05
Payer: MEDICAID

## 2024-08-05 NOTE — TELEPHONE ENCOUNTER
Cardiology received notification from [employer] that Zio monitor is overdue pending return.  Monitor was ordered by  Dr. Pedro Arnold [facility]  for a 7 day wear time. Messaged primary team to follow up with pt regarding the status of this monitor.

## 2024-08-07 ENCOUNTER — HOSPITAL ENCOUNTER (EMERGENCY)
Facility: CLINIC | Age: 39
Discharge: HOME | End: 2024-08-07
Admitting: EMERGENCY MEDICINE
Payer: MEDICAID

## 2024-08-07 VITALS
TEMPERATURE: 98.4 F | BODY MASS INDEX: 30.44 KG/M2 | RESPIRATION RATE: 18 BRPM | SYSTOLIC BLOOD PRESSURE: 164 MMHG | WEIGHT: 250 LBS | HEART RATE: 95 BPM | OXYGEN SATURATION: 97 % | DIASTOLIC BLOOD PRESSURE: 102 MMHG | HEIGHT: 76 IN

## 2024-08-07 LAB
ANION GAP SERPL CALCULATED.3IONS-SCNC: 14 MMOL/L (ref 7–15)
ATRIAL RATE - MUSE: 91 BPM
BASOPHILS # BLD AUTO: 0 10E3/UL (ref 0–0.2)
BASOPHILS NFR BLD AUTO: 1 %
BUN SERPL-MCNC: 13.5 MG/DL (ref 6–20)
CALCIUM SERPL-MCNC: 9 MG/DL (ref 8.8–10.4)
CHLORIDE SERPL-SCNC: 97 MMOL/L (ref 98–107)
CREAT SERPL-MCNC: 0.58 MG/DL (ref 0.67–1.17)
DIASTOLIC BLOOD PRESSURE - MUSE: NORMAL MMHG
EGFRCR SERPLBLD CKD-EPI 2021: >90 ML/MIN/1.73M2
EOSINOPHIL # BLD AUTO: 0.1 10E3/UL (ref 0–0.7)
EOSINOPHIL NFR BLD AUTO: 1 %
ERYTHROCYTE [DISTWIDTH] IN BLOOD BY AUTOMATED COUNT: 12.3 % (ref 10–15)
GLUCOSE SERPL-MCNC: 269 MG/DL (ref 70–99)
HCO3 SERPL-SCNC: 21 MMOL/L (ref 22–29)
HCT VFR BLD AUTO: 45.7 % (ref 40–53)
HGB BLD-MCNC: 15.5 G/DL (ref 13.3–17.7)
HOLD SPECIMEN: NORMAL
HOLD SPECIMEN: NORMAL
IMM GRANULOCYTES # BLD: 0 10E3/UL
IMM GRANULOCYTES NFR BLD: 0 %
INTERPRETATION ECG - MUSE: NORMAL
LYMPHOCYTES # BLD AUTO: 1.7 10E3/UL (ref 0.8–5.3)
LYMPHOCYTES NFR BLD AUTO: 28 %
MCH RBC QN AUTO: 29.3 PG (ref 26.5–33)
MCHC RBC AUTO-ENTMCNC: 33.9 G/DL (ref 31.5–36.5)
MCV RBC AUTO: 86 FL (ref 78–100)
MONOCYTES # BLD AUTO: 0.4 10E3/UL (ref 0–1.3)
MONOCYTES NFR BLD AUTO: 6 %
NEUTROPHILS # BLD AUTO: 3.8 10E3/UL (ref 1.6–8.3)
NEUTROPHILS NFR BLD AUTO: 64 %
NRBC # BLD AUTO: 0 10E3/UL
NRBC BLD AUTO-RTO: 0 /100
P AXIS - MUSE: 28 DEGREES
PLATELET # BLD AUTO: 265 10E3/UL (ref 150–450)
POTASSIUM SERPL-SCNC: 4.2 MMOL/L (ref 3.4–5.3)
PR INTERVAL - MUSE: 138 MS
QRS DURATION - MUSE: 96 MS
QT - MUSE: 364 MS
QTC - MUSE: 447 MS
R AXIS - MUSE: 47 DEGREES
RBC # BLD AUTO: 5.29 10E6/UL (ref 4.4–5.9)
SODIUM SERPL-SCNC: 132 MMOL/L (ref 135–145)
SYSTOLIC BLOOD PRESSURE - MUSE: NORMAL MMHG
T AXIS - MUSE: 52 DEGREES
TROPONIN T SERPL HS-MCNC: 11 NG/L
VENTRICULAR RATE- MUSE: 91 BPM
WBC # BLD AUTO: 5.9 10E3/UL (ref 4–11)

## 2024-08-07 PROCEDURE — 80048 BASIC METABOLIC PNL TOTAL CA: CPT | Performed by: EMERGENCY MEDICINE

## 2024-08-07 PROCEDURE — 99281 EMR DPT VST MAYX REQ PHY/QHP: CPT

## 2024-08-07 PROCEDURE — 93005 ELECTROCARDIOGRAM TRACING: CPT

## 2024-08-07 PROCEDURE — 84484 ASSAY OF TROPONIN QUANT: CPT | Performed by: EMERGENCY MEDICINE

## 2024-08-07 PROCEDURE — 36415 COLL VENOUS BLD VENIPUNCTURE: CPT | Performed by: EMERGENCY MEDICINE

## 2024-08-07 PROCEDURE — 85025 COMPLETE CBC W/AUTO DIFF WBC: CPT | Performed by: EMERGENCY MEDICINE

## 2024-08-07 ASSESSMENT — COLUMBIA-SUICIDE SEVERITY RATING SCALE - C-SSRS
2. HAVE YOU ACTUALLY HAD ANY THOUGHTS OF KILLING YOURSELF IN THE PAST MONTH?: NO
6. HAVE YOU EVER DONE ANYTHING, STARTED TO DO ANYTHING, OR PREPARED TO DO ANYTHING TO END YOUR LIFE?: NO
1. IN THE PAST MONTH, HAVE YOU WISHED YOU WERE DEAD OR WISHED YOU COULD GO TO SLEEP AND NOT WAKE UP?: NO

## 2024-08-07 ASSESSMENT — ACTIVITIES OF DAILY LIVING (ADL): ADLS_ACUITY_SCORE: 35

## 2024-08-07 NOTE — ED TRIAGE NOTES
Patient presents with complaints of chest pain and shortness of breath that started this morning when he woke up, now complains of pain when taking a deep breath. Patiuent has a history of DM, says he has not been taking his insulin because he has been under a lot of stress lately.        Triage Assessment (Adult)       Row Name 08/07/24 0751          Triage Assessment    Airway WDL WDL        Cognitive/Neuro/Behavioral WDL    Cognitive/Neuro/Behavioral WDL WDL

## 2024-08-07 NOTE — ED TRIAGE NOTES
Pt presents to the ED via EMS for evaluation of chest pain. Per EMS report: hx of MI and stroke, takes plavix, 0630 onset of chest pain, bilateral leg tingling, increased stressors at home, not taking insulin. SR for EMS. Pre hospital bs: 254     Triage Assessment (Adult)       Row Name 08/07/24 0751          Triage Assessment    Airway WDL WDL        Cognitive/Neuro/Behavioral WDL    Cognitive/Neuro/Behavioral WDL WDL

## 2024-10-13 ENCOUNTER — HEALTH MAINTENANCE LETTER (OUTPATIENT)
Age: 39
End: 2024-10-13

## 2024-10-28 ENCOUNTER — OFFICE VISIT (OUTPATIENT)
Dept: FAMILY MEDICINE | Facility: CLINIC | Age: 39
End: 2024-10-28
Payer: MEDICAID

## 2024-10-28 VITALS
OXYGEN SATURATION: 98 % | TEMPERATURE: 97.6 F | RESPIRATION RATE: 18 BRPM | SYSTOLIC BLOOD PRESSURE: 122 MMHG | HEART RATE: 67 BPM | DIASTOLIC BLOOD PRESSURE: 80 MMHG

## 2024-10-28 DIAGNOSIS — R42 DIZZINESS: Primary | ICD-10-CM

## 2024-10-28 DIAGNOSIS — R00.2 PALPITATIONS: ICD-10-CM

## 2024-10-28 DIAGNOSIS — R35.0 FREQUENT URINATION: ICD-10-CM

## 2024-10-28 DIAGNOSIS — R81 GLYCOSURIA: ICD-10-CM

## 2024-10-28 DIAGNOSIS — R52 GENERALIZED BODY ACHES: ICD-10-CM

## 2024-10-28 LAB
ALBUMIN UR-MCNC: NEGATIVE MG/DL
ANION GAP SERPL CALCULATED.3IONS-SCNC: 12 MMOL/L (ref 7–15)
APPEARANCE UR: CLEAR
BILIRUB UR QL STRIP: NEGATIVE
BUN SERPL-MCNC: 11 MG/DL (ref 6–20)
CALCIUM SERPL-MCNC: 8.8 MG/DL (ref 8.8–10.4)
CHLORIDE SERPL-SCNC: 101 MMOL/L (ref 98–107)
COLOR UR AUTO: YELLOW
CREAT SERPL-MCNC: 0.6 MG/DL (ref 0.67–1.17)
EGFRCR SERPLBLD CKD-EPI 2021: >90 ML/MIN/1.73M2
ERYTHROCYTE [DISTWIDTH] IN BLOOD BY AUTOMATED COUNT: 11.8 % (ref 10–15)
GLUCOSE SERPL-MCNC: 281 MG/DL (ref 70–99)
GLUCOSE UR STRIP-MCNC: 1000 MG/DL
HCO3 SERPL-SCNC: 19 MMOL/L (ref 22–29)
HCT VFR BLD AUTO: 46.5 % (ref 40–53)
HGB BLD-MCNC: 15.8 G/DL (ref 13.3–17.7)
HGB UR QL STRIP: NEGATIVE
KETONES UR STRIP-MCNC: NEGATIVE MG/DL
LEUKOCYTE ESTERASE UR QL STRIP: NEGATIVE
MCH RBC QN AUTO: 29.6 PG (ref 26.5–33)
MCHC RBC AUTO-ENTMCNC: 34 G/DL (ref 31.5–36.5)
MCV RBC AUTO: 87 FL (ref 78–100)
NITRATE UR QL: NEGATIVE
PH UR STRIP: 5.5 [PH] (ref 5–7)
PLATELET # BLD AUTO: 249 10E3/UL (ref 150–450)
POTASSIUM SERPL-SCNC: 4.5 MMOL/L (ref 3.4–5.3)
RBC # BLD AUTO: 5.34 10E6/UL (ref 4.4–5.9)
SODIUM SERPL-SCNC: 132 MMOL/L (ref 135–145)
SP GR UR STRIP: 1.01 (ref 1–1.03)
UROBILINOGEN UR STRIP-ACNC: 0.2 E.U./DL
WBC # BLD AUTO: 5.9 10E3/UL (ref 4–11)

## 2024-10-28 PROCEDURE — 36415 COLL VENOUS BLD VENIPUNCTURE: CPT

## 2024-10-28 PROCEDURE — 81003 URINALYSIS AUTO W/O SCOPE: CPT

## 2024-10-28 PROCEDURE — 85027 COMPLETE CBC AUTOMATED: CPT

## 2024-10-28 PROCEDURE — 80048 BASIC METABOLIC PNL TOTAL CA: CPT

## 2024-10-28 PROCEDURE — 99214 OFFICE O/P EST MOD 30 MIN: CPT

## 2024-10-28 PROCEDURE — 93005 ELECTROCARDIOGRAM TRACING: CPT

## 2024-10-28 RX ORDER — IBUPROFEN 800 MG/1
800 TABLET, FILM COATED ORAL
COMMUNITY

## 2024-10-28 NOTE — PATIENT INSTRUCTIONS
Urine test today does show quite a bit of spilled sugar in your urine.    Drawing a complete blood count today, electrolytes, and kidney function test.  If significantly abnormal know that you will need to go to the ER and this will be communicated to you.      Please plan on following up with your primary care provider for dizziness symptoms and better management of diabetes.

## 2024-10-28 NOTE — LETTER
October 28, 2024      Deni DK Coates  [address]  [place] [place] [address]        To Whom It May Concern:    Deni DK Coates  was seen on 10/28/24.  Please excuse his absence on 10/28 - 10/29/24 due to illness.        Sincerely,        [facility]

## 2024-10-28 NOTE — PROGRESS NOTES
URGENT CARE    ASSESSMENT AND PLAN:      ICD-10-CM    1. Dizziness  R42 EKG 12-lead, tracing only     CBC with platelets     Basic metabolic panel     CBC with platelets     Basic metabolic panel      2. Frequent urination  R35.0 UA Macroscopic with reflex to Microscopic and Culture     CANCELED: UA Macroscopic with reflex to Microscopic and Culture      3. Generalized body aches  R52       4. Palpitations  R00.2 EKG 12-lead, tracing only     CBC with platelets     Basic metabolic panel     CBC with platelets     Basic metabolic panel      5. Glycosuria  R81             DDx for Dizziness inclues the following: vertigo, orthostatic hypotension, hypoglycemia, POTS, hyponatremia, dehydration, middle ear infection, arrhythmia, TIA/stroke, structural lesions (primary or metastatic tumor, intracranial hemorrhage).        Unclear etiology of symptoms but lab work did reflect glycosuria and hyperglycemia.  CBC and BMP did not show significant abnormality.  EKG read and interpreted by  provider without abnormal heart rhythm identified.  I have advised patient to continue to monitor blood sugar and follow-up with PCP with symptoms.       Patient verbalized understanding and is agreeable to plan. The patient was discharged ambulatory and in stable condition.        Subjective     Deni Coates is a 39 year old who presents for chief complaint of dizziness and lightheaded regardless of position movements or rest x5 days.  Associated symptoms include frequent urination, generalized bodyaches, and intermittent palpitations.   Denies fever/chills, vomiting, extremity weakness, slurred speech, shortness of breath, or chest pain. Treatments tried include DayQuil/NyQuil with relief of symptoms.        He does have a history of uncontrolled type 2 diabetes on insulin with last hemoglobin A1c 11.6% (06/2024).  Small vessel right thalamic/internal capsule stroke arrested by TPA (10/2020).    Seizures: No  Prior episodes:  No    Review of Systems  All systems reviewed and negative except per HPI.        Objective    /80   Pulse 67   Temp 97.6  F (36.4  C) (Tympanic)   Resp 18   SpO2 98%     Physical Exam     GENERAL APPEARANCE: healthy, alert and no distress  EYES: EOMI,  PERRL, conjunctiva clear  HENT: ear canals and TM's normal.  Nose and mouth without ulcers, erythema or lesions  NECK: supple, nontender, no lymphadenopathy  RESP: lungs clear to auscultation - no rales, rhonchi or wheezes  CV: regular rates and rhythm, normal S1 S2, no murmur noted  ABDOMEN:  soft, nontender, no HSM or masses and bowel sounds normal  NEURO: 5/5 strength and tone in all four extremities, speech normal, Romberg negative, normal speech and mentation, and normal coordination    SKIN: no suspicious lesions or rashes      Results for orders placed or performed in visit on 10/28/24 (from the past 24 hours)   UA Macroscopic with reflex to Microscopic and Culture    Specimen: Urine, Clean Catch   Result Value Ref Range    Color Urine Yellow Colorless, Straw, Light Yellow, Yellow    Appearance Urine Clear Clear    Glucose Urine 1000 (A) Negative mg/dL    Bilirubin Urine Negative Negative    Ketones Urine Negative Negative mg/dL    Specific Gravity Urine 1.015 1.003 - 1.035    Blood Urine Negative Negative    pH Urine 5.5 5.0 - 7.0    Protein Albumin Urine Negative Negative mg/dL    Urobilinogen Urine 0.2 0.2, 1.0 E.U./dL    Nitrite Urine Negative Negative    Leukocyte Esterase Urine Negative Negative    Narrative    Microscopic not indicated   CBC with platelets   Result Value Ref Range    WBC Count 5.9 4.0 - 11.0 10e3/uL    RBC Count 5.34 4.40 - 5.90 10e6/uL    Hemoglobin 15.8 13.3 - 17.7 g/dL    Hematocrit 46.5 40.0 - 53.0 %    MCV 87 78 - 100 fL    MCH 29.6 26.5 - 33.0 pg    MCHC 34.0 31.5 - 36.5 g/dL    RDW 11.8 10.0 - 15.0 %    Platelet Count 249 150 - 450 10e3/uL       Last Comprehensive Metabolic Panel:  Lab Results   Component Value Date      (L) 10/28/2024    POTASSIUM 4.5 10/28/2024    CHLORIDE 101 10/28/2024    CO2 19 (L) 10/28/2024    ANIONGAP 12 10/28/2024     (H) 10/28/2024    BUN 11.0 10/28/2024    CR 0.60 (L) 10/28/2024    GFRESTIMATED >90 10/28/2024    XAVIER 8.8 10/28/2024

## 2024-10-30 DIAGNOSIS — E11.65 TYPE 2 DIABETES MELLITUS WITH HYPERGLYCEMIA, WITH LONG-TERM CURRENT USE OF INSULIN (H): ICD-10-CM

## 2024-10-30 DIAGNOSIS — Z79.4 TYPE 2 DIABETES MELLITUS WITH HYPERGLYCEMIA, WITH LONG-TERM CURRENT USE OF INSULIN (H): ICD-10-CM

## 2024-10-30 RX ORDER — INSULIN ASPART 100 [IU]/ML
INJECTION, SOLUTION INTRAVENOUS; SUBCUTANEOUS
Qty: 30 ML | Refills: 11 | Status: SHIPPED | OUTPATIENT
Start: 2024-10-30

## 2024-11-11 ENCOUNTER — OFFICE VISIT (OUTPATIENT)
Dept: URGENT CARE | Facility: URGENT CARE | Age: 39
End: 2024-11-11
Payer: COMMERCIAL

## 2024-11-11 VITALS
OXYGEN SATURATION: 97 % | DIASTOLIC BLOOD PRESSURE: 88 MMHG | HEART RATE: 83 BPM | RESPIRATION RATE: 16 BRPM | SYSTOLIC BLOOD PRESSURE: 130 MMHG | TEMPERATURE: 97.5 F

## 2024-11-11 DIAGNOSIS — H00.011 HORDEOLUM EXTERNUM OF RIGHT UPPER EYELID: Primary | ICD-10-CM

## 2024-11-11 PROCEDURE — 99213 OFFICE O/P EST LOW 20 MIN: CPT | Performed by: FAMILY MEDICINE

## 2024-11-11 RX ORDER — CEPHALEXIN 500 MG/1
500 CAPSULE ORAL 3 TIMES DAILY
Qty: 21 CAPSULE | Refills: 0 | Status: SHIPPED | OUTPATIENT
Start: 2024-11-11 | End: 2024-11-18

## 2024-11-11 RX ORDER — ERYTHROMYCIN 5 MG/G
0.5 OINTMENT OPHTHALMIC 3 TIMES DAILY
Qty: 3.5 G | Refills: 0 | Status: SHIPPED | OUTPATIENT
Start: 2024-11-11 | End: 2024-11-18

## 2024-11-11 NOTE — PROGRESS NOTES
EPICSPMNEYESUBJECTIVE:Chief Complaint:   Chief Complaint   Patient presents with    Eye Problem     R upper eyelid is swollen, red, and painful      History of Present Illness: Deni Coates is a 39 year old male who presents complaining of tender bump on eyelid for few days.  Onset/timing: gradual.   Associated Signs and Symptoms: discomfort   Treatment measures tried include: none   Contact wearer : No    Past Medical History:   Diagnosis Date    Anxiety     Diabetes mellitus (H)     Hypertension     Osteomyelitis of toe of left foot (H)      No Known Allergies  Social History     Tobacco Use    Smoking status: Never    Smokeless tobacco: Not on file   Substance Use Topics    Alcohol use: No       ROS:  no fevers  no photophobia, vision change  SKIN: no eythema    OBJECTIVE:  /88   Pulse 83   Temp 97.5  F (36.4  C) (Tympanic)   Resp 16   SpO2 97%    General: no acute distress  Eye exam: tender enlarged lump eyelid.  PERRLA, no photophobia, conjunctiva clear      ICD-10-CM    1. Hordeolum externum of right upper eyelid  H00.011 erythromycin (ROMYCIN) 5 MG/GM ophthalmic ointment     cephALEXin (KEFLEX) 500 MG capsule     Adult Eye  Referral          warms packs, f/u Opthalmology if persists as some sty's need I&D.

## 2024-11-13 ENCOUNTER — NURSE TRIAGE (OUTPATIENT)
Dept: NURSING | Facility: CLINIC | Age: 39
End: 2024-11-13

## 2024-11-13 NOTE — TELEPHONE ENCOUNTER
"    Nurse Triage SBAR    Is this a 2nd Level Triage? NO    Situation:  Sty    Background: Patient calls with concerns after being seen in urgent care for a stye in his eye. States that the area has \" popped \" and there is pus draining from the area into the patients eye. States that he felt sick afterwards.     Assessment: Patient calls with concerns after being seen in urgent care for a stye in his eye. States that the area has \" popped \" and there is pus draining from the area into the patients eye. States that he felt sick afterwards.     Protocol Recommended Disposition:   See PCP Within 24 Hours    Recommendation:  care advice given          Does the patient meet one of the following criteria for ADS visit consideration? 16+ years old, with an [facility] PCP     TIP  Providers, please consider if this condition is appropriate for management at one of our Acute and Diagnostic Services sites.     If patient is a good candidate, please use dotphrase <dot>triageresponse and select Refer to ADS to document.    Reason for Disposition   [1] Eyelid is very swollen AND [2] no fever    Additional Information   Negative: Patient sounds very sick or weak to the triager   Negative: [1] Eyelid is red AND [2] fever   Negative: [1] Eyelid is swollen AND [2] fever   Negative: Redness spreads around the eye (both upper and lower eyelid are red)   Negative: [1] Blurred vision AND [2] new or worsening   Negative: 2 or more styes are present    Protocols used: Olvin    "

## 2024-11-18 ENCOUNTER — TELEPHONE (OUTPATIENT)
Dept: PSYCHOLOGY | Facility: CLINIC | Age: 39
End: 2024-11-18
Payer: COMMERCIAL

## 2024-11-18 NOTE — TELEPHONE ENCOUNTER
Health Psychology                    Department of Medicine  Hortensia Adam, Ph.D., L.P. [phone]  [facility] Ingrid Zhao, Ph.D., L.P. [phone]  [place] Mail [address]   AlmazSage, Ph.D. [phone]  [address] [place] [address] Neri Baker, Ph.D., MEET.B.P.P., L.P. [phone]  [place] [address]           Mecca Cruz, Ph.D., L.P. [phone]     Leticia Pearson, Ph.D., A.B.P.P., L.P. [phone]  [facility]   Leno Tidwell, Ph.D., L.P. [phone]  [address] [place] [address]            Telephone Note    Pt failed to arrive for today's intake appointment. Multiple phone calls unanswered.  He did not respond to texts or to email sent yesterday.    Neri Baker, Ph.D., A.B.P.P., L.P.  Director, Health Psychology  [phone]

## 2025-01-13 DIAGNOSIS — E11.65 TYPE 2 DIABETES MELLITUS WITH HYPERGLYCEMIA, WITH LONG-TERM CURRENT USE OF INSULIN (H): Chronic | ICD-10-CM

## 2025-01-13 DIAGNOSIS — Z79.4 TYPE 2 DIABETES MELLITUS WITH HYPERGLYCEMIA, WITH LONG-TERM CURRENT USE OF INSULIN (H): Chronic | ICD-10-CM

## 2025-01-13 RX ORDER — ACYCLOVIR 800 MG/1
1 TABLET ORAL
Qty: 6 EACH | Refills: 0 | Status: SHIPPED | OUTPATIENT
Start: 2025-01-13

## 2025-01-17 DIAGNOSIS — E11.9 TYPE 2 DIABETES MELLITUS WITHOUT COMPLICATION, WITH LONG-TERM CURRENT USE OF INSULIN (H): ICD-10-CM

## 2025-01-17 DIAGNOSIS — Z79.4 TYPE 2 DIABETES MELLITUS WITHOUT COMPLICATION, WITH LONG-TERM CURRENT USE OF INSULIN (H): ICD-10-CM

## 2025-01-25 ENCOUNTER — HEALTH MAINTENANCE LETTER (OUTPATIENT)
Age: 40
End: 2025-01-25

## 2025-02-19 DIAGNOSIS — G62.9 PERIPHERAL POLYNEUROPATHY: ICD-10-CM

## 2025-02-19 RX ORDER — GABAPENTIN 300 MG/1
CAPSULE ORAL
Qty: 180 CAPSULE | Refills: 5 | OUTPATIENT
Start: 2025-02-19

## 2025-02-19 NOTE — TELEPHONE ENCOUNTER
In clinic or virtual OK for [id number] follow up? Last seen by PCP 7-2-2024-Please advise.Thanks, Andie Mesa, CMA

## 2025-04-14 DIAGNOSIS — Z86.73 HISTORY OF ISCHEMIC STROKE: Primary | ICD-10-CM

## 2025-04-14 DIAGNOSIS — E78.5 HYPERLIPIDEMIA, UNSPECIFIED HYPERLIPIDEMIA TYPE: ICD-10-CM

## 2025-04-14 DIAGNOSIS — I10 PRIMARY HYPERTENSION: ICD-10-CM

## 2025-04-14 RX ORDER — CARVEDILOL 6.25 MG/1
6.25 TABLET ORAL 2 TIMES DAILY WITH MEALS
Qty: 180 TABLET | Refills: 0 | Status: SHIPPED | OUTPATIENT
Start: 2025-04-14

## 2025-04-14 RX ORDER — LOSARTAN POTASSIUM 50 MG/1
50 TABLET ORAL DAILY
Qty: 90 TABLET | Refills: 0 | Status: SHIPPED | OUTPATIENT
Start: 2025-04-14

## 2025-04-16 RX ORDER — ATORVASTATIN CALCIUM 40 MG/1
40 TABLET, FILM COATED ORAL DAILY
Qty: 90 TABLET | Refills: 3 | Status: SHIPPED | OUTPATIENT
Start: 2025-04-16

## 2025-04-16 RX ORDER — SENNOSIDES 8.6 MG
325 CAPSULE ORAL DAILY
Qty: 90 TABLET | Refills: 3 | Status: SHIPPED | OUTPATIENT
Start: 2025-04-16

## 2025-05-03 ENCOUNTER — HEALTH MAINTENANCE LETTER (OUTPATIENT)
Age: 40
End: 2025-05-03

## 2025-05-06 ENCOUNTER — HOSPITAL ENCOUNTER (EMERGENCY)
Facility: CLINIC | Age: 40
Discharge: HOME | End: 2025-05-06
Attending: EMERGENCY MEDICINE | Admitting: EMERGENCY MEDICINE

## 2025-05-06 VITALS
WEIGHT: 235.67 LBS | OXYGEN SATURATION: 100 % | HEART RATE: 91 BPM | HEIGHT: 75 IN | RESPIRATION RATE: 16 BRPM | BODY MASS INDEX: 29.3 KG/M2 | DIASTOLIC BLOOD PRESSURE: 113 MMHG | TEMPERATURE: 97.8 F | SYSTOLIC BLOOD PRESSURE: 152 MMHG

## 2025-05-06 DIAGNOSIS — R42 DIZZINESS: ICD-10-CM

## 2025-05-06 DIAGNOSIS — R73.9 HYPERGLYCEMIA: ICD-10-CM

## 2025-05-06 DIAGNOSIS — E86.0 DEHYDRATION: ICD-10-CM

## 2025-05-06 DIAGNOSIS — N10 PYELONEPHRITIS, ACUTE: ICD-10-CM

## 2025-05-06 LAB
ALBUMIN UR-MCNC: 10 MG/DL
ANION GAP SERPL CALCULATED.3IONS-SCNC: 14 MMOL/L (ref 7–15)
APPEARANCE UR: CLEAR
ATRIAL RATE - MUSE: 94 BPM
B-OH-BUTYR SERPL-SCNC: 2.11 MMOL/L
BASE EXCESS BLDV CALC-SCNC: -1.2 MMOL/L (ref -3–3)
BASOPHILS # BLD AUTO: 0.1 10E3/UL (ref 0–0.2)
BASOPHILS NFR BLD AUTO: 0 %
BILIRUB UR QL STRIP: NEGATIVE
BUN SERPL-MCNC: 10.6 MG/DL (ref 6–20)
CALCIUM SERPL-MCNC: 8.4 MG/DL (ref 8.8–10.4)
CHLORIDE SERPL-SCNC: 93 MMOL/L (ref 98–107)
COLOR UR AUTO: ABNORMAL
CREAT SERPL-MCNC: 0.63 MG/DL (ref 0.67–1.17)
DIASTOLIC BLOOD PRESSURE - MUSE: NORMAL MMHG
EGFRCR SERPLBLD CKD-EPI 2021: >90 ML/MIN/1.73M2
EOSINOPHIL # BLD AUTO: 0 10E3/UL (ref 0–0.7)
EOSINOPHIL NFR BLD AUTO: 0 %
ERYTHROCYTE [DISTWIDTH] IN BLOOD BY AUTOMATED COUNT: 11.8 % (ref 10–15)
FLUAV RNA SPEC QL NAA+PROBE: NEGATIVE
FLUBV RNA RESP QL NAA+PROBE: NEGATIVE
GLUCOSE BLDC GLUCOMTR-MCNC: 261 MG/DL (ref 70–99)
GLUCOSE SERPL-MCNC: 322 MG/DL (ref 70–99)
GLUCOSE UR STRIP-MCNC: >=1000 MG/DL
HCO3 BLDV-SCNC: 24 MMOL/L (ref 21–28)
HCO3 SERPL-SCNC: 19 MMOL/L (ref 22–29)
HCT VFR BLD AUTO: 41.6 % (ref 40–53)
HGB BLD-MCNC: 14.5 G/DL (ref 13.3–17.7)
HGB UR QL STRIP: ABNORMAL
HIV 1+2 AB+HIV1 P24 AG SERPL QL IA: NONREACTIVE
HOLD SPECIMEN: NORMAL
HOLD SPECIMEN: NORMAL
IMM GRANULOCYTES # BLD: 0.1 10E3/UL
IMM GRANULOCYTES NFR BLD: 1 %
INTERPRETATION ECG - MUSE: NORMAL
KETONES UR STRIP-MCNC: >150 MG/DL
LACTATE SERPL-SCNC: 0.9 MMOL/L (ref 0.7–2)
LEUKOCYTE ESTERASE UR QL STRIP: ABNORMAL
LYMPHOCYTES # BLD AUTO: 1 10E3/UL (ref 0.8–5.3)
LYMPHOCYTES NFR BLD AUTO: 8 %
MAGNESIUM SERPL-MCNC: 1.5 MG/DL (ref 1.7–2.3)
MCH RBC QN AUTO: 30.1 PG (ref 26.5–33)
MCHC RBC AUTO-ENTMCNC: 34.9 G/DL (ref 31.5–36.5)
MCV RBC AUTO: 86 FL (ref 78–100)
MONOCYTES # BLD AUTO: 1.1 10E3/UL (ref 0–1.3)
MONOCYTES NFR BLD AUTO: 8 %
MUCOUS THREADS #/AREA URNS LPF: PRESENT /LPF
NEUTROPHILS # BLD AUTO: 11 10E3/UL (ref 1.6–8.3)
NEUTROPHILS NFR BLD AUTO: 83 %
NITRATE UR QL: NEGATIVE
NRBC # BLD AUTO: 0 10E3/UL
NRBC BLD AUTO-RTO: 0 /100
O2/TOTAL GAS SETTING VFR VENT: 0 %
OXYHGB MFR BLDV: 19 % (ref 70–75)
P AXIS - MUSE: 44 DEGREES
PCO2 BLDV: 43 MM HG (ref 40–50)
PH BLDV: 7.36 [PH] (ref 7.32–7.43)
PH UR STRIP: 5.5 [PH] (ref 5–7)
PLATELET # BLD AUTO: 329 10E3/UL (ref 150–450)
PO2 BLDV: 16 MM HG (ref 25–47)
POTASSIUM SERPL-SCNC: 3.7 MMOL/L (ref 3.4–5.3)
PR INTERVAL - MUSE: 128 MS
QRS DURATION - MUSE: 118 MS
QT - MUSE: 384 MS
QTC - MUSE: 480 MS
R AXIS - MUSE: 54 DEGREES
RBC # BLD AUTO: 4.82 10E6/UL (ref 4.4–5.9)
RBC URINE: 7 /HPF
RSV RNA SPEC NAA+PROBE: NEGATIVE
S PYO DNA THROAT QL NAA+PROBE: NOT DETECTED
SAO2 % BLDV: 19.6 % (ref 70–75)
SARS-COV-2 RNA RESP QL NAA+PROBE: NEGATIVE
SODIUM SERPL-SCNC: 126 MMOL/L (ref 135–145)
SP GR UR STRIP: 1.03 (ref 1–1.03)
SYSTOLIC BLOOD PRESSURE - MUSE: NORMAL MMHG
T AXIS - MUSE: 45 DEGREES
TROPONIN T SERPL HS-MCNC: 17 NG/L
TROPONIN T SERPL HS-MCNC: 22 NG/L
UROBILINOGEN UR STRIP-MCNC: NORMAL MG/DL
VENTRICULAR RATE- MUSE: 94 BPM
WBC # BLD AUTO: 13.3 10E3/UL (ref 4–11)
WBC URINE: 14 /HPF

## 2025-05-06 PROCEDURE — 258N000003 HC RX IP 258 OP 636: Performed by: EMERGENCY MEDICINE

## 2025-05-06 PROCEDURE — 99284 EMERGENCY DEPT VISIT MOD MDM: CPT | Mod: 25 | Performed by: EMERGENCY MEDICINE

## 2025-05-06 PROCEDURE — 93005 ELECTROCARDIOGRAM TRACING: CPT | Performed by: EMERGENCY MEDICINE

## 2025-05-06 PROCEDURE — 87591 N.GONORRHOEAE DNA AMP PROB: CPT | Performed by: EMERGENCY MEDICINE

## 2025-05-06 PROCEDURE — 83605 ASSAY OF LACTIC ACID: CPT | Performed by: EMERGENCY MEDICINE

## 2025-05-06 PROCEDURE — 36415 COLL VENOUS BLD VENIPUNCTURE: CPT | Performed by: EMERGENCY MEDICINE

## 2025-05-06 PROCEDURE — 250N000013 HC RX MED GY IP 250 OP 250 PS 637: Performed by: EMERGENCY MEDICINE

## 2025-05-06 PROCEDURE — 87154 CUL TYP ID BLD PTHGN 6+ TRGT: CPT | Performed by: EMERGENCY MEDICINE

## 2025-05-06 PROCEDURE — 96365 THER/PROPH/DIAG IV INF INIT: CPT | Performed by: EMERGENCY MEDICINE

## 2025-05-06 PROCEDURE — 87389 HIV-1 AG W/HIV-1&-2 AB AG IA: CPT | Performed by: EMERGENCY MEDICINE

## 2025-05-06 PROCEDURE — 82962 GLUCOSE BLOOD TEST: CPT

## 2025-05-06 PROCEDURE — 84484 ASSAY OF TROPONIN QUANT: CPT | Performed by: EMERGENCY MEDICINE

## 2025-05-06 PROCEDURE — 82805 BLOOD GASES W/O2 SATURATION: CPT | Performed by: EMERGENCY MEDICINE

## 2025-05-06 PROCEDURE — 87651 STREP A DNA AMP PROBE: CPT | Performed by: EMERGENCY MEDICINE

## 2025-05-06 PROCEDURE — 82947 ASSAY GLUCOSE BLOOD QUANT: CPT | Performed by: EMERGENCY MEDICINE

## 2025-05-06 PROCEDURE — 250N000011 HC RX IP 250 OP 636: Performed by: EMERGENCY MEDICINE

## 2025-05-06 PROCEDURE — 87186 SC STD MICRODIL/AGAR DIL: CPT | Performed by: EMERGENCY MEDICINE

## 2025-05-06 PROCEDURE — 81001 URINALYSIS AUTO W/SCOPE: CPT | Performed by: EMERGENCY MEDICINE

## 2025-05-06 PROCEDURE — 96367 TX/PROPH/DG ADDL SEQ IV INF: CPT | Performed by: EMERGENCY MEDICINE

## 2025-05-06 PROCEDURE — 85025 COMPLETE CBC W/AUTO DIFF WBC: CPT | Performed by: EMERGENCY MEDICINE

## 2025-05-06 PROCEDURE — 82010 KETONE BODYS QUAN: CPT | Performed by: EMERGENCY MEDICINE

## 2025-05-06 PROCEDURE — 96361 HYDRATE IV INFUSION ADD-ON: CPT | Performed by: EMERGENCY MEDICINE

## 2025-05-06 PROCEDURE — 83735 ASSAY OF MAGNESIUM: CPT | Performed by: EMERGENCY MEDICINE

## 2025-05-06 PROCEDURE — 87637 SARSCOV2&INF A&B&RSV AMP PRB: CPT | Performed by: EMERGENCY MEDICINE

## 2025-05-06 PROCEDURE — 87491 CHLMYD TRACH DNA AMP PROBE: CPT | Performed by: EMERGENCY MEDICINE

## 2025-05-06 RX ORDER — CEFPODOXIME PROXETIL 200 MG/1
200 TABLET, FILM COATED ORAL 2 TIMES DAILY
Qty: 20 TABLET | Refills: 0 | Status: ON HOLD | OUTPATIENT
Start: 2025-05-06 | End: 2025-05-16

## 2025-05-06 RX ORDER — MAGNESIUM SULFATE HEPTAHYDRATE 40 MG/ML
2 INJECTION, SOLUTION INTRAVENOUS ONCE
Status: COMPLETED | OUTPATIENT
Start: 2025-05-06 | End: 2025-05-06

## 2025-05-06 RX ORDER — DOXYCYCLINE 100 MG/1
100 CAPSULE ORAL ONCE
Status: COMPLETED | OUTPATIENT
Start: 2025-05-06 | End: 2025-05-06

## 2025-05-06 RX ORDER — DOXYCYCLINE 100 MG/1
100 CAPSULE ORAL 2 TIMES DAILY
Qty: 14 CAPSULE | Refills: 0 | Status: SHIPPED | OUTPATIENT
Start: 2025-05-06 | End: 2025-05-08

## 2025-05-06 RX ORDER — CEFTRIAXONE 2 G/1
2 INJECTION, POWDER, FOR SOLUTION INTRAMUSCULAR; INTRAVENOUS ONCE
Status: COMPLETED | OUTPATIENT
Start: 2025-05-06 | End: 2025-05-06

## 2025-05-06 RX ADMIN — CEFTRIAXONE 2 G: 2 INJECTION, POWDER, FOR SOLUTION INTRAMUSCULAR; INTRAVENOUS at 15:02

## 2025-05-06 RX ADMIN — MAGNESIUM SULFATE HEPTAHYDRATE 2 G: 40 INJECTION, SOLUTION INTRAVENOUS at 15:49

## 2025-05-06 RX ADMIN — SODIUM CHLORIDE 1000 ML: 9 INJECTION, SOLUTION INTRAVENOUS at 13:25

## 2025-05-06 RX ADMIN — DOXYCYCLINE HYCLATE 100 MG: 100 CAPSULE ORAL at 16:17

## 2025-05-06 ASSESSMENT — ACTIVITIES OF DAILY LIVING (ADL)
ADLS_ACUITY_SCORE: 46

## 2025-05-06 NOTE — DISCHARGE INSTRUCTIONS
Please monitor your symptoms very closely.  I would recommend beginning antibiotics to treat possible kidney infection and also use doxycycline to cover for possible sexually transmitted causes.    Your gonorrhea, chlamydia, and HIV testing is currently in process.    Please continue to check your blood sugars regularly and continue with the insulin you have been prescribed.    Be sure to drink adequate fluids and stay hydrated.    Follow-up with primary care provider in 2 to 3 days for recheck.    Return to the ER if you develop any worsening symptoms.  This is very important.  I did recommend MRI today and I cannot definitively exclude other cause for your dizziness such as stroke.    You are always welcome to come back to the ER.

## 2025-05-06 NOTE — ED TRIAGE NOTES
Patient reports multiple symptoms upon triage.  He reports ongoing fatigue, dizziness, and nausea for over 1 week.  He reports intermittent palpitations.  Reports back pain.  Also reports increased urinary frequency, with pain at times.  Hx DM.  ABCs intact, A&Ox4     Triage Assessment (Adult)       Row Name 05/06/25 1146          Triage Assessment    Airway WDL WDL        Respiratory WDL    Respiratory WDL WDL        Skin Circulation/Temperature WDL    Skin Circulation/Temperature WDL WDL        Cardiac WDL    Cardiac WDL X        Peripheral/Neurovascular WDL    Peripheral Neurovascular WDL WDL        Cognitive/Neuro/Behavioral WDL    Cognitive/Neuro/Behavioral WDL WDL

## 2025-05-06 NOTE — ED PROVIDER NOTES
"  Emergency Department Note      History of Present Illness     Chief Complaint   Fatigue and Dizziness      HPI   Deni Coates is a 40 year old male with a history of type 2 diabetes mellitus, previous CVA, who presents to the emergency department for evaluation of fatigue and dizziness.  Patient reports that his symptoms first began about 1.5 weeks previous.  He noted symptoms of bodyaches throughout his entire body, particularly his arms, and legs.  He noted transient episode of numbness to his bilateral forearms which is since resolved.  Beginning mid last week, he noted symptoms of bilateral flank pain, increased urinary frequency as well as burning with urination.  Around that time, he also noted feelings of dizziness which she describes as lightheadedness, particularly with changes in body position such as sitting to standing.  When he holds himself still, he states his dizziness notably improved.  He denies unilateral numbness or weakness.  He is questioning the possibility of an underlying UTI.  He did feel warm this morning, and significant other at bedside felt that he was quite warm and that she was \"kissing an oven\".  Patient notes a mild sore throat, though otherwise denies any cough and rhinorrhea.  With aggressive patient's blood sugars, he reports his blood sugars have been ranging from 340-400 when he feels off.  He reports compliance with his insulin regimen, though notes he did have a period a few months back where he was out of his insulin for about 2 months.  He now has this available to him.  Patient and significant other note that their children was diagnosed with strep 2 weeks previous.  He denies any vomiting or diarrhea.  He denies any recent foreign travel.  Denies any history of illicit drug use or IV drug use to this writer.  Denies any other complaints or concerns at this time.    On further questioning with the patient alone, he does acknowledge recent contact with another woman, " "reportedly sexually active with multiple other individuals.  This occurred about 1 month previous.  He wonders if he could have a sexually transmitted infection.  He is unaware of any specific diagnosis this individual may have had.  He denies any penile rash or genitourinary lesions.    Independent Historian   Wife as detailed above.    Review of External Notes   I reviewed 10/28/24  note for lightheadedness, dizziness, body aches, and palpitations. Patient has a history of type 2 diabetes and previous stroke in 2020.    Past Medical History     Medical History and Problem List   History of ischemic stroke   Hyperlipidemia  Hypertension  Peripheral polyneuropathy  Type 2 diabetes mellitus with hyperglycemia, with long-term current use of insulin (H)  MDD  SHAWNA  Osteomyelitis of toe of left foot    Medications   ASA  Lipitor  Coreg  Prozac  Neurontin  Cozaar  Compazine    Surgical History   No past surgical history on file.    Physical Exam     Patient Vitals for the past 24 hrs:   BP Temp Temp src Pulse Resp SpO2 Height Weight   05/06/25 1736 (!) 152/113 -- -- -- -- 100 % -- --   05/06/25 1706 134/79 -- -- -- -- 99 % -- --   05/06/25 1645 -- -- -- -- -- 98 % -- --   05/06/25 1630 (!) 146/97 -- -- 91 -- 98 % -- --   05/06/25 1600 133/89 -- -- 88 -- 97 % -- --   05/06/25 1550 -- -- -- -- -- 98 % -- --   05/06/25 1545 -- -- -- -- -- 98 % -- --   05/06/25 1530 (!) 138/95 -- -- 88 -- 97 % -- --   05/06/25 1455 128/89 -- -- -- -- 96 % -- --   05/06/25 1450 (!) 142/94 -- -- -- -- 98 % -- --   05/06/25 1417 (!) 130/95 -- -- -- -- 98 % -- --   05/06/25 1145 (!) 134/93 97.8  F (36.6  C) Temporal 108 16 99 % 1.905 m (6' 3\") 106.9 kg (235 lb 10.8 oz)     Physical Exam  General:              Well-nourished              Speaking in full sentences              No dysarthria or aphasia  Eyes:              Conjunctiva without injection or scleral icterus  ENT:              Moist mucous membranes              Mild posterior " oropharyngeal erythema              Nares patent              Pinnae normal  Neck:              Full ROM              No stiffness appreciated              No lymphadenopathy  Resp:              Lungs CTAB              No crackles, wheezing or audible rubs              Good air movement  CV:                    Tachycardic rate, regular rhythm              S1 and S2 present              No murmur, gallop or rub  GI:              BS present              Abdomen soft without distention              Non-tender to light and deep palpation              No guarding or rebound tenderness  Skin:              Warm, dry, well perfused              No rashes or open wounds on exposed skin  MSK:              Moves all extremities              Mild tenderness to bilateral upper lumbar region   No overlying skin changes   No midline spinal tenderness  Neuro:              Alert              Facial muscles symmetric              Tongue midline              No spontaneous ocular nystagmus              No aphasia              5/5  strength, OK sign, thumbs up              SILT in BUE              5/5 hip flexion              SILT in BLE              Negative Romberg              Gait stable  Psych:              Normal affect, normal mood      Diagnostics     Lab Results   Labs Ordered and Resulted from Time of ED Arrival to Time of ED Departure   BASIC METABOLIC PANEL - Abnormal       Result Value    Sodium 126 (*)     Potassium 3.7      Chloride 93 (*)     Carbon Dioxide (CO2) 19 (*)     Anion Gap 14      Urea Nitrogen 10.6      Creatinine 0.63 (*)     GFR Estimate >90      Calcium 8.4 (*)     Glucose 322 (*)    ROUTINE UA WITH MICROSCOPIC REFLEX TO CULTURE - Abnormal    Color Urine Light Yellow      Appearance Urine Clear      Glucose Urine >=1000 (*)     Bilirubin Urine Negative      Ketones Urine >150 (*)     Specific Gravity Urine 1.035      Blood Urine Small (*)     pH Urine 5.5      Protein Albumin Urine 10 (*)      Urobilinogen Urine Normal      Nitrite Urine Negative      Leukocyte Esterase Urine Trace (*)     Mucus Urine Present (*)     RBC Urine 7 (*)     WBC Urine 14 (*)    CBC WITH PLATELETS AND DIFFERENTIAL - Abnormal    WBC Count 13.3 (*)     RBC Count 4.82      Hemoglobin 14.5      Hematocrit 41.6      MCV 86      MCH 30.1      MCHC 34.9      RDW 11.8      Platelet Count 329      % Neutrophils 83      % Lymphocytes 8      % Monocytes 8      % Eosinophils 0      % Basophils 0      % Immature Granulocytes 1      NRBCs per 100 WBC 0      Absolute Neutrophils 11.0 (*)     Absolute Lymphocytes 1.0      Absolute Monocytes 1.1      Absolute Eosinophils 0.0      Absolute Basophils 0.1      Absolute Immature Granulocytes 0.1      Absolute NRBCs 0.0     KETONE BETA-HYDROXYBUTYRATE QUANTITATIVE, RAPID - Abnormal    Ketone (Beta-Hydroxybutyrate) Quantitative 2.11 (*)    MAGNESIUM - Abnormal    Magnesium 1.5 (*)    BLOOD GAS VENOUS - Abnormal    pH Venous 7.36      pCO2 Venous 43      pO2 Venous 16 (*)     Bicarbonate Venous 24      Base Excess/Deficit Venous -1.2      FIO2 0      Oxyhemoglobin Venous 19 (*)     O2 Sat, Venous 19.6 (*)    GLUCOSE BY METER - Abnormal    GLUCOSE BY METER POCT 261 (*)    TROPONIN T, HIGH SENSITIVITY - Normal    Troponin T, High Sensitivity 22     INFLUENZA A/B, RSV AND SARS-COV2 PCR - Normal    Influenza A PCR Negative      Influenza B PCR Negative      RSV PCR Negative      SARS CoV2 PCR Negative     TROPONIN T, HIGH SENSITIVITY - Normal    Troponin T, High Sensitivity 17     LACTIC ACID WHOLE BLOOD WITH 1X REPEAT IN 2 HR WHEN >2 - Normal    Lactic Acid, Initial 0.9     GROUP A STREPTOCOCCUS PCR THROAT SWAB - Normal    Group A strep by PCR Not Detected     HIV ANTIGEN ANTIBODY COMBO   URINE CULTURE   BLOOD CULTURE   BLOOD CULTURE   CHLAMYDIA TRACHOMATIS PCR   NEISSERIA GONORRHOEAE PCR       Imaging   No orders to display       EKG   ECG taken at 1205, ECG read at 1210  Normal sinus  rhythm  Nonspecific intraventricular conduction delay  QTcB >= 480 msec   Abnormal ECG  No significant changes as compared to prior, dated 10/28/24.  Rate 94 bpm. MN interval 128 ms. QRS duration 118 ms. QT/QTc 384/480 ms. P-R-T axes 44 54 45.    Independent Interpretation   None    ED Course      Medications Administered   Medications   sodium chloride 0.9% BOLUS 1,000 mL (0 mLs Intravenous Stopped 5/6/25 1452)   magnesium sulfate 2 g in 50 mL sterile water intermittent infusion (0 g Intravenous Stopped 5/6/25 1639)   cefTRIAXone (ROCEPHIN) 2 g vial to attach to  ml bag for ADULTS or NS 50 ml bag for PEDS (0 g Intravenous Stopped 5/6/25 1545)   doxycycline hyclate (VIBRAMYCIN) capsule 100 mg (100 mg Oral $Given 5/6/25 1617)       Procedures   Procedures     Discussion of Management   None    ED Course   ED Course as of 05/06/25 2131 Tue May 06, 2025   1215 I obtained history and examined the patient as noted above.     1422 I rechecked the patient and updated him on findings. He is feeling better after fluids.   1508 I rechecked the patient. He is feeling better. No longer dizzy. He feels that the fluids have helped. We discussed his urinary symptoms, and he mentions bilateral flank pain. Patient is sexually active and was involved with another woman at a bar. This woman is sexually active with multiple partners. He is unsure if she has any official diagnoses, but he expresses concern for STIs.   1550 I rechecked the patient. He is up and walking.   1716 Patient reevaluated.  He is feeling much better after IV fluids.  We discussed recommendation for MRI, the patient is declining at this time.  I also offered hospital observation for fluid administration and close monitoring of symptoms which patient is also declining.  He is preferring discharge home at this time.       Additional Documentation  None    Medical Decision Making / Diagnosis     CMS Diagnoses: None    MIPS       None    RADHA ROOT  Jaxon is a 40 year old male with a past medical history significant for DM type II, previous stroke, who presents to the emergency department for evaluation of fatigue and dizziness.  VS on presentation reveal elevated BP, HR of 108, though absence of fever and otherwise are unremarkable.  A broad differential diagnosis was considered regarding patient's presenting symptoms including though was not limited to, dehydration, electrolyte abnormality, DKA, hyperglycemia, sepsis/infectious causes, STI/STD, intracranial pathology (CVA/TIA), vascular dissection, ACS, among others.  Workup included laboratory studies as well as EKG.  Ultimately, the precise cause for patient's symptomatology is not entirely clear, though I do suspect it is likely multifactorial.  Patient does have a history of DM type II, and initial labs confirm hyperglycemia.  I suspect this has resulted in osmotic diuresis and subsequent dehydration as his metabolic panel as well as elevated ketones are suggestive of dehydration.  Following 1 L of normal saline, the patient reports feeling markedly improved.  He does acknowledge urinary symptoms as well as exhibiting bilateral flank tenderness for which pyelonephritis is also on the differential.  His urinalysis did return abnormal, with culture pending.  He was treated empirically with 2 g of IV Rocephin as well as doxycycline to cover both typical bacterial etiologies but also given recent sexual exposure, possible GC/chlamydia.  These tests were additionally obtained the results of which are presently pending as is HIV testing.  He does have microscopic hematuria, though his clinical presentation is not highly suggestive of ureteral colic.  Review of previous CT from 2020 revealed no evidence of nephrolithiasis, and patient is resting quite comfortably, with improvements in back pain following IV fluids, and not displaying pain out of proportion to exam or writhing around on the gurney as would be  typically expected with ureteral colic.  For that reason I do feel CT of the abdomen can be deferred safely at this time.  His WBC count is mildly elevated, also suggestive of infectious process although lactate is presently normal.  Blood cultures have been obtained the results of which are presently pending.  On initial repeat evaluation he does not exhibit midline spinal tenderness no overlying skin changes.  I considered osteomyelitis, epidural abscess or discitis, though clinically feel this to be unlikely.  He has no radicular symptoms and is ambulatory with a steady stable gait.  He denies symptoms of chest pain or chest pressure.  His EKG demonstrates sinus rhythm, nonspecific intraventricular conduction delay, though no acute change compared with previous EKG from 2024.  His initial and repeat high-sensitivity troponin have returned normal.  COVID and influenza testing negative as well as strep testing.  No signs of deeper space neck infection.  With regards to patient's dizziness, I do favor this most likely represents volume depletion in the context of the above illness and decreased oral intake.  He does have a history of stroke for which I did offer and suggest MRI to exclude this etiology the patient is declining at this time.  He demonstrates the capacity to understand the options presented to him and his decision appears to align with his values.  He reports having been in the ER for quite some time today and is preferring discharge home.  Overall suspicion for CVA is quite low as he is ambulatory with a steady stable gait and exhibits no focal neurologic deficits on initial repeat evaluation.  I did offer hospital observation as well given his elevated ketones, and hyperglycemia to ensure adequate correction and further improvements in symptoms.  Again he is preferring discharge home at this time.  He has been able to tolerate p.o.  He affirms that he will return to the ER should any new or  troubling symptoms arise.  His blood sugar did improve following 1 L normal saline.  Patient affirms he has enough of his insulin at home and is comfortable with instructions in how to use this.  I recommended a stressed the importance of close outpatient follow-up with primary care provider in 2 to 3 days for recheck.  Will discharge home with cefpodoxime for management of pyelonephritis as well as doxycycline to cover for possible sexually transmitted etiologies.  He is certainly welcome to return to the ER in the meantime should any new or troubling symptoms arise.  All of his questions been answered prior to discharge.    Disposition   The patient was discharged.     Diagnosis     ICD-10-CM    1. Hyperglycemia  R73.9       2. Dehydration  E86.0       3. Pyelonephritis, acute  N10       4. Dizziness  R42            Discharge Medications   Discharge Medication List as of 5/6/2025  6:12 PM        START taking these medications    Details   cefpodoxime (VANTIN) 200 MG tablet Take 1 tablet (200 mg) by mouth 2 times daily for 10 days., Disp-20 tablet, R-0, E-Prescribe      doxycycline hyclate (VIBRAMYCIN) 100 MG capsule Take 1 capsule (100 mg) by mouth 2 times daily for 7 days., Disp-14 capsule, R-0, E-Prescribe               Scribe Disclosure:  I, Susan Oliver, am serving as a scribe at 2:25 PM on 5/6/2025 to document services personally performed by Lux Yung MD based on my observations and the provider's statements to me.        Lux Yung MD  05/06/25 9352

## 2025-05-07 ENCOUNTER — RESULTS FOLLOW-UP (OUTPATIENT)
Dept: NURSING | Facility: CLINIC | Age: 40
End: 2025-05-07

## 2025-05-07 LAB
ACB COMPLEX DNA BLD POS QL NAA+NON-PROBE: NOT DETECTED
B FRAGILIS DNA BLD POS QL NAA+NON-PROBE: NOT DETECTED
C ALBICANS DNA BLD POS QL NAA+NON-PROBE: NOT DETECTED
C AURIS DNA BLD POS QL NAA+NON-PROBE: NOT DETECTED
C GATTII+NEOFOR DNA BLD POS QL NAA+N-PRB: NOT DETECTED
C GLABRATA DNA BLD POS QL NAA+NON-PROBE: NOT DETECTED
C KRUSEI DNA BLD POS QL NAA+NON-PROBE: NOT DETECTED
C PARAP DNA BLD POS QL NAA+NON-PROBE: NOT DETECTED
C TRACH DNA SPEC QL NAA+PROBE: NEGATIVE
C TROPICLS DNA BLD POS QL NAA+NON-PROBE: NOT DETECTED
E CLOAC COMP DNA BLD POS NAA+NON-PROBE: NOT DETECTED
E COLI DNA BLD POS QL NAA+NON-PROBE: NOT DETECTED
E FAECALIS DNA BLD POS QL NAA+NON-PROBE: NOT DETECTED
E FAECIUM DNA BLD POS QL NAA+NON-PROBE: NOT DETECTED
ENTEROBACTERALES DNA BLD POS NAA+N-PRB: NOT DETECTED
GP B STREP DNA BLD POS QL NAA+NON-PROBE: NOT DETECTED
HAEM INFLU DNA BLD POS QL NAA+NON-PROBE: NOT DETECTED
K OXYTOCA DNA BLD POS QL NAA+NON-PROBE: NOT DETECTED
KLEBSIELLA SP DNA BLD POS QL NAA+NON-PRB: NOT DETECTED
KLEBSIELLA SP DNA BLD POS QL NAA+NON-PRB: NOT DETECTED
L MONOCYTOG DNA BLD POS QL NAA+NON-PROBE: NOT DETECTED
MECA+MECC+MREJ ISLT/SPM QL: NOT DETECTED
N GONORRHOEA DNA SPEC QL NAA+PROBE: NEGATIVE
N MEN DNA BLD POS QL NAA+NON-PROBE: NOT DETECTED
P AERUGINOSA DNA BLD POS NAA+NON-PROBE: NOT DETECTED
PROTEUS SP DNA BLD POS QL NAA+NON-PROBE: NOT DETECTED
S AUREUS DNA BLD POS QL NAA+NON-PROBE: DETECTED
S EPIDERMIDIS DNA BLD POS QL NAA+NON-PRB: NOT DETECTED
S LUGDUNENSIS DNA BLD POS QL NAA+NON-PRB: NOT DETECTED
S MALTOPHILIA DNA BLD POS QL NAA+NON-PRB: NOT DETECTED
S MARCESCENS DNA BLD POS NAA+NON-PROBE: NOT DETECTED
S PNEUM DNA BLD POS QL NAA+NON-PROBE: NOT DETECTED
S PYO DNA BLD POS QL NAA+NON-PROBE: NOT DETECTED
SALMONELLA DNA BLD POS QL NAA+NON-PROBE: NOT DETECTED
SPECIMEN TYPE: NORMAL
SPECIMEN TYPE: NORMAL
STREPTOCOCCUS DNA BLD POS NAA+NON-PROBE: NOT DETECTED

## 2025-05-07 NOTE — ED NOTES
ED VISIT ADDENDUM:    I received a critical result of abnormal blood cultures.  I called the patient at 945 this morning.  They received a voicemail and left a message to call us back immediately and to return for further evaluation, as bacteremia can be a sign of more serious infection.    Ciro Ag MD  [facility]  [facility] Emergency Department       Ciro Ag MD  05/07/25 9488

## 2025-05-07 NOTE — ED NOTES
ED VISIT ADDENDUM:    After the first voicemail, the patient did call back.  I encouraged him to return return to the emergency department for further evaluation.  He notes that he is feeling better and would like to wait and see how he feels later today to make a decision.  He demonstrates decision-making capacity and understanding of the potential issue of bacteremia.    I called the patient at 1:20 PM to update him regarding a second positive blood culture.  He did not answer but I left a voicemail indicating that 2 of 2 positive blood culture is a strong suggestion of a bacteremia, serious and life-threatening infection.  I encouraged him to call back and to return for IV antibiotics and admission.    Ciro Ag MD  [facility]  [facility] Emergency Department       Ciro Ag MD  05/07/25 0766

## 2025-05-08 ENCOUNTER — APPOINTMENT (OUTPATIENT)
Dept: CARDIOLOGY | Facility: CLINIC | Age: 40
End: 2025-05-08
Attending: INTERNAL MEDICINE
Payer: MEDICAID

## 2025-05-08 ENCOUNTER — HOSPITAL ENCOUNTER (INPATIENT)
Facility: CLINIC | Age: 40
End: 2025-05-08
Attending: EMERGENCY MEDICINE | Admitting: INTERNAL MEDICINE
Payer: MEDICAID

## 2025-05-08 VITALS
SYSTOLIC BLOOD PRESSURE: 152 MMHG | HEART RATE: 85 BPM | RESPIRATION RATE: 16 BRPM | HEIGHT: 75 IN | BODY MASS INDEX: 28.31 KG/M2 | TEMPERATURE: 98.6 F | OXYGEN SATURATION: 98 % | WEIGHT: 227.7 LBS | DIASTOLIC BLOOD PRESSURE: 90 MMHG

## 2025-05-08 DIAGNOSIS — R78.81 POSITIVE BLOOD CULTURE: ICD-10-CM

## 2025-05-08 DIAGNOSIS — R78.81 MSSA BACTEREMIA: Primary | ICD-10-CM

## 2025-05-08 DIAGNOSIS — B95.61 MSSA BACTEREMIA: Primary | ICD-10-CM

## 2025-05-08 DIAGNOSIS — E11.9 TYPE 2 DIABETES MELLITUS WITHOUT COMPLICATION, WITHOUT LONG-TERM CURRENT USE OF INSULIN (H): ICD-10-CM

## 2025-05-08 DIAGNOSIS — N12 PYELONEPHRITIS: ICD-10-CM

## 2025-05-08 LAB
ALBUMIN SERPL BCG-MCNC: 3.5 G/DL (ref 3.5–5.2)
ALP SERPL-CCNC: 128 U/L (ref 40–150)
ALT SERPL W P-5'-P-CCNC: 20 U/L (ref 0–70)
ANION GAP SERPL CALCULATED.3IONS-SCNC: 16 MMOL/L (ref 7–15)
AST SERPL W P-5'-P-CCNC: 14 U/L (ref 0–45)
BACTERIA BLD CULT: ABNORMAL
BACTERIA UR CULT: ABNORMAL
BACTERIA UR CULT: ABNORMAL
BASOPHILS # BLD AUTO: 0 10E3/UL (ref 0–0.2)
BASOPHILS NFR BLD AUTO: 0 %
BILIRUB SERPL-MCNC: 0.3 MG/DL
BUN SERPL-MCNC: 16.8 MG/DL (ref 6–20)
CALCIUM SERPL-MCNC: 8.9 MG/DL (ref 8.8–10.4)
CHLORIDE SERPL-SCNC: 96 MMOL/L (ref 98–107)
CREAT SERPL-MCNC: 0.55 MG/DL (ref 0.67–1.17)
EGFRCR SERPLBLD CKD-EPI 2021: >90 ML/MIN/1.73M2
EOSINOPHIL # BLD AUTO: 0.1 10E3/UL (ref 0–0.7)
EOSINOPHIL NFR BLD AUTO: 1 %
ERYTHROCYTE [DISTWIDTH] IN BLOOD BY AUTOMATED COUNT: 11.9 % (ref 10–15)
FLUAV RNA SPEC QL NAA+PROBE: NEGATIVE
FLUBV RNA RESP QL NAA+PROBE: NEGATIVE
GLUCOSE BLDC GLUCOMTR-MCNC: 270 MG/DL (ref 70–99)
GLUCOSE BLDC GLUCOMTR-MCNC: 372 MG/DL (ref 70–99)
GLUCOSE SERPL-MCNC: 351 MG/DL (ref 70–99)
HCO3 BLDV-SCNC: 19 MMOL/L (ref 21–28)
HCO3 SERPL-SCNC: 20 MMOL/L (ref 22–29)
HCT VFR BLD AUTO: 41 % (ref 40–53)
HGB BLD-MCNC: 14.2 G/DL (ref 13.3–17.7)
HOLD SPECIMEN: NORMAL
HOLD SPECIMEN: NORMAL
IMM GRANULOCYTES # BLD: 0 10E3/UL
IMM GRANULOCYTES NFR BLD: 0 %
KETONES UR STRIP-MCNC: 100 MG/DL
LACTATE BLD-SCNC: 0.6 MMOL/L (ref 0.7–2)
LVEF ECHO: NORMAL
LYMPHOCYTES # BLD AUTO: 1.5 10E3/UL (ref 0.8–5.3)
LYMPHOCYTES NFR BLD AUTO: 16 %
MCH RBC QN AUTO: 29.6 PG (ref 26.5–33)
MCHC RBC AUTO-ENTMCNC: 34.6 G/DL (ref 31.5–36.5)
MCV RBC AUTO: 85 FL (ref 78–100)
MONOCYTES # BLD AUTO: 0.9 10E3/UL (ref 0–1.3)
MONOCYTES NFR BLD AUTO: 9 %
NEUTROPHILS # BLD AUTO: 7.1 10E3/UL (ref 1.6–8.3)
NEUTROPHILS NFR BLD AUTO: 74 %
NRBC # BLD AUTO: 0 10E3/UL
NRBC BLD AUTO-RTO: 0 /100
PCO2 BLDV: 33 MM HG (ref 40–50)
PH BLDV: 7.37 [PH] (ref 7.32–7.43)
PLATELET # BLD AUTO: 349 10E3/UL (ref 150–450)
PO2 BLDV: 55 MM HG (ref 25–47)
POTASSIUM SERPL-SCNC: 4.1 MMOL/L (ref 3.4–5.3)
PROT SERPL-MCNC: 7.2 G/DL (ref 6.4–8.3)
RBC # BLD AUTO: 4.8 10E6/UL (ref 4.4–5.9)
RSV RNA SPEC NAA+PROBE: NEGATIVE
S PYO DNA THROAT QL NAA+PROBE: NOT DETECTED
SAO2 % BLDV: 88 % (ref 70–75)
SARS-COV-2 RNA RESP QL NAA+PROBE: NEGATIVE
SODIUM SERPL-SCNC: 132 MMOL/L (ref 135–145)
WBC # BLD AUTO: 9.7 10E3/UL (ref 4–11)

## 2025-05-08 PROCEDURE — 85025 COMPLETE CBC W/AUTO DIFF WBC: CPT | Performed by: EMERGENCY MEDICINE

## 2025-05-08 PROCEDURE — 250N000011 HC RX IP 250 OP 636: Performed by: INTERNAL MEDICINE

## 2025-05-08 PROCEDURE — 87040 BLOOD CULTURE FOR BACTERIA: CPT | Performed by: EMERGENCY MEDICINE

## 2025-05-08 PROCEDURE — 99222 1ST HOSP IP/OBS MODERATE 55: CPT | Performed by: INTERNAL MEDICINE

## 2025-05-08 PROCEDURE — 36415 COLL VENOUS BLD VENIPUNCTURE: CPT | Performed by: EMERGENCY MEDICINE

## 2025-05-08 PROCEDURE — 120N000001 HC R&B MED SURG/OB

## 2025-05-08 PROCEDURE — 250N000012 HC RX MED GY IP 250 OP 636 PS 637: Performed by: INTERNAL MEDICINE

## 2025-05-08 PROCEDURE — 93306 TTE W/DOPPLER COMPLETE: CPT

## 2025-05-08 PROCEDURE — 99291 CRITICAL CARE FIRST HOUR: CPT | Mod: 25

## 2025-05-08 PROCEDURE — 82962 GLUCOSE BLOOD TEST: CPT

## 2025-05-08 PROCEDURE — 81003 URINALYSIS AUTO W/O SCOPE: CPT | Performed by: EMERGENCY MEDICINE

## 2025-05-08 PROCEDURE — 250N000013 HC RX MED GY IP 250 OP 250 PS 637: Performed by: INTERNAL MEDICINE

## 2025-05-08 PROCEDURE — 258N000003 HC RX IP 258 OP 636: Performed by: INTERNAL MEDICINE

## 2025-05-08 PROCEDURE — 87637 SARSCOV2&INF A&B&RSV AMP PRB: CPT | Performed by: EMERGENCY MEDICINE

## 2025-05-08 PROCEDURE — 250N000011 HC RX IP 250 OP 636: Performed by: EMERGENCY MEDICINE

## 2025-05-08 PROCEDURE — 96374 THER/PROPH/DIAG INJ IV PUSH: CPT

## 2025-05-08 PROCEDURE — 93306 TTE W/DOPPLER COMPLETE: CPT | Mod: 26 | Performed by: INTERNAL MEDICINE

## 2025-05-08 PROCEDURE — 87651 STREP A DNA AMP PROBE: CPT | Performed by: EMERGENCY MEDICINE

## 2025-05-08 PROCEDURE — 82565 ASSAY OF CREATININE: CPT | Performed by: EMERGENCY MEDICINE

## 2025-05-08 PROCEDURE — 83605 ASSAY OF LACTIC ACID: CPT

## 2025-05-08 RX ORDER — FLUOXETINE HYDROCHLORIDE 40 MG/1
40 CAPSULE ORAL DAILY PRN
Status: ON HOLD | COMMUNITY

## 2025-05-08 RX ORDER — ACETAMINOPHEN 500 MG
1000 TABLET ORAL EVERY 6 HOURS PRN
Status: ACTIVE | OUTPATIENT
Start: 2025-05-08

## 2025-05-08 RX ORDER — INSULIN ASPART 100 [IU]/ML
10-15 INJECTION, SOLUTION INTRAVENOUS; SUBCUTANEOUS
Status: ON HOLD | COMMUNITY

## 2025-05-08 RX ORDER — SENNOSIDES 8.6 MG
325 CAPSULE ORAL DAILY
Status: ACTIVE | OUTPATIENT
Start: 2025-05-09

## 2025-05-08 RX ORDER — ONDANSETRON 2 MG/ML
4 INJECTION INTRAMUSCULAR; INTRAVENOUS EVERY 6 HOURS PRN
Status: ACTIVE | OUTPATIENT
Start: 2025-05-08

## 2025-05-08 RX ORDER — CEFTRIAXONE 1 G/1
1 INJECTION, POWDER, FOR SOLUTION INTRAMUSCULAR; INTRAVENOUS EVERY 24 HOURS
Status: DISPENSED | OUTPATIENT
Start: 2025-05-08

## 2025-05-08 RX ORDER — AMOXICILLIN 250 MG
2 CAPSULE ORAL 2 TIMES DAILY PRN
Status: ACTIVE | OUTPATIENT
Start: 2025-05-08

## 2025-05-08 RX ORDER — CALCIUM CARBONATE 500 MG/1
1000 TABLET, CHEWABLE ORAL 4 TIMES DAILY PRN
Status: ACTIVE | OUTPATIENT
Start: 2025-05-08

## 2025-05-08 RX ORDER — ATORVASTATIN CALCIUM 40 MG/1
40 TABLET, FILM COATED ORAL DAILY
Status: ACTIVE | OUTPATIENT
Start: 2025-05-09

## 2025-05-08 RX ORDER — ACETAMINOPHEN 650 MG/1
650 SUPPOSITORY RECTAL EVERY 4 HOURS PRN
Status: ACTIVE | OUTPATIENT
Start: 2025-05-08

## 2025-05-08 RX ORDER — AMOXICILLIN 250 MG
1 CAPSULE ORAL 2 TIMES DAILY PRN
Status: ACTIVE | OUTPATIENT
Start: 2025-05-08

## 2025-05-08 RX ORDER — LIDOCAINE 40 MG/G
CREAM TOPICAL
Status: ACTIVE | OUTPATIENT
Start: 2025-05-08

## 2025-05-08 RX ORDER — POLYETHYLENE GLYCOL 3350 17 G/17G
17 POWDER, FOR SOLUTION ORAL 2 TIMES DAILY PRN
Status: ACTIVE | OUTPATIENT
Start: 2025-05-08

## 2025-05-08 RX ORDER — ACETAMINOPHEN 500 MG
1000 TABLET ORAL EVERY 6 HOURS PRN
Status: ON HOLD | COMMUNITY

## 2025-05-08 RX ORDER — ACETAMINOPHEN 325 MG/1
650 TABLET ORAL EVERY 4 HOURS PRN
Status: DISCONTINUED | OUTPATIENT
Start: 2025-05-08 | End: 2025-05-08

## 2025-05-08 RX ORDER — ONDANSETRON 4 MG/1
4 TABLET, ORALLY DISINTEGRATING ORAL EVERY 6 HOURS PRN
Status: ACTIVE | OUTPATIENT
Start: 2025-05-08

## 2025-05-08 RX ORDER — LOSARTAN POTASSIUM 50 MG/1
50 TABLET ORAL EVERY EVENING
Status: DISPENSED | OUTPATIENT
Start: 2025-05-08

## 2025-05-08 RX ORDER — CARVEDILOL 6.25 MG/1
6.25 TABLET ORAL 2 TIMES DAILY WITH MEALS
Status: DISPENSED | OUTPATIENT
Start: 2025-05-08

## 2025-05-08 RX ORDER — OXYMETAZOLINE HYDROCHLORIDE 0.05 G/100ML
2 SPRAY NASAL 2 TIMES DAILY
Status: DISPENSED | OUTPATIENT
Start: 2025-05-08

## 2025-05-08 RX ORDER — CEFAZOLIN SODIUM 2 G/50ML
2 SOLUTION INTRAVENOUS ONCE
Status: COMPLETED | OUTPATIENT
Start: 2025-05-08 | End: 2025-05-08

## 2025-05-08 RX ORDER — IBUPROFEN 200 MG
800 TABLET ORAL EVERY 6 HOURS PRN
Status: ON HOLD | COMMUNITY

## 2025-05-08 RX ORDER — SODIUM CHLORIDE, SODIUM LACTATE, POTASSIUM CHLORIDE, CALCIUM CHLORIDE 600; 310; 30; 20 MG/100ML; MG/100ML; MG/100ML; MG/100ML
INJECTION, SOLUTION INTRAVENOUS CONTINUOUS
Status: ACTIVE | OUTPATIENT
Start: 2025-05-08

## 2025-05-08 RX ORDER — NICOTINE POLACRILEX 4 MG
15-30 LOZENGE BUCCAL
Status: ACTIVE | OUTPATIENT
Start: 2025-05-08

## 2025-05-08 RX ORDER — DEXTROSE MONOHYDRATE 25 G/50ML
25-50 INJECTION, SOLUTION INTRAVENOUS
Status: ACTIVE | OUTPATIENT
Start: 2025-05-08

## 2025-05-08 RX ADMIN — SODIUM CHLORIDE, SODIUM LACTATE, POTASSIUM CHLORIDE, AND CALCIUM CHLORIDE: .6; .31; .03; .02 INJECTION, SOLUTION INTRAVENOUS at 16:57

## 2025-05-08 RX ADMIN — INSULIN GLARGINE 20 UNITS: 100 INJECTION, SOLUTION SUBCUTANEOUS at 22:23

## 2025-05-08 RX ADMIN — CARVEDILOL 6.25 MG: 6.25 TABLET, FILM COATED ORAL at 22:21

## 2025-05-08 RX ADMIN — CEFTRIAXONE 1 G: 1 INJECTION, POWDER, FOR SOLUTION INTRAMUSCULAR; INTRAVENOUS at 16:18

## 2025-05-08 RX ADMIN — CEFAZOLIN SODIUM 2 G: 2 SOLUTION INTRAVENOUS at 13:56

## 2025-05-08 ASSESSMENT — ACTIVITIES OF DAILY LIVING (ADL)
ADLS_ACUITY_SCORE: 46
ADLS_ACUITY_SCORE: 23
ADLS_ACUITY_SCORE: 46
ADLS_ACUITY_SCORE: 23
ADLS_ACUITY_SCORE: 23

## 2025-05-08 ASSESSMENT — COLUMBIA-SUICIDE SEVERITY RATING SCALE - C-SSRS
1. IN THE PAST MONTH, HAVE YOU WISHED YOU WERE DEAD OR WISHED YOU COULD GO TO SLEEP AND NOT WAKE UP?: NO
6. HAVE YOU EVER DONE ANYTHING, STARTED TO DO ANYTHING, OR PREPARED TO DO ANYTHING TO END YOUR LIFE?: NO
2. HAVE YOU ACTUALLY HAD ANY THOUGHTS OF KILLING YOURSELF IN THE PAST MONTH?: NO

## 2025-05-08 NOTE — ED PROVIDER NOTES
I did follow-up on patient's chart and note positive blood culture results.  Patient had been contacted by my colleague, Dr. Ag (see associated note).  I also attempted to reach out to patient at 9:45 am today, and left a voicemail for patient to return call to the ER.  I also attempted to reach his significant other with the number provided, though no answer.  I notified our ER charge nurse that patient may return call to the ER and patient should be directed to return to the ER for re-assessment.       Lux Yung MD  05/08/25 0136    I did attempt to contact the patient again by phone at 11:10 am.  He did answer.  He reports he is feeling well.  We discussed in detail what the positive blood cultures mean, and did again recommend he return to the ER for re-assessment.  He verbalized understanding of this and plans to return to the ER later today for re-assessment.       Lux Yung MD  05/08/25 5429

## 2025-05-08 NOTE — ED NOTES
"[facility]  ED Nurse Handoff Report    ED Chief complaint: Abnormal Labs  . ED Diagnosis:   Final diagnoses:   Pyelonephritis   Positive blood culture   Type 2 diabetes mellitus without complication, without long-term current use of insulin (H)       Allergies: No Known Allergies    Code Status: Full Code    Activity level - Baseline/Home:  independent.  Activity Level - Current:   standby.   Lift room needed: No.   Bariatric: No   Needed: No   Isolation: No.   Infection: Not Applicable.     Respiratory status: Room air    Vital Signs (within 30 minutes):   Vitals:    05/08/25 1209 05/08/25 1355 05/08/25 1400   BP: (!) 136/101 (!) 127/91 117/89   Pulse: 82 82 84   Resp: 18     Temp: 97.3  F (36.3  C)     TempSrc: Temporal     SpO2: 98% 99% 96%   Weight: 103.5 kg (228 lb 2.8 oz)     Height: 1.93 m (6' 4\")         Cardiac Rhythm:  ,      Pain level:    Patient confused: No.   Patient Falls Risk: nonskid shoes/slippers when out of bed.   Elimination Status: yet to void      Patient Report - Initial Complaint: Deni Coates is a 40 year old male who has a history of type 2 diabetes previous stroke was recently seen on 5/6/2025 he complained of dizziness chills fatigue and was diagnosed with a UTI.  During that visit his sodium was slightly low his ketones were slightly high he was presumed to have a UTI and sent home.  The patient said that that evening on the sixth he felt warm since that time he has had fatigue he has had some shaking and chills has felt warm to his segment other denies feeling warm to himself however.  He had complained of back pain which is since resolved he has no abdominal pain or sore throat no recent dental work.  He was called last 2 days with positive blood cultures to return for Staph aureus.  The patient says that his dizziness has resolved.  Patient reports that he is also had some myalgias.   .   Focused Assessment: Was seen in ED 5/6 for flank pain, " dysuria, body aches. Diagnosed with pyelonephritis, discharged home on ABX. Received a call daily for the last 2 days to return to ED due to +Blood culture results. Pt reports overall he is feeling better. Reports tactile fever two nights ago.      Abnormal Results:   Labs Ordered and Resulted from Time of ED Arrival to Time of ED Departure   COMPREHENSIVE METABOLIC PANEL - Abnormal       Result Value    Sodium 132 (*)     Potassium 4.1      Carbon Dioxide (CO2) 20 (*)     Anion Gap 16 (*)     Urea Nitrogen 16.8      Creatinine 0.55 (*)     GFR Estimate >90      Calcium 8.9      Chloride 96 (*)     Glucose 351 (*)     Alkaline Phosphatase 128      AST 14      ALT 20      Protein Total 7.2      Albumin 3.5      Bilirubin Total 0.3     KETONES URINE - Abnormal    Ketones Urine 100 (*)    ISTAT GASES LACTATE VENOUS POCT - Abnormal    Lactic Acid POCT 0.6 (*)     Bicarbonate Venous POCT 19 (*)     O2 Sat, Venous POCT 88 (*)     pCO2 Venous POCT 33 (*)     pH Venous POCT 7.37      pO2 Venous POCT 55 (*)    INFLUENZA A/B, RSV AND SARS-COV2 PCR - Normal    Influenza A PCR Negative      Influenza B PCR Negative      RSV PCR Negative      SARS CoV2 PCR Negative     GROUP A STREPTOCOCCUS PCR THROAT SWAB - Normal    Group A strep by PCR Not Detected     CBC WITH PLATELETS AND DIFFERENTIAL    WBC Count 9.7      RBC Count 4.80      Hemoglobin 14.2      Hematocrit 41.0      MCV 85      MCH 29.6      MCHC 34.6      RDW 11.9      Platelet Count 349      % Neutrophils 74      % Lymphocytes 16      % Monocytes 9      % Eosinophils 1      % Basophils 0      % Immature Granulocytes 0      NRBCs per 100 WBC 0      Absolute Neutrophils 7.1      Absolute Lymphocytes 1.5      Absolute Monocytes 0.9      Absolute Eosinophils 0.1      Absolute Basophils 0.0      Absolute Immature Granulocytes 0.0      Absolute NRBCs 0.0     BLOOD CULTURE   BLOOD CULTURE        No orders to display       Treatments provided: SEE MAR/notes   Family Comments:  N/A  OBS brochure/video discussed/provided to patient:  No  ED Medications:   Medications   sodium chloride (PF) 0.9% PF flush 3 mL (has no administration in time range)   sodium chloride (PF) 0.9% PF flush 3 mL (has no administration in time range)   ceFAZolin (ANCEF) 2 g in dextrose 50 mL intermittent infusion (2 g Intravenous $New Bag 5/8/25 4201)       Drips infusing:  No  For the majority of the shift this patient was Green.   Interventions performed were N/A.    Sepsis treatment initiated: No    Cares/treatment/interventions/medications to be completed following ED care: N/A    ED Nurse Name: Negro Tai RN  2:14 PM    RECEIVING UNIT ED HANDOFF REVIEW    Above ED Nurse Handoff Report was reviewed: Yes  Reviewed by: Leena Ureña RN on May 8, 2025 at 4:23 PM   DUNCAN Gutierrez called the ED to inform them the note was read: Yes     RECEIVING UNIT ED HANDOFF REVIEW    Above ED Nurse Handoff Report was reviewed: Yes  Reviewed by: Sary Najera RN on May 8, 2025 at 5:31 PM   DUNCAN Gutierrez called the ED to inform them the note was read: Yes

## 2025-05-08 NOTE — ED TRIAGE NOTES
Was seen in ED 5/6 for flank pain, dysuria, body aches. Diagnosed with pyelonephritis, discharged home on ABX. Received a call daily for the last 2 days to return to ED due to +Blood culture results. Pt reports overall he is feeling better. Reports tactile fever two nights ago.      Triage Assessment (Adult)       Row Name 05/08/25 1209          Triage Assessment    Airway WDL WDL        Respiratory WDL    Respiratory WDL WDL        Skin Circulation/Temperature WDL    Skin Circulation/Temperature WDL WDL        Cardiac WDL    Cardiac WDL WDL        Peripheral/Neurovascular WDL    Peripheral Neurovascular WDL WDL        Cognitive/Neuro/Behavioral WDL    Cognitive/Neuro/Behavioral WDL WDL

## 2025-05-08 NOTE — H&P
[facility]    History and Physical - Hospitalist Service       Date of Admission:  5/8/2025  Primary Care Physician   Miles Case  CONSULTANTS: ID     Assessment & Plan      Deni Coates is a 40 year old male who has a complex medical history including insulin-dependent type 2 diabetes with polyneuropathy, hypertension, anxiety depression, hyperlipidemia, previous osteomyelitis of the left toe, and previous stroke who is presenting with MSSA bacteremia.  As the story goes, the patient presented to the ER 5/6/2024 with fatigue, dizziness, flank tenderness, myalgias, dysuria, and fever.  He was found to be dehydrated with an elevated blood sugar and hyponatremia.  He was diagnosed with likely pyelonephritis and was sent home with doxycycline and Vantin to cover for both sexually-transmitted diseases as well as pyelonephritis.  His urine culture at that time was positive for MSSA and subsequently his blood cultures turn positive as well.  Patient was told to come back to the hospital due to the positive blood cultures    Today, patient is actually feeling much better than he did a couple days ago.  He did have some aches last night but otherwise his flank pain has resolved he has no fevers, denies any chills.  His dizziness has improved as well.  Patient's blood sugars still are uncontrolled.  In the emergency room he was found to have a white blood cell count of 9.7 which is improved from 13.3 a couple days ago.  His sodium is up to 132 from 126 a couple days ago.  His BUN 16, creatinine 0.55, he does have ketones in his urine still and his sugar is still elevated at 351.  His lactic acid is normal at 0.6 and his VBG was normal as well.  Patient was started on Ancef in the emergency room and is being admitted to the hospital.        MSSA bacteremia, pyelonephritis due to MSSA  Patient originally presented 5/6/2025 to the emergency room was diagnosed with pyelonephritis with an  abnormal urinalysis and a culture came back as Staph aureus.  Subsequently his blood cultures 2/2 grew out MSSA as well.  He initially was sent home on Vantin/doxycycline and at this point would be placed on IV antibiotics.  Initially was placed on Ancef in the emergency room but I will switch him to Rocephin for once a day dosing.  Infectious disease will need to be consulted.  At this point we do not have a source I will be checking an echocardiogram.  There is no obvious murmurs however on exam to suggest endocarditis.  Patient does have a history of osteomyelitis of his toe in 2022.  I am not sure what his source is at this point.  May need to consider white blood cell tagged scan.  Repeat blood cultures been sent in the emergency room and if remain negative likely will need to have PICC line placed for ongoing IV antibiotics at home.  Will await infectious diseases input.  Patient denies any IV drug use.  Likely the patient is already feeling better from 2 days ago his white blood cell count is down to 9.7 from 13.3 and does not appear to be septic.     Dehydration, ketosis   Patient is quite dehydrated a couple days ago and he continues to have ketones in his urine.  Patient will be given IV fluids.    Type 2 insulin dependent diabetes   Patient's blood sugars have been out of control since having his infection.  He presented with a blood sugar over 300.  Patient has a hemoglobin A1c of 11.6 as of 6/27/2024.  I suspect his blood sugar's have been even higher since then with his infection.  Patient to continue on Lantus and continues on insulin correction scale.  Not sure why given the patient's age she is not taking prandial insulin or carb counting.    Hyponatremia   Patient had a sodium down to 126 a couple days ago and its up to 132.  Will continue patient IV fluids and recheck in the morning.    Essential hypertension    Patient will continue on his home Coreg and Cozaar    Anxiety/depression   Patient  "continues on his home Prozac    Hyperlipidemia   Patient continues on Lipitor    History osteomyelitis   In 12/2022 the patient had a left toe osteomyelitis.  I am unable to find any records or culture showing how he was treated or what his cultures grew out.    History ischemic stroke  Patient continues on aspirin, statin, and a beta-blocker    Tobacco abuse  Patient continues to smoke at home.  Has not smoked for a few days he said does not need nicotine replacement.  Cessation was discussed at length with the patient.  Given his age and history of stroke he needs to be off of all nicotine.    Discussed plan of care with Dr Javier in the emergency room   Diet: Moderate Consistent Carb (60 g CHO per Meal) Diet    DVT Prophylaxis: Pneumatic Compression Devices  Márquez Catheter: Not present  Lines: None     Cardiac Monitoring: None    RESTRAINTS: not indicated  Code Status: Full Code           This document was created using voice recognition technology.  Please excuse any typographical errors that may have occurred.  Please call with any questions.            Clinically Significant Risk Factors Present on Admission         # Hyponatremia: Lowest Na = 132 mmol/L in last 2 days, will monitor as appropriate  # Hypochloremia: Lowest Cl = 96 mmol/L in last 2 days, will monitor as appropriate        # Drug Induced Platelet Defect: home medication list includes an antiplatelet medication   # Hypertension: Noted on problem list           # Overweight: Estimated body mass index is 27.77 kg/m  as calculated from the following:    Height as of this encounter: 1.93 m (6' 4\").    Weight as of this encounter: 103.5 kg (228 lb 2.8 oz).       # Financial/Environmental Concerns:           Disposition Plan      Expected Discharge Date: 05/10/2025                  Pedro Bassett MD  Hospitalist Service  [facility]  Securely message with [email or web address] (more info)  Text page via [email or web address] Paging/Directory     Medically " Ready for Discharge: Anticipated in 2-4 Days      Length of stay: Anticipate greater than 2 midnight hospitalization for evaluation and treatment of MSSA bacteremia          ______________________________________________________________________    Chief Complaint   MSSA bacteremia    History is obtained from the patient  EPIC reviewed    History of Present Illness   Deni Coates is a 40 year old male who has a complex medical history including insulin-dependent type 2 diabetes with polyneuropathy, hypertension, anxiety depression, hyperlipidemia, previous osteomyelitis of the left toe, and previous stroke who is presenting with MSSA bacteremia.  As the story goes, the patient presented to the ER 5/6/2024 with fatigue, dizziness, flank tenderness, myalgias, dysuria, and fever.  He was found to be dehydrated with an elevated blood sugar and hyponatremia.  He was diagnosed with likely pyelonephritis and was sent home with doxycycline and Vantin to cover for both sexually-transmitted diseases as well as pyelonephritis.  His urine culture at that time was positive for MSSA and subsequently his blood cultures turn positive as well.  Patient was told to come back to the hospital due to the positive blood cultures    Today, patient is actually feeling much better than he did a couple days ago.  He did have some aches last night but otherwise his flank pain has resolved he has no fevers, denies any chills.  His dizziness has improved as well.  Patient's blood sugars still are uncontrolled.  In the emergency room he was found to have a white blood cell count of 9.7 which is improved from 13.3 a couple days ago.  His sodium is up to 132 from 126 a couple days ago.  His BUN 16, creatinine 0.55, he does have ketones in his urine still and his sugar is still elevated at 351.  His lactic acid is normal at 0.6 and his VBG was normal as well.  Patient was started on Ancef in the emergency room and is being admitted to the  [facility].      Past Medical History    Past Medical History:   Diagnosis Date    Anxiety     Diabetes mellitus (H)     Hypertension     Osteomyelitis of toe of left foot (H) 12/15/2022       Past Surgical History   No past surgical history on file.    Prior to Admission Medications  pharm D to reconcile  Prior to Admission Medications   Prescriptions Last Dose Informant Patient Reported? Taking?   Continuous Glucose Sensor (FREESTYLE ESTEFANY 3 SENSOR) [facility]   No No   Si each every 14 days. Use 1 sensor every 14 days. Use to read blood sugars per 's instructions.   FLUoxetine (PROZAC) 40 MG capsule   No No   Sig: Take 1 capsule (40 mg) by mouth daily   aspirin (ASA) 325 MG EC tablet   No No   Sig: TAKE 1 TABLET BY MOUTH ONCE DAILY   atorvastatin (LIPITOR) 40 MG tablet   No No   Sig: TAKE 1 TABLET BY MOUTH ONCE DAILY   blood glucose (NO BRAND SPECIFIED) test strip   No No   Sig: Use to test blood sugar 4 times daily or as directed.   blood glucose monitoring (NO BRAND SPECIFIED) meter device kit   No No   Sig: Use to test blood sugar 4 times daily or as directed.   carvedilol (COREG) 6.25 MG tablet   No No   Sig: TAKE 1 TABLET BY MOUTH TWO TIMES A DAY WITH MEALS   cefpodoxime (VANTIN) 200 MG tablet   No No   Sig: Take 1 tablet (200 mg) by mouth 2 times daily for 10 days.   doxycycline hyclate (VIBRAMYCIN) 100 MG capsule   No No   Sig: Take 1 capsule (100 mg) by mouth 2 times daily for 7 days.   ibuprofen (ADVIL/MOTRIN) 800 MG tablet   Yes No   Sig: Take 800 mg by mouth.   insulin aspart (NOVOLOG FLEXPEN) 100 UNIT/ML pen   No No   Sig: INJECT 12-15 UNITS SUBCUTANEOUSLY THREE TIMES A DAY WITH MEALS   insulin glargine (LANTUS SOLOSTAR) 100 UNIT/ML pen   No No   Sig: INJECT 20 UNITS SUBCUTANEOUSLY EVERY MORNING. INCREASE PER TITRATION (MAX DOSE 60 UNITS PER DAY)   insulin pen needle (31G X 8 MM) 31G X 8 MM miscellaneous   No No   Sig: Use 4 pen needles daily or as directed.   losartan (COZAAR) 50 MG tablet    No No   Sig: TAKE 1 TABLET BY MOUTH ONCE DAILY   thin (NO BRAND SPECIFIED) lancets   No No   Sig: Use with lanceting device.      Facility-Administered Medications: None        Allergies   No Known Allergies     REVIEW OF SYSTEMS:  A comprehensive review of systems was negative except for items noted in the HPI.  Patient currently denies any fever, chills, sweats, nausea, vomiting, diarrhea, shortness of breath, or chest pain.         Physical Exam   Vital Signs: Temp: 97.3  F (36.3  C) Temp src: Temporal BP: (!) 124/91 Pulse: 76   Resp: 18 SpO2: 98 %      Weight: 228 lbs 2.82 oz    General appearance: Patient is alert and oriented x3, no apparent distress, pleasant and conversing normally, speaking in full sentences, appears stated age, sitting in bed  HEENT:   Mucous membranes are moist  NECK:  supple without bruit or lymphadenopathy  RESPIRATORY: Clear to auscultation bilateral, good air movement  Back no flank tenderness to thumping or palpipations  CARDIOVASCULAR: Regular rate and rhythm, normal S1/S2, no murmurs, rubs, or gallops present, peripheral pulses intact  GASTROINTESTINAL: Non-distended, non-tender, soft, bowel sounds present throughou  NEUROLOGIC:  Cranial nerves II-XII intact, without any focal deficits, strength 5/5 throughout  EXTREMITIES:  Moves all extremities, no clubbing, cyanosis, nor edema  :  Foleynot present         Data     I have personally reviewed the following data over the past 24 hrs:    9.7  \   14.2   / 349     132 (L) 96 (L) 16.8 /  351 (H)   4.1 20 (L) 0.55 (L) \     ALT: 20 AST: 14 AP: 128 TBILI: 0.3   ALB: 3.5 TOT PROTEIN: 7.2 LIPASE: N/A     Procal: N/A CRP: N/A Lactic Acid: 0.6 (L)         Imaging:   Results for orders placed or performed during the hospital encounter of 06/27/24   Chest XR,  PA & LAT    Narrative    EXAM: XR CHEST 2 VIEWS  LOCATION: [facility]  DATE: 6/27/2024    INDICATION: Chest pain.   COMPARISON: Chest radiograph 9/19/2015.        Impression    IMPRESSION: Negative chest.     Procedures: echo pending  I have personally have reviewed the patient's most up to date radiologic exams,  labs, orders, and medications myself

## 2025-05-08 NOTE — ED PROVIDER NOTES
Emergency Department Note      History of Present Illness     Chief Complaint   Abnormal Labs      HPI   Deni Coates is a 40 year old male who has a history of type 2 diabetes previous stroke was recently seen on 5/6/2025 he complained of dizziness chills fatigue and was diagnosed with a UTI.  During that visit his sodium was slightly low his ketones were slightly high he was presumed to have a UTI and sent home.  The patient said that that evening on the sixth he felt warm since that time he has had fatigue he has had some shaking and chills has felt warm to his segment other denies feeling warm to himself however.  He had complained of back pain which is since resolved he has no abdominal pain or sore throat no recent dental work.  He was called last 2 days with positive blood cultures to return for Staph aureus.  The patient says that his dizziness has resolved.  Patient reports that he is also had some myalgias.    Independent Historian   wife    Review of External Notes   Blood cultures and previous labs from 5/6/2025 visit reviewed as well as the note from 5/6/2025    Past Medical History     Medical History and Problem List   Past Medical History:   Diagnosis Date    Anxiety     Diabetes mellitus (H)     Hypertension     Osteomyelitis of toe of left foot (H) 12/15/2022       Medications   acetaminophen (TYLENOL) 500 MG tablet  aspirin (ASA) 325 MG EC tablet  atorvastatin (LIPITOR) 40 MG tablet  carvedilol (COREG) 6.25 MG tablet  cefpodoxime (VANTIN) 200 MG tablet  FLUoxetine (PROZAC) 40 MG capsule  ibuprofen (ADVIL/MOTRIN) 200 MG tablet  insulin aspart (NOVOLOG FLEXPEN) 100 UNIT/ML pen  insulin glargine (LANTUS PEN) 100 UNIT/ML pen  losartan (COZAAR) 50 MG tablet  blood glucose (NO BRAND SPECIFIED) test strip  blood glucose monitoring (NO BRAND SPECIFIED) meter device kit  Continuous Glucose Sensor (FREESTYLE ESTEFANY 3 SENSOR) [facility] [place]  insulin pen needle (31G X 8 MM) 31G X 8 MM miscellaneous  thin (NO  "BRAND SPECIFIED) lancets        Surgical History   No past surgical history on file.    Physical Exam     Patient Vitals for the past 24 hrs:   BP Temp Temp src Pulse Resp SpO2 Height Weight   05/08/25 1430 (!) 124/91 -- -- 76 -- 98 % -- --   05/08/25 1400 117/89 -- -- 84 -- 96 % -- --   05/08/25 1355 (!) 127/91 -- -- 82 -- 99 % -- --   05/08/25 1209 (!) 136/101 97.3  F (36.3  C) Temporal 82 18 98 % 1.93 m (6' 4\") 103.5 kg (228 lb 2.8 oz)     Physical Exam  General: The patient is alert, in no respiratory distress.    HENT: Mucous membranes moist.  Slight erythema to his throat    Cardiovascular: Regular rate and rhythm. Good pulses in all four extremities. Normal capillary refill and skin turgor.     Respiratory: Lungs are clear. No nasal flaring. No retractions. No wheezing, no crackles.    Gastrointestinal: Abdomen soft. No tenderness to palpation    Musculoskeletal: No gross deformity.  No flank tenderness    Skin: No rashes or petechiae.     Neurologic: The patient is alert and oriented.  Neurologically intact    Lymphatic:  No lower extremity swelling.    Psychiatric: The patient is non-tearful.     Diagnostics     Lab Results   Labs Ordered and Resulted from Time of ED Arrival to Time of ED Departure   COMPREHENSIVE METABOLIC PANEL - Abnormal       Result Value    Sodium 132 (*)     Potassium 4.1      Carbon Dioxide (CO2) 20 (*)     Anion Gap 16 (*)     Urea Nitrogen 16.8      Creatinine 0.55 (*)     GFR Estimate >90      Calcium 8.9      Chloride 96 (*)     Glucose 351 (*)     Alkaline Phosphatase 128      AST 14      ALT 20      Protein Total 7.2      Albumin 3.5      Bilirubin Total 0.3     KETONES URINE - Abnormal    Ketones Urine 100 (*)    ISTAT GASES LACTATE VENOUS POCT - Abnormal    Lactic Acid POCT 0.6 (*)     Bicarbonate Venous POCT 19 (*)     O2 Sat, Venous POCT 88 (*)     pCO2 Venous POCT 33 (*)     pH Venous POCT 7.37      pO2 Venous POCT 55 (*)    INFLUENZA A/B, RSV AND SARS-COV2 PCR - Normal    " Influenza A PCR Negative      Influenza B PCR Negative      RSV PCR Negative      SARS CoV2 PCR Negative     GROUP A STREPTOCOCCUS PCR THROAT SWAB - Normal    Group A strep by PCR Not Detected     CBC WITH PLATELETS AND DIFFERENTIAL    WBC Count 9.7      RBC Count 4.80      Hemoglobin 14.2      Hematocrit 41.0      MCV 85      MCH 29.6      MCHC 34.6      RDW 11.9      Platelet Count 349      % Neutrophils 74      % Lymphocytes 16      % Monocytes 9      % Eosinophils 1      % Basophils 0      % Immature Granulocytes 0      NRBCs per 100 WBC 0      Absolute Neutrophils 7.1      Absolute Lymphocytes 1.5      Absolute Monocytes 0.9      Absolute Eosinophils 0.1      Absolute Basophils 0.0      Absolute Immature Granulocytes 0.0      Absolute NRBCs 0.0     GLUCOSE MONITOR NURSING POCT   GLUCOSE MONITOR NURSING POCT   GLUCOSE MONITOR NURSING POCT   GLUCOSE MONITOR NURSING POCT   BLOOD CULTURE   BLOOD CULTURE       Imaging   Echocardiogram Complete    (Results Pending)           ED Course      Medications Administered   Medications   sodium chloride (PF) 0.9% PF flush 3 mL (has no administration in time range)   sodium chloride (PF) 0.9% PF flush 3 mL (has no administration in time range)   aspirin (ASA) EC tablet 325 mg (has no administration in time range)   atorvastatin (LIPITOR) tablet 40 mg (has no administration in time range)   carvedilol (COREG) tablet 6.25 mg (has no administration in time range)   insulin glargine (LANTUS PEN) injection 20 Units (has no administration in time range)   losartan (COZAAR) tablet 50 mg (has no administration in time range)   lidocaine 1 % 0.1-1 mL (has no administration in time range)   lidocaine (LMX4) cream (has no administration in time range)   sodium chloride (PF) 0.9% PF flush 3 mL (has no administration in time range)   sodium chloride (PF) 0.9% PF flush 3 mL (has no administration in time range)   senna-docusate (SENOKOT-S/PERICOLACE) 8.6-50 MG per tablet 1 tablet (has no  administration in time range)     Or   senna-docusate (SENOKOT-S/PERICOLACE) 8.6-50 MG per tablet 2 tablet (has no administration in time range)   calcium carbonate (TUMS) chewable tablet 1,000 mg (has no administration in time range)   acetaminophen (TYLENOL) tablet 650 mg (has no administration in time range)     Or   acetaminophen (TYLENOL) Suppository 650 mg (has no administration in time range)   polyethylene glycol (MIRALAX) Packet 17 g (has no administration in time range)   ondansetron (ZOFRAN ODT) ODT tab 4 mg (has no administration in time range)     Or   ondansetron (ZOFRAN) injection 4 mg (has no administration in time range)   glucose gel 15-30 g (has no administration in time range)     Or   dextrose 50 % injection 25-50 mL (has no administration in time range)     Or   glucagon injection 1 mg (has no administration in time range)   cefTRIAXone (ROCEPHIN) 1 g vial to attach to  mL bag for ADULTS or NS 50 mL bag for PEDS (has no administration in time range)   lactated ringers infusion (has no administration in time range)   insulin aspart (NovoLOG) injection (RAPID ACTING) (has no administration in time range)   insulin aspart (NovoLOG) injection (RAPID ACTING) (has no administration in time range)   ceFAZolin (ANCEF) 2 g in dextrose 50 mL intermittent infusion (0 g Intravenous Stopped 5/8/25 1451)       Procedures   Procedures     Discussion of Management   Discussed the antibiotic dosing with the ED pharmacist who recommends 2 g versus 1 g    I discussed the case with Dr EDWARDS accepts the patient for admission    ED Course   ED Course as of 05/08/25 1531   Thu May 08, 2025   1356 I spoke with Dr. Edwards from the hospitalist service regarding the patient's presentation, findings here in the ED, and plan of care.             Medical Decision Making / Diagnosis         MDM   Deni Coates is a 40 year old male reports that some of his symptoms from his presumed UTI which grew Staph aureus  have resolved including his flank pain and dizziness however is concerning he still having some chills feels warm to his wife and is complaining of myalgias.  With the staff aureus bacteremia I was concerned that this could be a more significant process I did consider alternative sources I would consider the possibly of something such as discitis or intra-abdominal her is no abdominal tenderness and the back pain is has resolved he has no tenderness to percussion across his back.  Antibiotics and labs were ordered I felt that he should stay in the hospital with continued symptoms.  I did consider that there could be other sources for the infection such as renal abscess diverticulitis other intra-abdominal or thoracic processes.  Discitis was considered however I cannot explain why this patient's symptoms are better for pain he has no tenderness.  I discussed the case with the hospitalist will admit the patient with bacteremia antibiotics were started and the patient was mated for further workup.    Critical care time 30 minutes in exclusion of any procedures due to bacteremia    Disposition   The patient was admitted to the hospital.     Diagnosis     ICD-10-CM    1. Pyelonephritis  N12       2. Positive blood culture  R78.81       3. Type 2 diabetes mellitus without complication, without long-term current use of insulin (H)  E11.9            Discharge Medications   New Prescriptions    No medications on file                Rikki Javier MD  05/08/25 9305

## 2025-05-08 NOTE — PHARMACY-ADMISSION MEDICATION HISTORY
Pharmacist Admission Medication History    Admission medication history is complete. The information provided in this note is only as accurate as the sources available at the time of the update.    Information Source(s): Patient, Hospital records, and CareEverywhere/SureScripts via in-person    Pertinent Information: pt was prescribed doxycycline 100mg bid x 7 days on 5/6, but then told by MD not to take due to negative test.  Removed from PTA med list    Changes made to PTA medication list:  Added: tyleonl, prn fluoxetine, ibuprofen 200mg, novolog 10-15 units w/meals, lantus 15 qhs  Deleted: lantus 20 units qam, novolog 12-15 units with meals, ibuprofen 800mg, fluoxetin 40mg daily  Changed: None    For patients on insulin therapy:  Do you use sliding scale insulin based on blood sugars? No  Do you typically eat three meals a day? No-2 per day  How many times do you check your blood glucose per day? couple  How many episodes of hypoglycemia do you typically have per month? none    Allergies reviewed with patient and updates made in EHR: yes    Medication History Completed By: Noemí Leal [facility] 5/8/2025 2:40 PM    PTA Med List   Medication Sig Note Last Dose/Taking    acetaminophen (TYLENOL) 500 MG tablet Take 1,000 mg by mouth every 6 hours as needed for mild pain.  Taking As Needed    aspirin (ASA) 325 MG EC tablet TAKE 1 TABLET BY MOUTH ONCE DAILY  5/8/2025 Morning    atorvastatin (LIPITOR) 40 MG tablet TAKE 1 TABLET BY MOUTH ONCE DAILY  5/8/2025 Morning    carvedilol (COREG) 6.25 MG tablet TAKE 1 TABLET BY MOUTH TWO TIMES A DAY WITH MEALS  5/8/2025 Morning    cefpodoxime (VANTIN) 200 MG tablet Take 1 tablet (200 mg) by mouth 2 times daily for 10 days. 5/8/2025: 10 day course prescribed 5/6/25 5/8/2025 Morning    FLUoxetine (PROZAC) 40 MG capsule Take 40 mg by mouth daily as needed.  Unknown    ibuprofen (ADVIL/MOTRIN) 200 MG tablet Take 800 mg by mouth every 6 hours as needed for pain.  5/7/2025    insulin  aspart (NOVOLOG FLEXPEN) 100 UNIT/ML pen Inject 10-15 Units subcutaneously.  5/8/2025 Morning    insulin glargine (LANTUS PEN) 100 UNIT/ML pen Inject 15 Units subcutaneously at bedtime.  5/7/2025 Evening    losartan (COZAAR) 50 MG tablet TAKE 1 TABLET BY MOUTH ONCE DAILY  5/6/2025 Evening

## 2025-05-09 ENCOUNTER — APPOINTMENT (OUTPATIENT)
Dept: CT IMAGING | Facility: CLINIC | Age: 40
End: 2025-05-09
Attending: SPECIALIST
Payer: MEDICAID

## 2025-05-09 LAB
ANION GAP SERPL CALCULATED.3IONS-SCNC: 12 MMOL/L (ref 7–15)
BACTERIA BLD CULT: ABNORMAL
BUN SERPL-MCNC: 12.8 MG/DL (ref 6–20)
CALCIUM SERPL-MCNC: 8.1 MG/DL (ref 8.8–10.4)
CHLORIDE SERPL-SCNC: 101 MMOL/L (ref 98–107)
CREAT SERPL-MCNC: 0.53 MG/DL (ref 0.67–1.17)
EGFRCR SERPLBLD CKD-EPI 2021: >90 ML/MIN/1.73M2
ERYTHROCYTE [DISTWIDTH] IN BLOOD BY AUTOMATED COUNT: 11.9 % (ref 10–15)
EST. AVERAGE GLUCOSE BLD GHB EST-MCNC: 318 MG/DL
GLUCOSE BLDC GLUCOMTR-MCNC: 268 MG/DL (ref 70–99)
GLUCOSE BLDC GLUCOMTR-MCNC: 272 MG/DL (ref 70–99)
GLUCOSE BLDC GLUCOMTR-MCNC: 288 MG/DL (ref 70–99)
GLUCOSE BLDC GLUCOMTR-MCNC: 298 MG/DL (ref 70–99)
GLUCOSE BLDC GLUCOMTR-MCNC: 343 MG/DL (ref 70–99)
GLUCOSE SERPL-MCNC: 343 MG/DL (ref 70–99)
HBA1C MFR BLD: 12.7 %
HCO3 SERPL-SCNC: 20 MMOL/L (ref 22–29)
HCT VFR BLD AUTO: 38.3 % (ref 40–53)
HGB BLD-MCNC: 13.2 G/DL (ref 13.3–17.7)
MCH RBC QN AUTO: 29.6 PG (ref 26.5–33)
MCHC RBC AUTO-ENTMCNC: 34.5 G/DL (ref 31.5–36.5)
MCV RBC AUTO: 86 FL (ref 78–100)
PLATELET # BLD AUTO: 350 10E3/UL (ref 150–450)
POTASSIUM SERPL-SCNC: 3.9 MMOL/L (ref 3.4–5.3)
RBC # BLD AUTO: 4.46 10E6/UL (ref 4.4–5.9)
SODIUM SERPL-SCNC: 133 MMOL/L (ref 135–145)
WBC # BLD AUTO: 7.8 10E3/UL (ref 4–11)

## 2025-05-09 PROCEDURE — 250N000011 HC RX IP 250 OP 636: Performed by: SPECIALIST

## 2025-05-09 PROCEDURE — 82565 ASSAY OF CREATININE: CPT | Performed by: INTERNAL MEDICINE

## 2025-05-09 PROCEDURE — 99232 SBSQ HOSP IP/OBS MODERATE 35: CPT | Performed by: INTERNAL MEDICINE

## 2025-05-09 PROCEDURE — 250N000009 HC RX 250: Performed by: SPECIALIST

## 2025-05-09 PROCEDURE — 83036 HEMOGLOBIN GLYCOSYLATED A1C: CPT | Performed by: SPECIALIST

## 2025-05-09 PROCEDURE — 250N000013 HC RX MED GY IP 250 OP 250 PS 637: Performed by: INTERNAL MEDICINE

## 2025-05-09 PROCEDURE — 85041 AUTOMATED RBC COUNT: CPT | Performed by: INTERNAL MEDICINE

## 2025-05-09 PROCEDURE — 99255 IP/OBS CONSLTJ NEW/EST HI 80: CPT | Performed by: SPECIALIST

## 2025-05-09 PROCEDURE — 74177 CT ABD & PELVIS W/CONTRAST: CPT

## 2025-05-09 PROCEDURE — 120N000001 HC R&B MED SURG/OB

## 2025-05-09 PROCEDURE — 36415 COLL VENOUS BLD VENIPUNCTURE: CPT | Performed by: INTERNAL MEDICINE

## 2025-05-09 PROCEDURE — 258N000003 HC RX IP 258 OP 636: Performed by: INTERNAL MEDICINE

## 2025-05-09 RX ORDER — IOPAMIDOL 755 MG/ML
500 INJECTION, SOLUTION INTRAVASCULAR ONCE
Status: COMPLETED | OUTPATIENT
Start: 2025-05-09 | End: 2025-05-09

## 2025-05-09 RX ORDER — CEFAZOLIN SODIUM 2 G/50ML
2 SOLUTION INTRAVENOUS EVERY 8 HOURS
Status: DISCONTINUED | OUTPATIENT
Start: 2025-05-09 | End: 2025-05-12 | Stop reason: HOSPADM

## 2025-05-09 RX ADMIN — SODIUM CHLORIDE, SODIUM LACTATE, POTASSIUM CHLORIDE, AND CALCIUM CHLORIDE 100 ML/HR: .6; .31; .03; .02 INJECTION, SOLUTION INTRAVENOUS at 23:25

## 2025-05-09 RX ADMIN — CARVEDILOL 6.25 MG: 6.25 TABLET, FILM COATED ORAL at 09:13

## 2025-05-09 RX ADMIN — SODIUM CHLORIDE, SODIUM LACTATE, POTASSIUM CHLORIDE, AND CALCIUM CHLORIDE: .6; .31; .03; .02 INJECTION, SOLUTION INTRAVENOUS at 13:41

## 2025-05-09 RX ADMIN — CEFAZOLIN SODIUM 2 G: 2 SOLUTION INTRAVENOUS at 10:12

## 2025-05-09 RX ADMIN — SODIUM CHLORIDE, SODIUM LACTATE, POTASSIUM CHLORIDE, AND CALCIUM CHLORIDE: .6; .31; .03; .02 INJECTION, SOLUTION INTRAVENOUS at 02:36

## 2025-05-09 RX ADMIN — CEFAZOLIN SODIUM 2 G: 2 SOLUTION INTRAVENOUS at 17:13

## 2025-05-09 RX ADMIN — ASPIRIN 325 MG: 325 TABLET, COATED ORAL at 09:14

## 2025-05-09 RX ADMIN — CARVEDILOL 6.25 MG: 6.25 TABLET, FILM COATED ORAL at 21:59

## 2025-05-09 RX ADMIN — SODIUM CHLORIDE 100 ML: 9 INJECTION, SOLUTION INTRAVENOUS at 09:30

## 2025-05-09 RX ADMIN — IOPAMIDOL 100 ML: 755 INJECTION, SOLUTION INTRAVENOUS at 09:30

## 2025-05-09 RX ADMIN — ATORVASTATIN CALCIUM 40 MG: 40 TABLET, FILM COATED ORAL at 09:14

## 2025-05-09 ASSESSMENT — ACTIVITIES OF DAILY LIVING (ADL)
ADLS_ACUITY_SCORE: 23
ADLS_ACUITY_SCORE: 29
ADLS_ACUITY_SCORE: 23
ADLS_ACUITY_SCORE: 29
ADLS_ACUITY_SCORE: 23
ADLS_ACUITY_SCORE: 29
ADLS_ACUITY_SCORE: 23
ADLS_ACUITY_SCORE: 29
ADLS_ACUITY_SCORE: 23
ADLS_ACUITY_SCORE: 29
ADLS_ACUITY_SCORE: 23

## 2025-05-09 NOTE — PLAN OF CARE
"Pt is alert and oriented. Pt denies any pain or shortness of breath and on room air.    Blood glucose: 268. IV LR infusing at 100 ml/hr.    Pt ambulates independently in room. Ongoing monitoring.    Plan: SW, ID consult.    BP (!) 152/90 (BP Location: Left arm)   Pulse 85   Temp 98.6  F (37  C) (Oral)   Resp 16   Ht 1.905 m (6' 3\")   Wt 103.3 kg (227 lb 11.2 oz)   SpO2 98%   BMI 28.46 kg/m       Problem: Adult Inpatient Plan of Care  Goal: Plan of Care Review  Description: The Plan of Care Review/Shift note should be completed every shift.  The Outcome Evaluation is a brief statement about your assessment that the patient is improving, declining, or no change.  This information will be displayed automatically on your shiftnote.  Outcome: Progressing  Flowsheets (Taken 5/9/2025 0407)  Outcome Evaluation: Pt denies any pain.  Plan of Care Reviewed With: patient  Overall Patient Progress: improving  Goal: Patient-Specific Goal (Individualized)  Description: You can add care plan individualizations to a care plan. Examples of Individualization might be:  \"Parent requests to be called daily at 9am for status\", \"I have a hard time hearing out of my right ear\", or \"Do not touch me to wake me up as it startlesme\".  Outcome: Progressing  Goal: Absence of Hospital-Acquired Illness or Injury  Outcome: Progressing  Intervention: Identify and Manage Fall Risk  Recent Flowsheet Documentation  Taken 5/9/2025 0300 by Ashkan Murdock RN  Safety Promotion/Fall Prevention: safety round/check completed  Taken 5/9/2025 0200 by Ashkan Murdock RN  Safety Promotion/Fall Prevention: safety round/check completed  Taken 5/9/2025 0100 by Ashkan Murdock, ISABEL  Safety Promotion/Fall Prevention: safety round/check completed  Taken 5/9/2025 0000 by Ashkan Murdock RN  Safety Promotion/Fall Prevention:   safety round/check completed   nonskid shoes/slippers when out of bed   lighting adjusted   clutter free environment maintained   " patient and family education   assistive device/personal items within reach  Taken 5/8/2025 2300 by Ashkan Murdock RN  Safety Promotion/Fall Prevention: safety round/check completed  Intervention: Prevent Skin Injury  Recent Flowsheet Documentation  Taken 5/8/2025 2350 by Ashkan Murdock RN  Body Position: position changed independently  Intervention: Prevent Infection  Recent Flowsheet Documentation  Taken 5/9/2025 0000 by Ashkan Murdock RN  Infection Prevention:   single patient room provided   rest/sleep promoted   hand hygiene promoted  Goal: Optimal Comfort and Wellbeing  Outcome: Progressing  Goal: Readiness for Transition of Care  Outcome: Progressing     Problem: Infection  Goal: Absence of Infection Signs and Symptoms  Outcome: Progressing   Goal Outcome Evaluation:      Plan of Care Reviewed With: patient    Overall Patient Progress: improvingOverall Patient Progress: improving    Outcome Evaluation: Pt denies any pain.

## 2025-05-09 NOTE — PLAN OF CARE
"15:33 RN shift summary 7-3:  No c/o pain.  Alert and oriented.  Ambulates independently in room.  Lungs clear. 97% RA.  No tele.  Mod carb diet.  PIV infusing per orders. Continue IV antibiotics, blood sugar checks/insulin per orders. SW and I.D. following.              Goal Outcome Evaluation:      Plan of Care Reviewed With: patient, significant other    Overall Patient Progress: improvingOverall Patient Progress: improving    Outcome Evaluation: No c/o pain.  Continue IV ancef, blood sugar checks/insulin.      Problem: Adult Inpatient Plan of Care  Goal: Plan of Care Review  Description: The Plan of Care Review/Shift note should be completed every shift.  The Outcome Evaluation is a brief statement about your assessment that the patient is improving, declining, or no change.  This information will be displayed automatically on your shiftnote.  Outcome: Progressing  Flowsheets (Taken 5/9/2025 1303)  Outcome Evaluation: No c/o pain.  Continue IV ancef, blood sugar checks/insulin.  Plan of Care Reviewed With:   patient   significant other  Overall Patient Progress: improving  Goal: Patient-Specific Goal (Individualized)  Description: You can add care plan individualizations to a care plan. Examples of Individualization might be:  \"Parent requests to be called daily at 9am for status\", \"I have a hard time hearing out of my right ear\", or \"Do not touch me to wake me up as it startlesme\".  Outcome: Progressing  Goal: Absence of Hospital-Acquired Illness or Injury  Outcome: Progressing  Intervention: Identify and Manage Fall Risk  Recent Flowsheet Documentation  Taken 5/9/2025 0915 by Dimple Hunter, RN  Safety Promotion/Fall Prevention: safety round/check completed  Intervention: Prevent Skin Injury  Recent Flowsheet Documentation  Taken 5/9/2025 0915 by Dimple Hunter, RN  Body Position: position changed independently  Goal: Optimal Comfort and Wellbeing  Outcome: Progressing  Goal: Readiness for Transition of " Care  Outcome: Progressing     Problem: Infection  Goal: Absence of Infection Signs and Symptoms  Outcome: Progressing     Problem: Comorbidity Management  Goal: Blood Glucose Levels Within Targeted Range  Outcome: Progressing  Intervention: Monitor and Manage Glycemia  Recent Flowsheet Documentation  Taken 5/9/2025 0915 by Dimple Hunter, RN  Medication Review/Management: medications reviewed  Goal: Blood Pressure in Desired Range  Outcome: Progressing  Intervention: Maintain Blood Pressure Management  Recent Flowsheet Documentation  Taken 5/9/2025 0915 by Dimple Hunter, RN  Medication Review/Management: medications reviewed

## 2025-05-09 NOTE — CONSULTS
[facility]    Infectious Disease Consultation     Date of Admission:  5/8/2025  Date of Consult (When I saw the patient): 05/09/25    Assessment:  40YM with uncontrolled diabetes complicated by polyneuropathy, who has been admitted due to fever, fatigue, dysuria and flank pain and has been found to have high grade staph aureus MSSA bacteremia with hematogenous seeding of the kidneys with pyelonephritis. No obvious abscess formation. Given extremely poor glycemic control, he remains at risk for further complciations    -High grade MSSA bacteremia, unclear source. Staph aureus is not a urinary pathogen and positive urine cx is suggestive of hematogenous spread  -Uncontrolled diabetes (prior HbA1c of > 11%)  -Hyponatremia  -Hx of osteomyelitis  -Chronic medical conditions- Diabetes, Hyperlipidemia, HTN, anxiety and depression, hx of CVA, tobacco abuse    Recommendations:  Follow blood cxs for clearance  Check HbA1c  CT abdomen pelvis to assess for additional focus of infection  Discontinue Ceftriaxone  Treat with Cefazolin  ID will continue to follow    Jo Yancey MD    Reason for Consult   Reason for consult: I was asked to evaluate this patient for MSSA bacteremia.    Primary Care Physician   Miles Case    Chief Complaint   Fever, dysuria and flank pain    History is obtained from the patient and medical records    History of Present Illness   Deni Cotaes is a 40 year old male with uncontrolled diabetes complicated by polyneuropathy, who has been admitted due to fever, fatigue, dysuria and flank pain and has been found to have high grade staph aureus MSSA bacteremia along with a positive urine cx    He was evaluated in the ED on 5/6 due to fatigue and dizziness as well as right sided flank pain, and was diagnosed with pyelonephritis , discharged on Cefpodoxime/doxycycline and subsequently urine cx grew staph aureus MSSA. Blood cxs from 5/6 also turned positive for MSSA and patient  was asked to return for admission.    Blood cxs from  remain with no growth so far. Patient has remained afebrile and has no leukocytosis.    Currently he feels better, flank pain has resolved. Tolerating antibiotics without side effects    Antimicrobial therapy   Ceftriaxone  Past Medical History   I have reviewed this patient's medical history and updated it with pertinent information if needed.   Past Medical History:   Diagnosis Date    Anxiety     Diabetes mellitus (H)     Hypertension     Osteomyelitis of toe of left foot (H) 12/15/2022       Past Surgical History   I have reviewed this patient's surgical history and updated it with pertinent information if needed.  No past surgical history on file.    Prior to Admission Medications   Prior to Admission Medications   Prescriptions Last Dose Informant Patient Reported? Taking?   Continuous Glucose Sensor (FREESTYLE ESTEFANY 3 SENSOR) [facility] [place]   No No   Si each every 14 days. Use 1 sensor every 14 days. Use to read blood sugars per 's instructions.   FLUoxetine (PROZAC) 40 MG capsule Unknown  Yes Yes   Sig: Take 40 mg by mouth daily as needed.   acetaminophen (TYLENOL) 500 MG tablet   Yes Yes   Sig: Take 1,000 mg by mouth every 6 hours as needed for mild pain.   aspirin (ASA) 325 MG EC tablet 2025 Morning  No Yes   Sig: TAKE 1 TABLET BY MOUTH ONCE DAILY   atorvastatin (LIPITOR) 40 MG tablet 2025 Morning  No Yes   Sig: TAKE 1 TABLET BY MOUTH ONCE DAILY   blood glucose (NO BRAND SPECIFIED) test strip   No No   Sig: Use to test blood sugar 4 times daily or as directed.   blood glucose monitoring (NO BRAND SPECIFIED) meter device kit   No No   Sig: Use to test blood sugar 4 times daily or as directed.   carvedilol (COREG) 6.25 MG tablet 2025 Morning  No Yes   Sig: TAKE 1 TABLET BY MOUTH TWO TIMES A DAY WITH MEALS   cefpodoxime (VANTIN) 200 MG tablet 2025 Morning  No Yes   Sig: Take 1 tablet (200 mg) by mouth 2 times daily for 10 days.    ibuprofen (ADVIL/MOTRIN) 200 MG tablet 5/7/2025  Yes Yes   Sig: Take 800 mg by mouth every 6 hours as needed for pain.   insulin aspart (NOVOLOG FLEXPEN) 100 UNIT/ML pen 5/8/2025 Morning  Yes Yes   Sig: Inject 10-15 Units subcutaneously.   insulin glargine (LANTUS PEN) 100 UNIT/ML pen 5/7/2025 Evening  Yes Yes   Sig: Inject 15 Units subcutaneously at bedtime.   insulin pen needle (31G X 8 MM) 31G X 8 MM miscellaneous   No No   Sig: Use 4 pen needles daily or as directed.   losartan (COZAAR) 50 MG tablet 5/6/2025 Evening  No Yes   Sig: TAKE 1 TABLET BY MOUTH ONCE DAILY   thin (NO BRAND SPECIFIED) lancets   No No   Sig: Use with lanceting device.      Facility-Administered Medications: None     Allergies   No Known Allergies    Immunization History   Immunization History   Administered Date(s) Administered    Hepatitis B, Peds (Engerix-B/Recombivax HB) 09/04/2001    Historical DTP/aP 1986, 1986, 1986, 09/10/1987, 09/09/1991    Historical Hepb 08/03/1998    MMR (MMRII) 07/28/1987, 06/04/1998    Pneumococcal 23 valent 01/07/2011    Polio, Unspecified 1986, 1986, 09/10/1987, 09/09/1991    TDAP (Adacel,Boostrix) 11/22/2015, 12/15/2022    Td (Adult), Adsorbed 06/04/1998       Social History   I have reviewed this patient's social history and updated it with pertinent information if needed. Deni DK Coates  reports that he has never smoked. He does not have any smokeless tobacco history on file. He reports that he does not drink alcohol and does not use drugs.    Family History   I have reviewed this patient's family history and updated it with pertinent information if needed.   No family history on file.    Review of Systems   The 10 point Review of Systems is as per HPI    Physical Exam   Temp: 98.8  F (37.1  C) Temp src: Oral BP: 139/90 Pulse: 79   Resp: 16 SpO2: 98 % O2 Device: None (Room air)    Vital Signs with Ranges  Temp:  [97.3  F (36.3  C)-98.8  F (37.1  C)] 98.8  F (37.1   C)  Pulse:  [76-90] 79  Resp:  [16-18] 16  BP: (117-152)/() 139/90  SpO2:  [96 %-100 %] 98 %  227 lbs 11.2 oz  Body mass index is 28.46 kg/m .    GENERAL APPEARANCE:  awake  EYES: Eyes grossly normal to inspection  NECK: no adenopathy  RESP: lungs clear   CV: regular rates and rhythm  LYMPHATICS: normal ant/post cervical and supraclavicular nodes  ABDOMEN: soft, nontender  MS: extremities normal  SKIN: no suspicious lesions or rashes, some scars and excoriations        Data   All laboratory data reviewed  Component      Latest Ref Rng 5/9/2025  7:00 AM   Sodium      135 - 145 mmol/L 133 (L)    Potassium      3.4 - 5.3 mmol/L 3.9    Chloride      98 - 107 mmol/L 101    Carbon Dioxide (CO2)      22 - 29 mmol/L 20 (L)    Anion Gap      7 - 15 mmol/L 12    Urea Nitrogen      6.0 - 20.0 mg/dL 12.8    Creatinine      0.67 - 1.17 mg/dL 0.53 (L)    GFR Estimate      >60 mL/min/1.73m2 >90    Calcium      8.8 - 10.4 mg/dL 8.1 (L)    Glucose      70 - 99 mg/dL 343 (H)    WBC      4.0 - 11.0 10e3/uL 7.8    RBC Count      4.40 - 5.90 10e6/uL 4.46    Hemoglobin      13.3 - 17.7 g/dL 13.2 (L)    Hematocrit      40.0 - 53.0 % 38.3 (L)    MCV      78 - 100 fL 86    MCH      26.5 - 33.0 pg 29.6    MCHC      31.5 - 36.5 g/dL 34.5    RDW      10.0 - 15.0 % 11.9    Platelet Count      150 - 450 10e3/uL 350       Microbiology  5/08/2025 1335 05/09/2025 0631 Blood Culture Peripheral blood (BC) Arm, Right [[id number]]   Peripheral blood (BC) from Arm, Right    Preliminary result Component Value   Culture No growth after 12 hours P             05/08/2025 1306 05/08/2025 1402 Influenza A/B, RSV and SARS-CoV2 PCR (COVID-19) Nasopharyngeal [[id number]]    Swab from Nasopharyngeal    Final result Component Value   Influenza A PCR Negative   Influenza B PCR Negative   RSV PCR Negative   SARS CoV2 PCR Negative   NEGATIVE: SARS-CoV-2 (COVID-19) RNA not detected, presumed negative.          05/08/2025 1306 05/08/2025 9047 Group A  Streptococcus PCR Throat Swab [[id number]]    Swab from Throat    Final result Component Value   Group A strep by PCR Not Detected          05/08/2025 1256 05/09/2025 0402 Blood Culture Peripheral blood (BC) Arm, Left [[id number]]   Peripheral blood (BC) from Arm, Left    Preliminary result Component Value   Culture No growth after 12 hours P             05/06/2025 1548 05/07/2025 1206 Chlamydia trachomatis PCR [[id number]]   Urine, Voided    Final result Component Value   Chlamydia trachomatis Negative   A negative result by transcription mediated amplification does not preclude the presence of C. trachomatis infection because results are dependent on proper and adequate collection, absence of inhibitors and sufficient rRNA to be detected.   Chlamydia trachomatis Specimen Source Urine, Voided          05/06/2025 1548 05/07/2025 1206 Neisseria gonorrhoea PCR [[id number]]   Urine, Voided    Final result Component Value   Neisseria gonorrhoeae Negative   Negative for N. gonorrhoeae rRNA by transcription mediated amplification. A negative result by transcription mediated amplification does not preclude the presence of C. trachomatis infection because results are dependent on proper and adequate collection, absence of inhibitors and sufficient rRNA to be detected.   Neisseria gonorrhoeae Specimen Source Urine, Voided          05/06/2025 1430 05/08/2025 1243 Blood Culture Arm, Right [[id number]]   (Abnormal)   Blood from Arm, Right    Preliminary result Component Value   Culture Positive on the 1st day of incubation Abnormal  P    Staphylococcus aureus Panic  P    1 of 2 bottles  Susceptibilities done on previous cultures             05/06/2025 1430 05/08/2025 1226 Blood Culture Arm, Right [[id number]]    (Abnormal)   Blood from Arm, Right    Preliminary result Component Value   Culture Positive on the 1st day of incubation Abnormal  P    Staphylococcus aureus Panic  P    2 of 2 bottles        Susceptibility     Staphylococcus aureus     RONDA     Ciprofloxacin <=0.5 ug/mL Susceptible *     Clindamycin 0.25 ug/mL Susceptible     Daptomycin 0.25 ug/mL Susceptible     Doxycycline <=0.5 ug/mL Susceptible     Erythromycin <=0.25 ug/mL Susceptible     Gentamicin <=0.5 ug/mL Susceptible     Inducible macrolide resistance test Negative ug/mL Negative *     Levofloxacin <=0.12 ug/mL Susceptible *     Linezolid 2 ug/mL Susceptible *     Moxifloxacin <=0.25 ug/mL Susceptible *     Nitrofurantoin <=16 ug/mL Susceptible *     Oxacillin <=0.25 ug/mL Susceptible 1     Rifampin <=0.5 ug/mL Susceptible *     Tetracycline <=1 ug/mL Susceptible     Tigecycline <=0.12 ug/mL Susceptible *     Trimethoprim/Sulfamethoxazole <=0.5/9.5 u... Susceptible     Vancomycin <=0.5 ug/mL Susceptible            Imaging  5/8 TTE  Interpretation Summary     The visual ejection fraction is 55-60%.  Left ventricular systolic function is normal.  There is trace aortic regurgitation.  There is no evidence of a mass or vegetation. This does not rule out  endocarditis.

## 2025-05-09 NOTE — PROGRESS NOTES
[facility]    Medicine Progress Note - Hospitalist Service    Date of Admission:  5/8/2025    Primary Care Physician   Miles Case  CONSULTANTS: ID    Assessment & Plan     Deni Coates is a 40 year old male who has a complex medical history including insulin-dependent type 2 diabetes with polyneuropathy, hypertension, anxiety depression, hyperlipidemia, previous osteomyelitis of the left toe, and previous stroke who is presenting with MSSA bacteremia.  As the story goes, the patient presented to the ER 5/6/2024 with fatigue, dizziness, flank tenderness, myalgias, dysuria, and fever.  He was found to be dehydrated with an elevated blood sugar and hyponatremia.  He was diagnosed with likely pyelonephritis and was sent home with doxycycline and Vantin to cover for both sexually-transmitted diseases as well as pyelonephritis.  His urine culture at that time was positive for MSSA and subsequently his blood cultures turn positive as well.  Patient was told to come back to the hospital due to the positive blood cultures     Today, patient is actually feeling much better than he did a couple days ago.  He did have some aches last night but otherwise his flank pain has resolved he has no fevers, denies any chills.  His dizziness has improved as well.  Patient's blood sugars still are uncontrolled.  In the emergency room he was found to have a white blood cell count of 9.7 which is improved from 13.3 a couple days ago.  His sodium is up to 132 from 126 a couple days ago.  His BUN 16, creatinine 0.55, he does have ketones in his urine still and his sugar is still elevated at 351.  His lactic acid is normal at 0.6 and his VBG was normal as well.  Patient was started on Ancef in the emergency room and is being admitted to the hospital.         MSSA bacteremia, pyelonephritis due to MSSA  Patient originally presented 5/6/2025 to the emergency room was diagnosed with pyelonephritis with an  abnormal urinalysis and a culture came back as Staph aureus.  Subsequently his blood cultures grew out MSSA as well.  He initially was sent home on Vantin/doxycycline and at this point would be placed on IV antibiotics.  Initially was placed on Ancef in the emergency room but I will switch him to Rocephin for once a day dosing for compliance issues.  Infectious disease  consulted.  At this point we do not have a source.  TTE echo negative for vegitations. There is no obvious murmurs however on exam to suggest endocarditis.  Patient does have a history of osteomyelitis of his toe in 2022.  I am not sure what his source is at this point.  May need to consider white blood cell tagged scan,  Repeat blood cultures been sent in the emergency room and if remain negative likely will need to have PICC line placed for ongoing IV antibiotics at home.  Will await infectious diseases input.  Patient denies any IV drug use.  Likely the patient is already feeling better from 2 days ago his white blood cell count is down to 9.7 from 13.3 and does not appear to be septic.   Per ID recommendations,  starting on ancef (my concern is compliance with q8 hour med).  CT scan done looking for a source..  Bilateral patchy areas of focal hypoenhancement involving each kidney. This pattern is consistent with bilateral pyelonephritis.   Social work to look into insurance coverage for IV antibiotics. Will need to await culture results before placing a line     Fatty liver, tiny left renal stone  Seen on CT    Dehydration, ketosis   Patient is quite dehydrated a couple days ago and he continues to have ketones in his urine.  Patient will be given IV fluids.     Type 2 insulin dependent diabetes   Patient's blood sugars have been out of control since having his infection.  He presented with a blood sugar over 300.  Patient has a hemoglobin A1c of 11.6 as of 6/27/2024.  I suspect his blood sugar's have been even higher since then with his  infection.  Patient to continue on Lantus and continues on insulin correction scale.  Not sure why given the patient's age she is not taking prandial insulin or carb counting.  Hgb A1c high at 12.7.   will add prandial carb counting and titrate lantus as needed.      Hyponatremia   Patient had a sodium down to 126 a couple days ago and its up to 132.  Will continue patient IV fluids and recheck in the morning.     Essential hypertension    Patient will continue on his home Coreg and Cozaar     Anxiety/depression   Patient continues on his home Prozac     Hyperlipidemia   Patient continues on Lipitor     History osteomyelitis   In 12/2022 the patient had a left toe osteomyelitis.  I am unable to find any records or culture showing how he was treated or what his cultures grew out.     History ischemic stroke  Patient continues on aspirin, statin, and a beta-blocker     Tobacco abuse  Patient continues to smoke at home.  Has not smoked for a few days he said does not need nicotine replacement.  Cessation was discussed at length with the patient.  Given his age and history of stroke he needs to be off of all nicotine.          Discussed plan of care with nursing, social work/case management      Diet: Moderate Consistent Carb (60 g CHO per Meal) Diet    DVT Prophylaxis: Pneumatic Compression Devices  Márquez Catheter: Not present  Lines: None     Cardiac Monitoring: None    RESTRAINTS: not indicated  Code Status: Full Code        This document was created using voice recognition technology.  Please excuse any typographical errors that may have occurred.  Please call with any questions.          Clinically Significant Risk Factors Present on Admission          # Hyponatremia: Lowest Na = 132 mmol/L in last 2 days, will monitor as appropriate  # Hypochloremia: Lowest Cl = 96 mmol/L in last 2 days, will monitor as appropriate  # Hypocalcemia: Lowest Ca = 8.1 mg/dL in last 2 days, will monitor and replace as appropriate      "  # Drug Induced Platelet Defect: home medication list includes an antiplatelet medication   # Hypertension: Noted on problem list          # DMII: A1C = 12.7 % (Ref range: <5.7 %) within past 6 months   # Overweight: Estimated body mass index is 28.46 kg/m  as calculated from the following:    Height as of this encounter: 1.905 m (6' 3\").    Weight as of this encounter: 103.3 kg (227 lb 11.2 oz).       # Financial/Environmental Concerns:           Disposition Plan      Expected Discharge Date: 05/12/2025                  Barrier to discharge: cultures, will need PICC/IV antibiotics  Medically Ready for Discharge: Anticipated in 2-4 Days       Pedro Bassett MD  Hospitalist Service  [facility]  Securely message with Network (more info)  Text page via [employer] Paging/Directory   ______________________________________________________________________    Interval History   Patient doing fine, no new complaints.  Denies fever, chills, sweats, myalgias, flank pain.  He feels fine.  Echo done negative for vegetations      ROS: A comprehensive review of systems was negative except for items noted in the HPI.  Patient currently denies any fever, chills, sweats, nausea, vomiting, diarrhea, shortness of breath, or chest pain.       Physical Exam   Vital Signs: Temp: 98.8  F (37.1  C) Temp src: Oral BP: 139/90 Pulse: 79   Resp: 16 SpO2: 98 % O2 Device: None (Room air)    Weight: 227 lbs 11.2 oz        General appearance: Patient is alert and oriented x3, no apparent distress, pleasant and conversing normally, speaking in full sentences, appears stated age, lying in bed  HEENT:   Mucous membranes are moist  NECK:  supple without bruit or lymphadenopathy  RESPIRATORY: Clear to auscultation bilateral, good air movement  Back no flank tenderness to thumping or palpipations  CARDIOVASCULAR: Regular rate and rhythm, normal S1/S2, no murmurs, rubs, or gallops present, peripheral pulses intact  GASTROINTESTINAL: " Non-distended, non-tender, soft, bowel sounds present throughou  NEUROLOGIC:  Cranial nerves II-XII intact, without any focal deficits, strength 5/5 throughout  EXTREMITIES:  Moves all extremities, no clubbing, cyanosis, nor edema  :  Foleynot present         Data     I have personally reviewed the following data over the past 24 hrs:    7.8  \   13.2 (L)   / 350     133 (L) 101 12.8 /  298 (H)   3.9 20 (L) 0.53 (L) \     ALT: 20 AST: 14 AP: 128 TBILI: 0.3   ALB: 3.5 TOT PROTEIN: 7.2 LIPASE: N/A     TSH: N/A T4: N/A A1C: 12.7 (H)     Procal: N/A CRP: N/A Lactic Acid: 0.6 (L)         Imaging:   Results for orders placed or performed during the hospital encounter of 05/08/25   CT Abdomen Pelvis w Contrast    Narrative    EXAM: CT ABDOMEN PELVIS W CONTRAST  LOCATION: [facility]  DATE: 5/9/2025    INDICATION: High grade staph aureus bacteremia, assess for additional focus of infection.    COMPARISON: CT 2/28/2012.    TECHNIQUE: CT scan of the abdomen and pelvis was performed following injection of intravenous contrast. Multiplanar reformats were obtained. Dose reduction techniques were used.    CONTRAST: 100 mL Isovue 370.    FINDINGS:   LOWER CHEST: Normal.    HEPATOBILIARY: There may be mild fatty liver. No acute liver or gallbladder abnormality. No calcified gallstones.    PANCREAS: Normal.    SPLEEN: Normal.    ADRENAL GLANDS: Normal.    KIDNEYS/BLADDER: There are multifocal bilateral regions of parenchymal hypoenhancement that appear ill-defined. One of the largest is along the anterior right mid to low kidney measuring 2 cm series 3, image 96. Other examples seen at the lower right   kidney and lower left kidney on image 116. No hydronephrosis. Tiny 1 mm nonobstructing stone mid left kidney series 3, image 82. The bladder appears unremarkable.    BOWEL: Normal appendix. No acute inflammatory change of the bowel. No bowel obstruction. Moderate stool. No free fluid or free air. No abscess  identified.    LYMPH NODES: Normal.    VASCULATURE: Mild calcifications.    PELVIC ORGANS: No acute abnormality.    MUSCULOSKELETAL: No acute abnormality.      Impression    IMPRESSION:   1.  Bilateral patchy areas of focal hypoenhancement involving each kidney. This pattern is consistent with bilateral pyelonephritis. Recommend follow-up CT or MRI of the kidneys to ensure resolution.  2.  Tiny nonobstructing stone at the left kidney.  3.  Suggestion of fatty liver.     Echocardiogram Complete     Value    LVEF  55-60%    [facility]    [id number]  [id number]  [id number]  [id number]^JERRY^BREANNA^ALONZO     [facility]  Echocardiography Laboratory  [address] [place] [address]     Name: MIKE HOFFMANN  MRN: [id number]  : 1985  Study Date: 2025 02:44 PM  Age: 40 yrs  Gender: Male  Patient Location: [facility]  Reason For Study: Endocarditis  Ordering Physician: BREANNA EDWARDS  Performed By: Ayala Lowery     BSA: 2.3 m2  Height: 76 in  Weight: 228 lb  BP: 124/91 mmHg  ______________________________________________________________________________  Procedure  Echocardiogram with two-dimensional, color and spectral Doppler.  ______________________________________________________________________________  Interpretation Summary     The visual ejection fraction is 55-60%.  Left ventricular systolic function is normal.  There is trace aortic regurgitation.  There is no evidence of a mass or vegetation. This does not rule out  endocarditis.  ______________________________________________________________________________  Left Ventricle  The left ventricle is normal in size. There is normal left ventricular wall  thickness. Diastolic Doppler findings (E/E' ratio and/or other parameters)  suggest left ventricular filling pressures are indeterminate. The visual  ejection fraction is 55-60%. Left ventricular systolic function is normal.     Right Ventricle  The right ventricle is normal in size and  function.     Atria  Normal left atrial size. Right atrial size is normal. There is no color  Doppler evidence of an atrial shunt.     Mitral Valve  There is trace mitral regurgitation.     Tricuspid Valve  There is trace tricuspid regurgitation.     Aortic Valve  The aortic valve is trileaflet. There is trace aortic regurgitation. No  hemodynamically significant valvular aortic stenosis.     Pulmonic Valve  There is no pulmonic valvular regurgitation. Normal pulmonic valve velocity.     Vessels  The aortic root is normal size. Normal size ascending aorta. IVC diameter <2.1  cm collapsing >50% with sniff suggests a normal RA pressure of 3 mmHg.     Pericardium  There is no pericardial effusion.     Rhythm  Sinus rhythm was noted.  ______________________________________________________________________________  MMode/2D Measurements & Calculations  IVSd: 0.99 cm  LVIDd: 5.1 cm  LVIDs: 3.2 cm  LVPWd: 0.82 cm  FS: 37.8 %  LV mass(C)d: 168.1 grams  LV mass(C)dI: 71.8 grams/m2  Ao root diam: 3.9 cm  asc Aorta Diam: 3.2 cm  LVOT diam: 2.6 cm  LVOT area: 5.3 cm2  Ao root diam index Ht(cm/m): 2.0  Ao root diam index BSA (cm/m2): 1.7  Asc Ao diam index BSA (cm/m2): 1.4  Asc Ao diam index Ht(cm/m): 1.6  LA Volume (BP): 55.5 ml     LA Volume Index (BP): 23.7 ml/m2  RWT: 0.32  TAPSE: 2.2 cm     Doppler Measurements & Calculations  MV E max loy: 66.2 cm/sec  MV A max loy: 58.8 cm/sec  MV E/A: 1.1  MV dec time: 0.20 sec  PA acc time: 0.14 sec  E/E' av.3  Lateral E/e': 5.8  Medial E/e': 8.8     ______________________________________________________________________________  Report approved by: Ryan Pizano MD on 2025 04:15 PM           Procedures: CT abdomen/pelvis pending per ID  I have personally have reviewed the patient's most up to date radiologic exams,echo, labs, orders, and medications myself

## 2025-05-09 NOTE — CONSULTS
CLINICAL NUTRITION SERVICES - ASSESSMENT NOTE    Registered Dietitian Interventions:  Provided brief diabetes diet education with print outs today including - carbohydrate counting for those with diabetes, the plate method, and how to read nutrition labels. Emphasized importance of controlling blood sugars.        REASON FOR ASSESSMENT  Positive admission nutrition risk screen    PMH: insulin-dependent type 2 diabetes with polyneuropathy, hypertension, anxiety depression, hyperlipidemia, previous osteomyelitis of the left toe, and previous stroke     Patient admitted for fatigue, dizziness, flank tenderness, myalgias, dysuria, and fever.   He was found to be dehydrated with an elevated blood sugar and hyponatremia. He was diagnosed with likely pyelonephritis and was sent home with doxycycline and Vantin to cover for both sexually-transmitted diseases as well as pyelonephritis. His urine culture at that time was positive for MSSA and subsequently his blood cultures turn positive as well. Patient was told to come back to the hospital due to the positive blood cultures     INFORMATION OBTAINED  Assessed patient in room.    NUTRITION HISTORY  Patient reports poor appetite and decreased intake for ~1 weeks PTA. He also noticed he has lost weight without trying, reports UBW of ~238 lbs.     CURRENT NUTRITION ORDERS  Diet: Moderate Consistent Carbohydrate    CURRENT INTAKE/TOLERANCE  100% intakes are documented.  Patient has ordered 2 nutritionally adequate meals since admit.     Patient reports to be tolerating diet.     RD discussed diet guidelines for diabetes. Explained that weight loss, dizziness, n/v, etc can occur from uncontrolled blood sugars. Encouraged eating balanced meals and monitoring portion sizes of carbohydrates to help manage blood sugars. Patient was agreeable, answered all questions at this time.     LABS  Nutrition-relevant labs: Na 133, Cr 0.53,     MEDICATIONS  Nutrition-relevant medications:  "insulin, LR    ANTHROPOMETRICS  Height: 190.5 cm (6' 3\")  Admission Weight: 103.5 kg (228 lb 2.8 oz) (05/08/25 1209)   Most Recent Weight: 103.3 kg (227 lb 11.2 oz) (05/08/25 1749)  IBW: 196 lbs  BMI: Body mass index is 28.46 kg/m .   Weight History:  -9% wt loss in 9 months.   Wt Readings from Last 20 Encounters:   05/08/25 103.3 kg (227 lb 11.2 oz)   05/06/25 106.9 kg (235 lb 10.8 oz)   08/07/24 113.4 kg (250 lb)   07/02/24 113 kg (249 lb 1.6 oz)   06/27/24 110.5 kg (243 lb 9.7 oz)   03/29/24 116.3 kg (256 lb 6.4 oz)   03/24/24 115.2 kg (254 lb)   03/11/24 110.7 kg (244 lb 0.8 oz)   09/28/16 106.6 kg (235 lb 1.6 oz)   05/09/15 113.4 kg (250 lb)   02/06/14 114.8 kg (253 lb)   12/30/13 115.7 kg (255 lb)   02/24/12 116.1 kg (256 lb)        ASSESSED NUTRITION NEEDS  Dosing Weight: 103 kg, based on actual wt  Estimated Energy Needs: >/= 2060 kcals/day (20+ kcal/kg)  Justification: Maintenance  Estimated Protein Needs: >/=103 grams protein/day (1+ grams of pro/kg)  Justification: Maintenance  Estimated Fluid Needs: 1 mL/kcal  Justification: Maintenance    SYSTEM AND PHYSICAL FINDINGS    GI: Reviewed    Skin/wounds: no documentation of PI; no documentation of edema    MALNUTRITION  % Intake: < 75% for > 7 days (moderate)  % Weight Loss: Weight loss does not meet criteria   Subcutaneous Fat Loss: None observed  Muscle Loss: None observed  Fluid Accumulation/Edema: None noted  Malnutrition Diagnosis: Patient does not meet two of the established criteria necessary for diagnosing malnutrition  Malnutrition Present on Admission: No    NUTRITION DIAGNOSIS  Inadequate oral intake related to poor appetite as evidenced by pt report, evidence of wt loss.     INTERVENTIONS  Nutrition counseling strategies  Nutrition education application  Nutrition education content    GOALS  Patient to consume % of nutritionally adequate (carb controlled) meal trays TID, or the equivalent with supplements/snacks.   " "  MONITORING/EVALUATION  Progress toward goals will be monitored and evaluated per policy.      Massiel Rivera MS, RD, LD  [facility] Message Group: \"Dietitian [[place]]\"  Office Phone: [phone]  Pagers: 3rd floor/ICU: [phone]  All other floors: [phone]  Weekend/holiday: [phone]    "

## 2025-05-09 NOTE — PLAN OF CARE
"[id number]    VSS on RA x mild HTN. A/O x4. Denies pain, chest pain, SOB, dysuria. LR infusing. IV rocephin. Plan for ID consult.     BP (!) 148/92   Pulse 85   Temp 97.3  F (36.3  C) (Temporal)   Resp 16   Ht 1.905 m (6' 3\")   Wt 103.3 kg (227 lb 11.2 oz)   SpO2 99%   BMI 28.46 kg/m      Goal Outcome Evaluation:      Plan of Care Reviewed With: patient    Overall Patient Progress: no changeOverall Patient Progress: no change    Outcome Evaluation: BGs elevated, insulin per MAR.      Problem: Adult Inpatient Plan of Care  Goal: Plan of Care Review  Description: The Plan of Care Review/Shift note should be completed every shift.  The Outcome Evaluation is a brief statement about your assessment that the patient is improving, declining, or no change.  This information will be displayed automatically on your shiftnote.  Outcome: Not Progressing  Flowsheets (Taken 5/8/2025 2257)  Outcome Evaluation: BGs elevated, insulin per MAR.  Plan of Care Reviewed With: patient  Overall Patient Progress: no change  Goal: Absence of Hospital-Acquired Illness or Injury  Outcome: Not Progressing  Intervention: Identify and Manage Fall Risk  Recent Flowsheet Documentation  Taken 5/8/2025 2034 by Avani Galdamez RN  Safety Promotion/Fall Prevention:   assistive device/personal items within reach   clutter free environment maintained   patient and family education   room organization consistent   safety round/check completed  Goal: Optimal Comfort and Wellbeing  Outcome: Not Progressing  Goal: Readiness for Transition of Care  Outcome: Not Progressing     Problem: Infection  Goal: Absence of Infection Signs and Symptoms  Outcome: Not Progressing     "

## 2025-05-10 ENCOUNTER — ENROLLMENT (OUTPATIENT)
Dept: HOME HEALTH SERVICES | Facility: HOME HEALTH | Age: 40
End: 2025-05-10
Payer: MEDICAID

## 2025-05-10 LAB
GLUCOSE BLDC GLUCOMTR-MCNC: 186 MG/DL (ref 70–99)
GLUCOSE BLDC GLUCOMTR-MCNC: 248 MG/DL (ref 70–99)
GLUCOSE BLDC GLUCOMTR-MCNC: 305 MG/DL (ref 70–99)
GLUCOSE BLDC GLUCOMTR-MCNC: 310 MG/DL (ref 70–99)
GLUCOSE BLDC GLUCOMTR-MCNC: 317 MG/DL (ref 70–99)

## 2025-05-10 PROCEDURE — 99233 SBSQ HOSP IP/OBS HIGH 50: CPT | Performed by: INTERNAL MEDICINE

## 2025-05-10 PROCEDURE — 258N000003 HC RX IP 258 OP 636: Performed by: INTERNAL MEDICINE

## 2025-05-10 PROCEDURE — 250N000013 HC RX MED GY IP 250 OP 250 PS 637: Performed by: INTERNAL MEDICINE

## 2025-05-10 PROCEDURE — 120N000001 HC R&B MED SURG/OB

## 2025-05-10 PROCEDURE — 99232 SBSQ HOSP IP/OBS MODERATE 35: CPT | Performed by: INTERNAL MEDICINE

## 2025-05-10 PROCEDURE — 250N000011 HC RX IP 250 OP 636: Performed by: SPECIALIST

## 2025-05-10 RX ADMIN — CARVEDILOL 6.25 MG: 6.25 TABLET, FILM COATED ORAL at 21:48

## 2025-05-10 RX ADMIN — CEFAZOLIN SODIUM 2 G: 2 SOLUTION INTRAVENOUS at 00:01

## 2025-05-10 RX ADMIN — CARVEDILOL 6.25 MG: 6.25 TABLET, FILM COATED ORAL at 09:03

## 2025-05-10 RX ADMIN — ASPIRIN 325 MG: 325 TABLET, COATED ORAL at 09:02

## 2025-05-10 RX ADMIN — SODIUM CHLORIDE, SODIUM LACTATE, POTASSIUM CHLORIDE, AND CALCIUM CHLORIDE: .6; .31; .03; .02 INJECTION, SOLUTION INTRAVENOUS at 11:24

## 2025-05-10 RX ADMIN — CEFAZOLIN SODIUM 2 G: 2 SOLUTION INTRAVENOUS at 09:01

## 2025-05-10 RX ADMIN — CEFAZOLIN SODIUM 2 G: 2 SOLUTION INTRAVENOUS at 17:04

## 2025-05-10 RX ADMIN — ATORVASTATIN CALCIUM 40 MG: 40 TABLET, FILM COATED ORAL at 09:02

## 2025-05-10 ASSESSMENT — ACTIVITIES OF DAILY LIVING (ADL)
ADLS_ACUITY_SCORE: 29
DEPENDENT_IADLS:: INDEPENDENT
ADLS_ACUITY_SCORE: 29

## 2025-05-10 NOTE — PLAN OF CARE
"    Goal Outcome Evaluation:     Pt AOX4. Scheduled meds given per orders.. Denies pain at this shift.. LR infusing per orders. On IV ancef. ID following. Bg elevated, on sliding scale, carb coverage, and lantus. Independent with mobility. IV Ancef given q8hr.Potential home IV ABX pending waiting social work consult.              Goal Outcome Evaluation:      Plan of Care Reviewed With: patient    Overall Patient Progress: improvingOverall Patient Progress: improving    Outcome Evaluation: Pt improving, on IV Ancef.      Problem: Adult Inpatient Plan of Care  Goal: Plan of Care Review  Description: The Plan of Care Review/Shift note should be completed every shift.  The Outcome Evaluation is a brief statement about your assessment that the patient is improving, declining, or no change.  This information will be displayed automatically on your shiftnote.  Outcome: Progressing  Flowsheets (Taken 5/10/2025 0550)  Outcome Evaluation: Pt improving, on IV Ancef.  Plan of Care Reviewed With: patient  Overall Patient Progress: improving  Goal: Patient-Specific Goal (Individualized)  Description: You can add care plan individualizations to a care plan. Examples of Individualization might be:  \"Parent requests to be called daily at 9am for status\", \"I have a hard time hearing out of my right ear\", or \"Do not touch me to wake me up as it startlesme\".  Outcome: Progressing  Goal: Absence of Hospital-Acquired Illness or Injury  Outcome: Progressing  Intervention: Identify and Manage Fall Risk  Recent Flowsheet Documentation  Taken 5/10/2025 0021 by Solomon Boateng, RN  Safety Promotion/Fall Prevention:   room near nurse's station   safety round/check completed   patient and family education   clutter free environment maintained  Intervention: Prevent Skin Injury  Recent Flowsheet Documentation  Taken 5/10/2025 0021 by Solomon Boateng, RN  Body Position: position changed independently  Intervention: Prevent and Manage VTE (Venous " Thromboembolism) Risk  Recent Flowsheet Documentation  Taken 5/10/2025 0021 by Solomon Boateng RN  VTE Prevention/Management:   SCDs off (sequential compression devices)   other (see comments)  Intervention: Prevent Infection  Recent Flowsheet Documentation  Taken 5/10/2025 0021 by Solomon Boateng RN  Infection Prevention:   single patient room provided   rest/sleep promoted   equipment surfaces disinfected  Goal: Optimal Comfort and Wellbeing  Outcome: Progressing  Goal: Readiness for Transition of Care  Outcome: Progressing     Problem: Infection  Goal: Absence of Infection Signs and Symptoms  Outcome: Progressing     Problem: Comorbidity Management  Goal: Blood Glucose Levels Within Targeted Range  Outcome: Progressing  Intervention: Monitor and Manage Glycemia  Recent Flowsheet Documentation  Taken 5/10/2025 0021 by Solomon Boateng RN  Medication Review/Management: medications reviewed  Goal: Blood Pressure in Desired Range  Outcome: Progressing  Intervention: Maintain Blood Pressure Management  Recent Flowsheet Documentation  Taken 5/10/2025 0021 by Solomon Boateng RN  Medication Review/Management: medications reviewed

## 2025-05-10 NOTE — PROGRESS NOTES
[facility]  Infectious Disease Progress Note          Assessment and Plan:   Date of Admission:  5/8/2025  Date of Consult (When I saw the patient): 05/09/25     Assessment:  40YM with uncontrolled diabetes complicated by polyneuropathy, who has been admitted due to fever, fatigue, dysuria and flank pain and has been found to have high grade staph aureus MSSA bacteremia with hematogenous seeding of the kidneys with pyelonephritis. No obvious abscess formation. Given extremely poor glycemic control, he remains at risk for further complciations     -High grade MSSA bacteremia, unclear source. Staph aureus is not a urinary pathogen and positive urine cx is suggestive of hematogenous spread  -Uncontrolled diabetes (prior HbA1c of > 11%)  -Hyponatremia  -Hx of osteomyelitis  -Chronic medical conditions- Diabetes, Hyperlipidemia, HTN, anxiety and depression, hx of CVA, tobacco abuse     Recommendations:  Follow blood cxs for clearance appears to have cleared, May 8 culture still negative  Check HbA1c note poorly controlled diabetes  CT abdomen pelvis to assess for additional focus of infection negative  Discontinue Ceftriaxone  Treat with Cefazolin plan  full 4-week course   Social service check on IV coverage, hold 1 more day on line placement tomorrow can get a line and hopefully able to arrange IV antibiotics        Interval History:     no new complaints and doing well; no cp, sob, n/v/d, or abd pain.  No pain site, no ongoing fever, blood cultures remain negative and follow-up no obvious secondary sites              Medications:     Current Facility-Administered Medications   Medication Dose Route Frequency Provider Last Rate Last Admin    aspirin (ASA) EC tablet 325 mg  325 mg Oral Daily Pedro Bassett MD   325 mg at 05/10/25 0902    atorvastatin (LIPITOR) tablet 40 mg  40 mg Oral Daily Pedro Bassett MD   40 mg at 05/10/25 0902    carvedilol (COREG) tablet 6.25 mg  6.25 mg Oral  "BID w/meals Pedro Bassett MD   6.25 mg at 05/10/25 0903    ceFAZolin (ANCEF) 2 g in dextrose 50 mL intermittent infusion  2 g Intravenous Q8H Jo Yancey MD   2 g at 05/10/25 0901    FLUoxetine (PROzac) capsule 40 mg  40 mg Oral Daily Pedro Bassett MD        insulin aspart (NovoLOG) injection (RAPID ACTING)  1-10 Units Subcutaneous TID AC Pedro Bassett MD   7 Units at 05/10/25 1455    insulin aspart (NovoLOG) injection (RAPID ACTING)  1-7 Units Subcutaneous At Bedtime Pedro Bassett MD        insulin aspart (NovoLOG) injection (RAPID ACTING)   Subcutaneous TID w/meals Pedro Bassett MD   10 Units at 05/10/25 1456    insulin glargine (LANTUS PEN) injection 25 Units  25 Units Subcutaneous At Bedtime Pedro Bassett MD   25 Units at 05/09/25 2159    losartan (COZAAR) tablet 50 mg  50 mg Oral QPM Pedro Bassett MD        oxymetazoline (AFRIN) 0.05 % spray 2 spray  2 spray Both Nostrils BID Pedro Bassett MD        sodium chloride (PF) 0.9% PF flush 3 mL  3 mL Intracatheter Q8H ADÁN Pedro Bassett MD                      Physical Exam:   Blood pressure 137/86, pulse 78, temperature 98.2  F (36.8  C), temperature source Oral, resp. rate 20, height 1.905 m (6' 3\"), weight 103.3 kg (227 lb 11.2 oz), SpO2 97%.  Wt Readings from Last 2 Encounters:   05/08/25 103.3 kg (227 lb 11.2 oz)   05/06/25 106.9 kg (235 lb 10.8 oz)     Vital Signs with Ranges  Temp:  [97.7  F (36.5  C)-99.5  F (37.5  C)] 98.2  F (36.8  C)  Pulse:  [75-80] 78  Resp:  [18-20] 20  BP: (136-157)/(82-99) 137/86  SpO2:  [97 %-99 %] 97 %    Constitutional: Awake, alert, cooperative, no apparent distress     Lungs: Clear to auscultation bilaterally, no crackles or wheezing   Cardiovascular: Regular rate and rhythm, normal S1 and S2, and no murmur noted   Abdomen: Normal bowel sounds, soft, non-distended, non-tender   Skin: No rashes, no cyanosis, no edema   Other:           Data:   All microbiology " "laboratory data reviewed.  Recent Labs   Lab Test 05/09/25  0700 05/08/25  1255 05/06/25  1153   WBC 7.8 9.7 13.3*   HGB 13.2* 14.2 14.5   HCT 38.3* 41.0 41.6   MCV 86 85 86    349 329     Recent Labs   Lab Test 05/09/25  0700 05/08/25  1255 05/06/25  1153   CR 0.53* 0.55* 0.63*     No lab results found.  No lab results found.    Invalid input(s): \"UC\"     "

## 2025-05-10 NOTE — PLAN OF CARE
"16:09 RN shift summary 7-3:  No c/o pain.  Alert and oriented.  Ambulates independently in room.  Lungs clear. 97% RA.  No tele.  Mod carb diet.  PIV infusing maintence fluids. Continue IV antibiotics, blood sugar checks/insulin per orders. Plan to discharge home with long term antibiotics once a line is placed. SW and I.D. following.         Goal Outcome Evaluation:      Plan of Care Reviewed With: patient    Overall Patient Progress: improvingOverall Patient Progress: improving    Outcome Evaluation: Continue IV ancef, IV fluids, blood sugar checks/insulin.  I.D. following.      Problem: Adult Inpatient Plan of Care  Goal: Plan of Care Review  Description: The Plan of Care Review/Shift note should be completed every shift.  The Outcome Evaluation is a brief statement about your assessment that the patient is improving, declining, or no change.  This information will be displayed automatically on your shiftnote.  Outcome: Progressing  Flowsheets (Taken 5/10/2025 1340)  Outcome Evaluation: Continue IV ancef, IV fluids, blood sugar checks/insulin.  I.D. following.  Plan of Care Reviewed With: patient  Overall Patient Progress: improving  Goal: Patient-Specific Goal (Individualized)  Description: You can add care plan individualizations to a care plan. Examples of Individualization might be:  \"Parent requests to be called daily at 9am for status\", \"I have a hard time hearing out of my right ear\", or \"Do not touch me to wake me up as it startlesme\".  Outcome: Progressing  Goal: Absence of Hospital-Acquired Illness or Injury  Outcome: Progressing  Intervention: Identify and Manage Fall Risk  Recent Flowsheet Documentation  Taken 5/10/2025 0905 by Dimple Hunter, RN  Safety Promotion/Fall Prevention: safety round/check completed  Intervention: Prevent Skin Injury  Recent Flowsheet Documentation  Taken 5/10/2025 0905 by Dimple Hunter, RN  Body Position: position changed independently  Goal: Optimal Comfort and " Wellbeing  Outcome: Progressing  Goal: Readiness for Transition of Care  Outcome: Progressing     Problem: Infection  Goal: Absence of Infection Signs and Symptoms  Outcome: Progressing     Problem: Comorbidity Management  Goal: Blood Glucose Levels Within Targeted Range  Outcome: Progressing  Intervention: Monitor and Manage Glycemia  Recent Flowsheet Documentation  Taken 5/10/2025 0905 by Dimple Hunter, RN  Medication Review/Management: medications reviewed  Goal: Blood Pressure in Desired Range  Outcome: Progressing  Intervention: Maintain Blood Pressure Management  Recent Flowsheet Documentation  Taken 5/10/2025 0905 by Dimple Hunter, RN  Medication Review/Management: medications reviewed

## 2025-05-10 NOTE — PROGRESS NOTES
[facility]    Medicine Progress Note - Hospitalist Service    Date of Admission:  5/8/2025    Primary Care Physician   Miles Case  CONSULTANTS: ID    Assessment & Plan     Deni Coates is a 40 year old male who has a complex medical history including insulin-dependent type 2 diabetes with polyneuropathy, hypertension, anxiety depression, hyperlipidemia, previous osteomyelitis of the left toe, and previous stroke who is presenting with MSSA bacteremia.  As the story goes, the patient presented to the ER 5/6/2024 with fatigue, dizziness, flank tenderness, myalgias, dysuria, and fever.  He was found to be dehydrated with an elevated blood sugar and hyponatremia.  He was diagnosed with likely pyelonephritis and was sent home with doxycycline and Vantin to cover for both sexually-transmitted diseases as well as pyelonephritis.  His urine culture at that time was positive for MSSA and subsequently his blood cultures turn positive as well.  Patient was told to come back to the hospital due to the positive blood cultures     Today, patient is actually feeling much better than he did a couple days ago.  He did have some aches last night but otherwise his flank pain has resolved he has no fevers, denies any chills.  His dizziness has improved as well.  Patient's blood sugars still are uncontrolled.  In the emergency room he was found to have a white blood cell count of 9.7 which is improved from 13.3 a couple days ago.  His sodium is up to 132 from 126 a couple days ago.  His BUN 16, creatinine 0.55, he does have ketones in his urine still and his sugar is still elevated at 351.  His lactic acid is normal at 0.6 and his VBG was normal as well.  Patient was started on Ancef in the emergency room and is being admitted to the hospital.         MSSA bacteremia, pyelonephritis due to MSSA  Patient originally presented 5/6/2025 to the emergency room was diagnosed with pyelonephritis with an  abnormal urinalysis and a culture came back as Staph aureus.  Subsequently his blood cultures grew out MSSA as well.  He initially was sent home on Vantin/doxycycline and at this point would be placed on IV antibiotics. Infectious disease  consulted.  At this point, we do not have a source.  CT of the abdomen/pelvis did not show infectious source though it did show pyelonephritis but with MSSA this likel was from hematogenous spread. .  TTE echo negative for vegitations. There is no obvious murmurs however on exam to suggest endocarditis.  Patient does have a history of osteomyelitis of his toe in 2022.  I am not sure what his source is at this point.  May need to consider white blood cell tagged scan,  Repeat blood cultures been sent in the emergency room and if remain negative (from 5/8) likely will need to have PICC line placed for ongoing IV antibiotics at home.  Will await infectious diseases input.  Patient denies any IV drug use.  Likely the patient is already feeling better from 2 days ago his white blood cell count is down to 9.7 from 13.3 and does not appear to be septic. Is on ancef, consider once a day dosing medication for compliance at home.   Social work to look into insurance coverage for IV antibiotics. Will need to await culture results before placing a line, hopefully we can place a line soon.       Fatty liver, tiny left renal stone  Seen on CT    Dehydration, ketosis   Patient is quite dehydrated a couple days ago and he continues to have ketones in his urine.  Patient will be given IV fluids.     Type 2 insulin dependent diabetes   Patient's blood sugars have been out of control since having his infection but also a risk for infecton.  He presented with a blood sugar over 300.  Patient has a hemoglobin A1c of 11.6 as of 6/27/2024.  I suspect his blood sugar's have been even higher since then with his infection.  Patient to continue on Lantus and continues on insulin correction scale.  Not sure  why given the patient's age she is not taking prandial insulin or carb counting.  Hgb A1c high at 12.7.  patient to do carb counting and nursing will teach.  Place on high dose correction scale and increase his lantus dose.         Hyponatremia   Patient had a sodium down to 126 a couple days ago and its up to 133.  Will continue patient IV fluids and recheck in the morning.     Essential hypertension    Patient will continue on his home Coreg and Cozaar     Anxiety/depression   Patient continues on his home Prozac     Hyperlipidemia   Patient continues on Lipitor     History osteomyelitis   In 12/2022 the patient had a left toe osteomyelitis.  I am unable to find any records or culture showing how he was treated or what his cultures grew out.     History ischemic stroke  Patient continues on aspirin, statin, and a beta-blocker     Tobacco abuse  Patient continues to smoke at home.  Has not smoked for a few days he said does not need nicotine replacement.  Cessation was discussed at length with the patient.  Given his age and history of stroke he needs to be off of all nicotine.          Discussed plan of care with nursing, ID note pending today      Diet: Moderate Consistent Carb (60 g CHO per Meal) Diet    DVT Prophylaxis: Pneumatic Compression Devices  Márquez Catheter: Not present  Lines: None     Cardiac Monitoring: None    RESTRAINTS: not indicated  Code Status: Full Code        This document was created using voice recognition technology.  Please excuse any typographical errors that may have occurred.  Please call with any questions.          Clinically Significant Risk Factors          # Hyponatremia: Lowest Na = 132 mmol/L in last 2 days, will monitor as appropriate  # Hypochloremia: Lowest Cl = 96 mmol/L in last 2 days, will monitor as appropriate  # Hypocalcemia: Lowest Ca = 8.1 mg/dL in last 2 days, will monitor and replace as appropriate         # Hypertension: Noted on problem list           # DMII: A1C  "= 12.7 % (Ref range: <5.7 %) within past 6 months   # Overweight: Estimated body mass index is 28.46 kg/m  as calculated from the following:    Height as of this encounter: 1.905 m (6' 3\").    Weight as of this encounter: 103.3 kg (227 lb 11.2 oz)., PRESENT ON ADMISSION       # Financial/Environmental Concerns:           Disposition Plan     Expected Discharge Date: 05/12/2025                  Barrier to discharge: cultures, will need PICC/IV antibiotics  Medically Ready for Discharge: Anticipated in 2-4 Days       Pedro Bassett MD  Hospitalist Service    Securely message with [employer] (more info)  Text page via [employer] Paging/Directory   ______________________________________________________________________    Interval History   Patient doing well.  Work up for infection negative to date.  No new issues.  Denies fever, chills, sweats, myalgias, flank pain.  He feels fine and wanting to get home sooner than later      ROS: A comprehensive review of systems was negative except for items noted in the HPI.  Patient currently denies any fever, chills, sweats, nausea, vomiting, diarrhea, shortness of breath, or chest pain.       Physical Exam   Vital Signs: Temp: 98.2  F (36.8  C) Temp src: Oral BP: 137/86 Pulse: 78   Resp: 20 SpO2: 97 % O2 Device: None (Room air)    Weight: 227 lbs 11.2 oz      Exam not changed  General appearance: Patient is alert and oriented x3, no apparent distress, pleasant and conversing normally, speaking in full sentences, appears stated age, lying in bed  HEENT:   Mucous membranes are moist  EXTREMITIES:  Moves all extremities, no clubbing, cyanosis, nor edema, no signs of cellulitis   :  Márquez ot present         Data         Imaging:   Results for orders placed or performed during the hospital encounter of 05/08/25   CT Abdomen Pelvis w Contrast    Narrative    EXAM: CT ABDOMEN PELVIS W CONTRAST  LOCATION: [facility]  DATE: " 2025    INDICATION: High grade staph aureus bacteremia, assess for additional focus of infection.    COMPARISON: CT 2012.    TECHNIQUE: CT scan of the abdomen and pelvis was performed following injection of intravenous contrast. Multiplanar reformats were obtained. Dose reduction techniques were used.    CONTRAST: 100 mL Isovue 370.    FINDINGS:   LOWER CHEST: Normal.    HEPATOBILIARY: There may be mild fatty liver. No acute liver or gallbladder abnormality. No calcified gallstones.    PANCREAS: Normal.    SPLEEN: Normal.    ADRENAL GLANDS: Normal.    KIDNEYS/BLADDER: There are multifocal bilateral regions of parenchymal hypoenhancement that appear ill-defined. One of the largest is along the anterior right mid to low kidney measuring 2 cm series 3, image 96. Other examples seen at the lower right   kidney and lower left kidney on image 116. No hydronephrosis. Tiny 1 mm nonobstructing stone mid left kidney series 3, image 82. The bladder appears unremarkable.    BOWEL: Normal appendix. No acute inflammatory change of the bowel. No bowel obstruction. Moderate stool. No free fluid or free air. No abscess identified.    LYMPH NODES: Normal.    VASCULATURE: Mild calcifications.    PELVIC ORGANS: No acute abnormality.    MUSCULOSKELETAL: No acute abnormality.      Impression    IMPRESSION:   1.  Bilateral patchy areas of focal hypoenhancement involving each kidney. This pattern is consistent with bilateral pyelonephritis. Recommend follow-up CT or MRI of the kidneys to ensure resolution.  2.  Tiny nonobstructing stone at the left kidney.  3.  Suggestion of fatty liver.     Echocardiogram Complete     Value    LVEF  55-60%    Narrative    [id number]  [id number]  [id number]  [id number]^JERRY^BREANNA^ALONZO     [facility]  Echocardiography Laboratory  [address] [place] [address]     Name: MIKE HOFFMANN  MRN: [id number]  : 1985  Study Date: 2025 02:44 PM  Age: 40 yrs  Gender:  Male  Patient Location: [facility]  Reason For Study: Endocarditis  Ordering Physician: BREANNA EDWARDS  Performed By: Ayala Lowery     BSA: 2.3 m2  Height: 76 in  Weight: 228 lb  BP: 124/91 mmHg  ______________________________________________________________________________  Procedure  Echocardiogram with two-dimensional, color and spectral Doppler.  ______________________________________________________________________________  Interpretation Summary     The visual ejection fraction is 55-60%.  Left ventricular systolic function is normal.  There is trace aortic regurgitation.  There is no evidence of a mass or vegetation. This does not rule out  endocarditis.  ______________________________________________________________________________  Left Ventricle  The left ventricle is normal in size. There is normal left ventricular wall  thickness. Diastolic Doppler findings (E/E' ratio and/or other parameters)  suggest left ventricular filling pressures are indeterminate. The visual  ejection fraction is 55-60%. Left ventricular systolic function is normal.     Right Ventricle  The right ventricle is normal in size and function.     Atria  Normal left atrial size. Right atrial size is normal. There is no color  Doppler evidence of an atrial shunt.     Mitral Valve  There is trace mitral regurgitation.     Tricuspid Valve  There is trace tricuspid regurgitation.     Aortic Valve  The aortic valve is trileaflet. There is trace aortic regurgitation. No  hemodynamically significant valvular aortic stenosis.     Pulmonic Valve  There is no pulmonic valvular regurgitation. Normal pulmonic valve velocity.     Vessels  The aortic root is normal size. Normal size ascending aorta. IVC diameter <2.1  cm collapsing >50% with sniff suggests a normal RA pressure of 3 mmHg.     Pericardium  There is no pericardial effusion.     Rhythm  Sinus rhythm was  noted.  ______________________________________________________________________________  MMode/2D Measurements & Calculations  IVSd: 0.99 cm  LVIDd: 5.1 cm  LVIDs: 3.2 cm  LVPWd: 0.82 cm  FS: 37.8 %  LV mass(C)d: 168.1 grams  LV mass(C)dI: 71.8 grams/m2  Ao root diam: 3.9 cm  asc Aorta Diam: 3.2 cm  LVOT diam: 2.6 cm  LVOT area: 5.3 cm2  Ao root diam index Ht(cm/m): 2.0  Ao root diam index BSA (cm/m2): 1.7  Asc Ao diam index BSA (cm/m2): 1.4  Asc Ao diam index Ht(cm/m): 1.6  LA Volume (BP): 55.5 ml     LA Volume Index (BP): 23.7 ml/m2  RWT: 0.32  TAPSE: 2.2 cm     Doppler Measurements & Calculations  MV E max oly: 66.2 cm/sec  MV A max loy: 58.8 cm/sec  MV E/A: 1.1  MV dec time: 0.20 sec  PA acc time: 0.14 sec  E/E' av.3  Lateral E/e': 5.8  Medial E/e': 8.8     ______________________________________________________________________________  Report approved by: Ryan Pizano MD on 2025 04:15 PM           Procedures: CT abdomen/pelvis pending per ID  I have personally have reviewed the patient's most up to date radiologic exams,echo, labs, orders, and medications myself

## 2025-05-10 NOTE — PLAN OF CARE
"Goal Outcome Evaluation:    VSS, on scheduled BP meds. Denies pain. LR infusing per orders. On IV ancef. ID following. Bg elevated, on sliding scale, carb coverage, and lantus. Independent with mobility.      Plan of Care Reviewed With: patient    Overall Patient Progress: improving    Outcome Evaluation: Continues on IV ancef. Elevated Bg, on sliding scale, carb coverage, and lantus.    Problem: Adult Inpatient Plan of Care  Goal: Plan of Care Review  Description: The Plan of Care Review/Shift note should be completed every shift.  The Outcome Evaluation is a brief statement about your assessment that the patient is improving, declining, or no change.  This information will be displayed automatically on your shiftnote.  Outcome: Progressing  Flowsheets (Taken 5/9/2025 2126)  Outcome Evaluation: Continues on IV ancef. Elevated Bg, on sliding scale, carb coverage, and lantus.  Plan of Care Reviewed With: patient  Overall Patient Progress: improving  Goal: Patient-Specific Goal (Individualized)  Description: You can add care plan individualizations to a care plan. Examples of Individualization might be:  \"Parent requests to be called daily at 9am for status\", \"I have a hard time hearing out of my right ear\", or \"Do not touch me to wake me up as it startlesme\".  Outcome: Progressing  Goal: Absence of Hospital-Acquired Illness or Injury  Outcome: Progressing  Intervention: Identify and Manage Fall Risk  Recent Flowsheet Documentation  Taken 5/9/2025 1715 by Rosie Peraza, RN  Safety Promotion/Fall Prevention:   clutter free environment maintained   lighting adjusted   nonskid shoes/slippers when out of bed   safety round/check completed  Intervention: Prevent Skin Injury  Recent Flowsheet Documentation  Taken 5/9/2025 1715 by Rosie Peraza, RN  Body Position: position changed independently  Intervention: Prevent and Manage VTE (Venous Thromboembolism) Risk  Recent Flowsheet Documentation  Taken 5/9/2025 1715 by " Rosie Peraza, RN  VTE Prevention/Management: (ambulatory)   SCDs off (sequential compression devices)   other (see comments)  Intervention: Prevent Infection  Recent Flowsheet Documentation  Taken 5/9/2025 1715 by Rosie Peraza, RN  Infection Prevention:   single patient room provided   rest/sleep promoted   equipment surfaces disinfected  Goal: Optimal Comfort and Wellbeing  Outcome: Progressing  Goal: Readiness for Transition of Care  Outcome: Progressing     Problem: Infection  Goal: Absence of Infection Signs and Symptoms  Outcome: Progressing     Problem: Comorbidity Management  Goal: Blood Glucose Levels Within Targeted Range  Outcome: Progressing  Intervention: Monitor and Manage Glycemia  Recent Flowsheet Documentation  Taken 5/9/2025 1715 by Rosie Peraza, RN  Medication Review/Management: medications reviewed  Goal: Blood Pressure in Desired Range  Outcome: Progressing  Intervention: Maintain Blood Pressure Management  Recent Flowsheet Documentation  Taken 5/9/2025 1715 by Rosie Peraza, RN  Medication Review/Management: medications reviewed

## 2025-05-10 NOTE — CONSULTS
Care Management Initial Consult    General Information  Assessment completed with: Patient, Spouse or significant other,    Type of CM/SW Visit: Initial Assessment    Primary Care Provider verified and updated as needed:     Readmission within the last 30 days: no previous admission in last 30 days      Reason for Consult: discharge planning  Advance Care Planning: Advance Care Planning Reviewed: no concerns identified        Communication Assessment  Patient's communication style: spoken language (English or Bilingual)    Hearing Difficulty or Deaf: no   Wear Glasses or Blind: yes    Cognitive  Cognitive/Neuro/Behavioral: WDL                      Living Environment:   People in home: spouse     Current living Arrangements: apartment      Able to return to prior arrangements: yes     Family/Social Support:  Care provided by: self  Provides care for:    Marital Status:   Support system: Wife          Description of Support System: Supportive, Involved       Current Resources:   Patient receiving home care services: No     Community Resources: None  Equipment currently used at home: none  Supplies currently used at home: None    Employment/Financial:  Employment Status:          Financial Concerns: insurance, none      Lifestyle & Psychosocial Needs:  Social Drivers of Health     Food Insecurity: Low Risk  (5/8/2025)    Food Insecurity     Within the past 12 months, did you worry that your food would run out before you got money to buy more?: No     Within the past 12 months, did the food you bought just not last and you didn t have money to get more?: No   Depression: Not at risk (7/2/2024)    PHQ-2     PHQ-2 Score: 2   Housing Stability: Low Risk  (5/8/2025)    Housing Stability     Do you have housing? : Yes     Are you worried about losing your housing?: No   Tobacco Use: Unknown (10/28/2024)    Patient History     Smoking Tobacco Use: Never     Smokeless Tobacco Use: Unknown     Passive Exposure: Not on  "file   Financial Resource Strain: Low Risk  (5/8/2025)    Financial Resource Strain     Within the past 12 months, have you or your family members you live with been unable to get utilities (heat, electricity) when it was really needed?: No   Alcohol Use: Not At Risk (12/16/2022)    Received from [facility] and [facility]    AUDIT-C     Frequency of Alcohol Consumption: Never     Average Number of Drinks: 1 or 2     Frequency of Binge Drinking: Never   Transportation Needs: Low Risk  (5/8/2025)    Transportation Needs     Within the past 12 months, has lack of transportation kept you from medical appointments, getting your medicines, non-medical meetings or appointments, work, or from getting things that you need?: No   Physical Activity: Not on file   Interpersonal Safety: Low Risk  (5/8/2025)    Interpersonal Safety     Do you feel physically and emotionally safe where you currently live?: Yes     Within the past 12 months, have you been hit, slapped, kicked or otherwise physically hurt by someone?: No     Within the past 12 months, have you been humiliated or emotionally abused in other ways by your partner or ex-partner?: No   Stress: Not on file   Social Connections: Not on file   Health Literacy: Not on file       Functional Status:  Prior to admission patient needed assistance:   Dependent ADLs:: Independent  Dependent IADLs:: Independent     Discussed  Partnership in Safe Discharge Planning  document with patient/family: No    Additional Information:  Care management consult for coordination of home infusion for IV antibiotics. Patient admitted with MSSA bacteremia. Followed by infectious disease and will need 4 week course of Cefazolin 2gm every 8 hours.    Per chart review noted patient is uninsured. Financial counseling has been involved and it appears patient has had MA recently. Asked patient if he has MA or not and patient stated it is \"pending\" that he needs to submit dependent information. "     Discussed that benefit check will be sent to [facility] today. Likely he will be private pay until his MA is active but will follow up on that. Will also contact [facility] for a benefit check. Unsure we will get benefit check back before Monday. Patient verbalizes understanding.     Care coordination to follow for discharge with home infusion.    Next Steps: follow up on benefit check with [facility], contact [facility] for benefit check (branch closed over weekend except for urgent needs)    Sumaya Leon RN  Care Coordinator  [facility]

## 2025-05-11 LAB
GLUCOSE BLDC GLUCOMTR-MCNC: 243 MG/DL (ref 70–99)
GLUCOSE BLDC GLUCOMTR-MCNC: 255 MG/DL (ref 70–99)
GLUCOSE BLDC GLUCOMTR-MCNC: 282 MG/DL (ref 70–99)
GLUCOSE BLDC GLUCOMTR-MCNC: 283 MG/DL (ref 70–99)
GLUCOSE BLDC GLUCOMTR-MCNC: 324 MG/DL (ref 70–99)

## 2025-05-11 PROCEDURE — 99232 SBSQ HOSP IP/OBS MODERATE 35: CPT | Performed by: INTERNAL MEDICINE

## 2025-05-11 PROCEDURE — 120N000001 HC R&B MED SURG/OB

## 2025-05-11 PROCEDURE — 250N000011 HC RX IP 250 OP 636: Performed by: SPECIALIST

## 2025-05-11 PROCEDURE — 258N000003 HC RX IP 258 OP 636: Performed by: INTERNAL MEDICINE

## 2025-05-11 PROCEDURE — 250N000013 HC RX MED GY IP 250 OP 250 PS 637: Performed by: INTERNAL MEDICINE

## 2025-05-11 RX ORDER — LIDOCAINE 40 MG/G
CREAM TOPICAL
Status: DISCONTINUED | OUTPATIENT
Start: 2025-05-11 | End: 2025-05-12 | Stop reason: HOSPADM

## 2025-05-11 RX ORDER — INSULIN GLARGINE 100 [IU]/ML
25 INJECTION, SOLUTION SUBCUTANEOUS AT BEDTIME
Qty: 15 ML | Refills: 3 | Status: SHIPPED | OUTPATIENT
Start: 2025-05-11 | End: 2025-05-12

## 2025-05-11 RX ADMIN — CEFAZOLIN SODIUM 2 G: 2 SOLUTION INTRAVENOUS at 09:09

## 2025-05-11 RX ADMIN — ATORVASTATIN CALCIUM 40 MG: 40 TABLET, FILM COATED ORAL at 09:09

## 2025-05-11 RX ADMIN — CARVEDILOL 6.25 MG: 6.25 TABLET, FILM COATED ORAL at 09:09

## 2025-05-11 RX ADMIN — CARVEDILOL 6.25 MG: 6.25 TABLET, FILM COATED ORAL at 21:49

## 2025-05-11 RX ADMIN — SODIUM CHLORIDE, SODIUM LACTATE, POTASSIUM CHLORIDE, AND CALCIUM CHLORIDE: .6; .31; .03; .02 INJECTION, SOLUTION INTRAVENOUS at 00:14

## 2025-05-11 RX ADMIN — ASPIRIN 325 MG: 325 TABLET, COATED ORAL at 09:09

## 2025-05-11 RX ADMIN — CEFAZOLIN SODIUM 2 G: 2 SOLUTION INTRAVENOUS at 18:26

## 2025-05-11 RX ADMIN — CEFAZOLIN SODIUM 2 G: 2 SOLUTION INTRAVENOUS at 00:13

## 2025-05-11 ASSESSMENT — ACTIVITIES OF DAILY LIVING (ADL)
ADLS_ACUITY_SCORE: 29

## 2025-05-11 NOTE — PROGRESS NOTES
Care Management Follow Up    Length of Stay (days): 3    Expected Discharge Date: 05/12/2025     Concerns to be Addressed: discharge planning     Patient plan of care discussed at interdisciplinary rounds: Yes    Anticipated Discharge Disposition: Home Infusion     Anticipated Discharge Services:  home infusion  Anticipated Discharge DME: None    Patient/family educated on Medicare website which has current facility and service quality ratings:    Education Provided on the Discharge Plan:    Patient/Family in Agreement with the Plan: yes    Referrals Placed by CM/SW: Home Infusion  Private pay costs discussed: insurance costs out of pocket expenses    Discussed  Partnership in Safe Discharge Planning  document with patient/family: No     Handoff Completed: No, handoff not indicated or clinically appropriate    Additional Information:  Did receive benefit check back from [facility]. Due to patient not having active insurance they gave a self pay cost of $4864.07 for 30 days of medication, supplies and one nurse visit (however patient would need one per week at $90 per visit).  They would want patient to agree to the private pay cost in case his MA did not come through.     Reviewed this with patient. Will get price from [facility] as well. Asked patient if he would be able to follow up with the [facility] as soon as possible to get them the needed information for his MA. He stated he will call [place] county worker first thing in the morning.     Care management to follow up with benefit check to [facility] and with patient after he contacts [facility] worker to get his MA as pending or active.     Next Steps: [employer] benefit check    Sumaya Leon RN  Care Coordinator  [facility]

## 2025-05-11 NOTE — PLAN OF CARE
"Pt. A&Ox4, up independently, IVF infusing without difficulty, HS VTH=019, 0200 YJF=101, Pt. Denies any complaints of pain. Plan for PICC line placement and then discharge with Abx. Pt. Denies any needs at this time. Will continue with POC.    Goal Outcome Evaluation:      Plan of Care Reviewed With: patient    Overall Patient Progress: no changeOverall Patient Progress: no change    Outcome Evaluation: Pt. continues on Abx (ancef), IVF and monitoring blood sugars      Problem: Adult Inpatient Plan of Care  Goal: Plan of Care Review  Description: The Plan of Care Review/Shift note should be completed every shift.  The Outcome Evaluation is a brief statement about your assessment that the patient is improving, declining, or no change.  This information will be displayed automatically on your shiftnote.  Outcome: Progressing  Flowsheets (Taken 5/11/2025 0611)  Outcome Evaluation: Pt. continues on Abx (ancef), IVF and monitoring blood sugars  Plan of Care Reviewed With: patient  Overall Patient Progress: no change  Goal: Patient-Specific Goal (Individualized)  Description: You can add care plan individualizations to a care plan. Examples of Individualization might be:  \"Parent requests to be called daily at 9am for status\", \"I have a hard time hearing out of my right ear\", or \"Do not touch me to wake me up as it startlesme\".  Outcome: Progressing  Goal: Absence of Hospital-Acquired Illness or Injury  Outcome: Progressing  Intervention: Identify and Manage Fall Risk  Recent Flowsheet Documentation  Taken 5/11/2025 0030 by Sigrid Araiza RN  Safety Promotion/Fall Prevention:   activity supervised   safety round/check completed   increased rounding and observation   clutter free environment maintained   increase visualization of patient   lighting adjusted   nonskid shoes/slippers when out of bed  Intervention: Prevent Skin Injury  Recent Flowsheet Documentation  Taken 5/11/2025 0030 by Sigrid Araiza RN  Body " Position: position changed independently  Intervention: Prevent and Manage VTE (Venous Thromboembolism) Risk  Recent Flowsheet Documentation  Taken 5/11/2025 0030 by Sigrid Araiza RN  VTE Prevention/Management: SCDs off (sequential compression devices)  Intervention: Prevent Infection  Recent Flowsheet Documentation  Taken 5/11/2025 0030 by Sigrid Araiza RN  Infection Prevention: hand hygiene promoted  Goal: Optimal Comfort and Wellbeing  Outcome: Progressing  Goal: Readiness for Transition of Care  Outcome: Progressing     Problem: Infection  Goal: Absence of Infection Signs and Symptoms  Outcome: Progressing     Problem: Comorbidity Management  Goal: Blood Glucose Levels Within Targeted Range  Outcome: Progressing  Intervention: Monitor and Manage Glycemia  Recent Flowsheet Documentation  Taken 5/11/2025 0030 by Sigrid Araiza RN  Medication Review/Management: medications reviewed  Goal: Blood Pressure in Desired Range  Outcome: Progressing  Intervention: Maintain Blood Pressure Management  Recent Flowsheet Documentation  Taken 5/11/2025 0030 by Sigrid Araiza RN  Medication Review/Management: medications reviewed

## 2025-05-11 NOTE — PLAN OF CARE
"16:10 RN shift summary 7-3:  No c/o pain.  Alert and oriented.  Ambulates independently in room.  Lungs clear. 99% RA.  No tele.  Mod carb diet.  PIV saline locked.  Continue IV antibiotics, blood sugar checks/insulin per orders. Plan to discharge home with long term IV antibiotics once a midline or picc line is placed and insurance/MA coverage in force. SW and I.D. following.     16:37 Text page sent to update MD : spoke with  and there is still work to be done with Medical Assistance / finances and getting the home infusion services set up. Pt will not be discharging today.          Goal Outcome Evaluation:      Plan of Care Reviewed With: patient    Overall Patient Progress: improvingOverall Patient Progress: improving    Outcome Evaluation: Conitnue IV antibiotics.  Plan for placement of PICC line and long-term antibiotics at discharge.  ID following.      Problem: Adult Inpatient Plan of Care  Goal: Plan of Care Review  Description: The Plan of Care Review/Shift note should be completed every shift.  The Outcome Evaluation is a brief statement about your assessment that the patient is improving, declining, or no change.  This information will be displayed automatically on your shiftnote.  Outcome: Progressing  Flowsheets (Taken 5/11/2025 1230)  Outcome Evaluation: Conitnue IV antibiotics.  Plan for placement of PICC line and long-term antibiotics at discharge.  ID following.  Plan of Care Reviewed With: patient  Overall Patient Progress: improving  Goal: Patient-Specific Goal (Individualized)  Description: You can add care plan individualizations to a care plan. Examples of Individualization might be:  \"Parent requests to be called daily at 9am for status\", \"I have a hard time hearing out of my right ear\", or \"Do not touch me to wake me up as it startlesme\".  Outcome: Progressing  Goal: Absence of Hospital-Acquired Illness or Injury  Outcome: Progressing  Intervention: Identify and Manage Fall " Risk  Recent Flowsheet Documentation  Taken 5/11/2025 0910 by Dimple Hunter, RN  Safety Promotion/Fall Prevention: safety round/check completed  Intervention: Prevent Skin Injury  Recent Flowsheet Documentation  Taken 5/11/2025 0910 by Dimple Hunter RN  Body Position: position changed independently  Goal: Optimal Comfort and Wellbeing  Outcome: Progressing  Goal: Readiness for Transition of Care  Outcome: Progressing     Problem: Infection  Goal: Absence of Infection Signs and Symptoms  Outcome: Progressing     Problem: Comorbidity Management  Goal: Blood Glucose Levels Within Targeted Range  Outcome: Progressing  Intervention: Monitor and Manage Glycemia  Recent Flowsheet Documentation  Taken 5/11/2025 0910 by Dimple Hunter, RN  Medication Review/Management: medications reviewed  Goal: Blood Pressure in Desired Range  Outcome: Progressing  Intervention: Maintain Blood Pressure Management  Recent Flowsheet Documentation  Taken 5/11/2025 0910 by Dimple Hunter, RN  Medication Review/Management: medications reviewed

## 2025-05-11 NOTE — PROGRESS NOTES
[facility]  Infectious Disease Progress Note          Assessment and Plan:   Date of Admission:  5/8/2025  Date of Consult (When I saw the patient): 05/09/25     Assessment:  40YM with uncontrolled diabetes complicated by polyneuropathy, who has been admitted due to fever, fatigue, dysuria and flank pain and has been found to have high grade staph aureus MSSA bacteremia with hematogenous seeding of the kidneys with pyelonephritis. No obvious abscess formation. Given extremely poor glycemic control, he remains at risk for further complciations     -High grade MSSA bacteremia, unclear source. Staph aureus is not a urinary pathogen and positive urine cx is suggestive of hematogenous spread  -Uncontrolled diabetes (prior HbA1c of > 11%)  -Hyponatremia  -Hx of osteomyelitis  -Chronic medical conditions- Diabetes, Hyperlipidemia, HTN, anxiety and depression, hx of CVA, tobacco abuse     Recommendations:  Follow blood cxs for clearance appears to have cleared, May 8 culture still negative   HbA1c note poorly controlled diabetes  CT abdomen pelvis to assess for additional focus of infection negative  Discontinue Ceftriaxone  Treat with Cefazolin plan  full 4-week course   Social service check on IV coverage, unfortunately has no insurance so now MA pending, may be able to go on that basis, care coordinators are following, midline placement        Interval History:     no new complaints and doing well; no cp, sob, n/v/d, or abd pain.  No pain site, no ongoing fever, blood cultures remain negative and follow-up no obvious secondary sites              Medications:     Current Facility-Administered Medications   Medication Dose Route Frequency Provider Last Rate Last Admin    aspirin (ASA) EC tablet 325 mg  325 mg Oral Daily Pedro Bassett MD   325 mg at 05/11/25 0909    atorvastatin (LIPITOR) tablet 40 mg  40 mg Oral Daily Pedro Bassett MD   40 mg at 05/11/25 0909    carvedilol (COREG)  "tablet 6.25 mg  6.25 mg Oral BID w/meals Pedro Bassett MD   6.25 mg at 05/11/25 0909    ceFAZolin (ANCEF) 2 g in dextrose 50 mL intermittent infusion  2 g Intravenous Q8H Jo Yancey  mL/hr at 05/11/25 0013 2 g at 05/11/25 0909    FLUoxetine (PROzac) capsule 40 mg  40 mg Oral Daily Pedro Bassett MD        insulin aspart (NovoLOG) injection (RAPID ACTING)  1-10 Units Subcutaneous TID AC Pedro Bassett MD   5 Units at 05/11/25 0913    insulin aspart (NovoLOG) injection (RAPID ACTING)  1-7 Units Subcutaneous At Bedtime Pedro Bassett MD        insulin aspart (NovoLOG) injection (RAPID ACTING)   Subcutaneous TID w/meals Pedro Bassett MD   2 Units at 05/11/25 0914    insulin glargine (LANTUS PEN) injection 25 Units  25 Units Subcutaneous At Bedtime Pedro Bassett MD   25 Units at 05/10/25 2154    losartan (COZAAR) tablet 50 mg  50 mg Oral QPM Pedro Bassett MD        oxymetazoline (AFRIN) 0.05 % spray 2 spray  2 spray Both Nostrils BID Pedro Bassett MD        sodium chloride (PF) 0.9% PF flush 3 mL  3 mL Intracatheter Q8H ADÁN Pedro Bassett MD   3 mL at 05/10/25 1708                  Physical Exam:   Blood pressure (!) 142/95, pulse 71, temperature 98.1  F (36.7  C), temperature source Oral, resp. rate 16, height 1.905 m (6' 3\"), weight 103.3 kg (227 lb 11.2 oz), SpO2 99%.  Wt Readings from Last 2 Encounters:   05/08/25 103.3 kg (227 lb 11.2 oz)   05/06/25 106.9 kg (235 lb 10.8 oz)     Vital Signs with Ranges  Temp:  [98.1  F (36.7  C)-98.5  F (36.9  C)] 98.1  F (36.7  C)  Pulse:  [65-75] 71  Resp:  [16-18] 16  BP: (132-142)/(81-95) 142/95  SpO2:  [95 %-99 %] 99 %    Constitutional: Awake, alert, cooperative, no apparent distress     Lungs: Clear to auscultation bilaterally, no crackles or wheezing   Cardiovascular: Regular rate and rhythm, normal S1 and S2, and no murmur noted   Abdomen: Normal bowel sounds, soft, non-distended, non-tender   Skin: No " "rashes, no cyanosis, no edema   Other:           Data:   All microbiology laboratory data reviewed.  Recent Labs   Lab Test 05/09/25  0700 05/08/25  1255 05/06/25  1153   WBC 7.8 9.7 13.3*   HGB 13.2* 14.2 14.5   HCT 38.3* 41.0 41.6   MCV 86 85 86    349 329     Recent Labs   Lab Test 05/09/25  0700 05/08/25  1255 05/06/25  1153   CR 0.53* 0.55* 0.63*     No lab results found.  No lab results found.    Invalid input(s): \"UC\"     "

## 2025-05-11 NOTE — DISCHARGE SUMMARY
[facility]  Hospitalist Discharge Summary      Date of Admission:  5/8/2025  Date of Discharge:  5/12/2025 home when finances figured out  Discharging Provider: Pedro Bassett MD  Discharge Service: Hospitalist Service  Primary Care Physician   Miles Case    Discharge Diagnoses   MSSA bacteremia  Pyelonephritis due to MSSA  Fatty liver  Left renal stone  Ketosis  Dehydration  Hyponatremia  Essential hypertension  Anxiety/depression  Hyperlipidemia  History of osteomyelitis  History of stroke  Tobacco abuse  No insurance      Hospital Course       Deni Coates is a 40 year old male who has a complex medical history including insulin-dependent type 2 diabetes with polyneuropathy, hypertension, anxiety depression, hyperlipidemia, previous osteomyelitis of the left toe, and previous stroke who is presenting with MSSA bacteremia.  As the story goes, the patient presented to the ER 5/6/2024 with fatigue, dizziness, flank tenderness, myalgias, dysuria, and fever.  He was found to be dehydrated with an elevated blood sugar and hyponatremia.  He was diagnosed with likely pyelonephritis and was sent home with doxycycline and Vantin to cover for both sexually-transmitted diseases as well as pyelonephritis.  His urine culture at that time was positive for MSSA and subsequently his blood cultures turn positive as well.  Patient was told to come back to the hospital due to the positive blood cultures     Today, patient is actually feeling much better than he did a couple days ago.  He did have some aches last night but otherwise his flank pain has resolved he has no fevers, denies any chills.  His dizziness has improved as well.  Patient's blood sugars still are uncontrolled.  In the emergency room he was found to have a white blood cell count of 9.7 which is improved from 13.3 a couple days ago.  His sodium is up to 132 from 126 a couple days ago.  His BUN 16, creatinine 0.55, he does have  ketones in his urine still and his sugar is still elevated at 351.  His lactic acid is normal at 0.6 and his VBG was normal as well.  Patient was started on Ancef in the emergency room and is being admitted to the hospital.         MSSA bacteremia, pyelonephritis due to MSSA  Patient originally presented 5/6/2025 to the emergency room was diagnosed with pyelonephritis with an abnormal urinalysis and a culture came back as Staph aureus.  Subsequently his blood cultures grew out MSSA as well.  He initially was sent home on Vantin/doxycycline and at this point would be placed on IV antibiotics. Infectious disease  consulted.  At this point, we do not have a source.  CT of the abdomen/pelvis did not show infectious source though it did show pyelonephritis but with MSSA this likel was from hematogenous spread. .  TTE echo negative for vegitations. There is no obvious murmurs however on exam to suggest endocarditis.  Patient does have a history of osteomyelitis of his toe in 2022.  I am not sure what his source is at this point.  May need to consider white blood cell tagged scan,  Repeat blood cultures been sent in the emergency room and if remain negative (from 5/8) likely will need to have PICC line placed for ongoing IV antibiotics at home.  Will await infectious diseases input.  Patient denies any IV drug use.  Likely the patient is already feeling better from 2 days ago his white blood cell count is down to 9.7 from 13.3 and does not appear to be septic. Is on ancef, consider once a day dosing medication for compliance at home.   Patient does not have insurance coverage for IV antibiotics. And is paying out of pocket.    Will go home on ancef for 4 weeks.  He got a single lumen PICC line prior to discharge 5/11/25.  His repeat blood culture from 5/8/25 are negative to date.      Fatty liver, tiny left renal stone  Seen on CT     Dehydration, ketosis   Patient is quite dehydrated a couple days ago and he continues to  "have ketones in his urine.  Patient will be given IV fluids.     Type 2 insulin dependent diabetes   Patient's blood sugars have been out of control since having his infection but also a risk for infecton.  He presented with a blood sugar over 300.  Patient has a hemoglobin A1c of 11.6 as of 6/27/2024.  I suspect his blood sugar's have been even higher since then with his infection.  Patient to continue on Lantus and continues on insulin correction scale.  Not sure why given the patient's age he is not taking prandial insulin or carb counting.  Hgb A1c high at 12.7.  patient to do carb counting and nursing will teach.  Continue on a correction scale and lantus for home.  Complicating matters is he does not have insurance.        Hyponatremia   Patient had a sodium down to 126 a couple days ago and its up to 133.        Essential hypertension    Patient will continue on his home Coreg and Cozaar     Anxiety/depression   Patient continues on his home Prozac     Hyperlipidemia   Patient continues on Lipitor     History osteomyelitis   In 12/2022 the patient had a left toe osteomyelitis.  I am unable to find any records or culture showing how he was treated or what his cultures grew out.     History ischemic stroke  Patient continues on aspirin, statin, and a beta-blocker     Tobacco abuse  Patient continues to smoke at home.  Has not smoked for a few days he said does not need nicotine replacement.  Cessation was discussed at length with the patient.  Given his age and history of stroke he needs to be off of all nicotine.        Clinically Significant Risk Factors     # DMII: A1C = 12.7 % (Ref range: <5.7 %) within past 6 months  # Overweight: Estimated body mass index is 28.46 kg/m  as calculated from the following:    Height as of this encounter: 1.905 m (6' 3\").    Weight as of this encounter: 103.3 kg (227 lb 11.2 oz).       Significant Results and Procedures   Most Recent 3 CBC's:  Recent Labs   Lab Test " 05/09/25  0700 05/08/25  1255 05/06/25  1153   WBC 7.8 9.7 13.3*   HGB 13.2* 14.2 14.5   MCV 86 85 86    349 329     Most Recent 3 BMP's:  Recent Labs   Lab Test 05/11/25  0200 05/10/25  2116 05/10/25  1645 05/09/25  0831 05/09/25  0700 05/08/25  1712 05/08/25  1255 05/06/25  1547 05/06/25  1153   NA  --   --   --   --  133*  --  132*  --  126*   POTASSIUM  --   --   --   --  3.9  --  4.1  --  3.7   CHLORIDE  --   --   --   --  101  --  96*  --  93*   CO2  --   --   --   --  20*  --  20*  --  19*   BUN  --   --   --   --  12.8  --  16.8  --  10.6   CR  --   --   --   --  0.53*  --  0.55*  --  0.63*   ANIONGAP  --   --   --   --  12  --  16*  --  14   XAVIER  --   --   --   --  8.1*  --  8.9  --  8.4*   * 186* 317*   < > 343*   < > 351*   < > 322*    < > = values in this interval not displayed.     Most Recent 6 glucoses:  Recent Labs   Lab Test 05/11/25  0200 05/10/25  2116 05/10/25  1645 05/10/25  1237 05/10/25  0840 05/10/25  0205   * 186* 317* 310* 248* 305*   ,   Results for orders placed or performed during the hospital encounter of 05/08/25   CT Abdomen Pelvis w Contrast    Narrative    EXAM: CT ABDOMEN PELVIS W CONTRAST  LOCATION: [facility]  DATE: 5/9/2025    INDICATION: High grade staph aureus bacteremia, assess for additional focus of infection.    COMPARISON: CT 2/28/2012.    TECHNIQUE: CT scan of the abdomen and pelvis was performed following injection of intravenous contrast. Multiplanar reformats were obtained. Dose reduction techniques were used.    CONTRAST: 100 mL Isovue 370.    FINDINGS:   LOWER CHEST: Normal.    HEPATOBILIARY: There may be mild fatty liver. No acute liver or gallbladder abnormality. No calcified gallstones.    PANCREAS: Normal.    SPLEEN: Normal.    ADRENAL GLANDS: Normal.    KIDNEYS/BLADDER: There are multifocal bilateral regions of parenchymal hypoenhancement that appear ill-defined. One of the largest is along the anterior right mid to  low kidney measuring 2 cm series 3, image 96. Other examples seen at the lower right   kidney and lower left kidney on image 116. No hydronephrosis. Tiny 1 mm nonobstructing stone mid left kidney series 3, image 82. The bladder appears unremarkable.    BOWEL: Normal appendix. No acute inflammatory change of the bowel. No bowel obstruction. Moderate stool. No free fluid or free air. No abscess identified.    LYMPH NODES: Normal.    VASCULATURE: Mild calcifications.    PELVIC ORGANS: No acute abnormality.    MUSCULOSKELETAL: No acute abnormality.      Impression    IMPRESSION:   1.  Bilateral patchy areas of focal hypoenhancement involving each kidney. This pattern is consistent with bilateral pyelonephritis. Recommend follow-up CT or MRI of the kidneys to ensure resolution.  2.  Tiny nonobstructing stone at the left kidney.  3.  Suggestion of fatty liver.     Echocardiogram Complete     Value    LVEF  55-60%    Narrative    [id number]  [id number]  [id number]  [id number]^JERRY^BREANNA^T     [facility]  Echocardiography Laboratory  [address] [place] [address]     Name: MIKE HOFFMANN  MRN: [id number]  : 1985  Study Date: 2025 02:44 PM  Age: 40 yrs  Gender: Male  Patient Location: [facility]  Reason For Study: Endocarditis  Ordering Physician: BREANNA EDWARDS  Performed By: Ayala Lowery     BSA: 2.3 m2  Height: 76 in  Weight: 228 lb  BP: 124/91 mmHg  ______________________________________________________________________________  Procedure  Echocardiogram with two-dimensional, color and spectral Doppler.  ______________________________________________________________________________  Interpretation Summary     The visual ejection fraction is 55-60%.  Left ventricular systolic function is normal.  There is trace aortic regurgitation.  There is no evidence of a mass or vegetation. This does not rule  out  endocarditis.  ______________________________________________________________________________  Left Ventricle  The left ventricle is normal in size. There is normal left ventricular wall  thickness. Diastolic Doppler findings (E/E' ratio and/or other parameters)  suggest left ventricular filling pressures are indeterminate. The visual  ejection fraction is 55-60%. Left ventricular systolic function is normal.     Right Ventricle  The right ventricle is normal in size and function.     Atria  Normal left atrial size. Right atrial size is normal. There is no color  Doppler evidence of an atrial shunt.     Mitral Valve  There is trace mitral regurgitation.     Tricuspid Valve  There is trace tricuspid regurgitation.     Aortic Valve  The aortic valve is trileaflet. There is trace aortic regurgitation. No  hemodynamically significant valvular aortic stenosis.     Pulmonic Valve  There is no pulmonic valvular regurgitation. Normal pulmonic valve velocity.     Vessels  The aortic root is normal size. Normal size ascending aorta. IVC diameter <2.1  cm collapsing >50% with sniff suggests a normal RA pressure of 3 mmHg.     Pericardium  There is no pericardial effusion.     Rhythm  Sinus rhythm was noted.  ______________________________________________________________________________  MMode/2D Measurements & Calculations  IVSd: 0.99 cm  LVIDd: 5.1 cm  LVIDs: 3.2 cm  LVPWd: 0.82 cm  FS: 37.8 %  LV mass(C)d: 168.1 grams  LV mass(C)dI: 71.8 grams/m2  Ao root diam: 3.9 cm  asc Aorta Diam: 3.2 cm  LVOT diam: 2.6 cm  LVOT area: 5.3 cm2  Ao root diam index Ht(cm/m): 2.0  Ao root diam index BSA (cm/m2): 1.7  Asc Ao diam index BSA (cm/m2): 1.4  Asc Ao diam index Ht(cm/m): 1.6  LA Volume (BP): 55.5 ml     LA Volume Index (BP): 23.7 ml/m2  RWT: 0.32  TAPSE: 2.2 cm     Doppler Measurements & Calculations  MV E max loy: 66.2 cm/sec  MV A max loy: 58.8 cm/sec  MV E/A: 1.1  MV dec time: 0.20 sec  PA acc time: 0.14 sec  E/E' avg:  7.3  Lateral E/e': 5.8  Medial E/e': 8.8     ______________________________________________________________________________  Report approved by: Ryan Pizano MD on 05/08/2025 04:15 PM                Follow up/instructions: patient to follow up with is PCP for ongoing diabetes management. He will need to see ID about his MSSA bacterema.  Will need the PICC line out in a month.      Pending test results at discharge:      Unresulted Labs Ordered in the Past 30 Days of this Admission       Date and Time Order Name Status Description    5/8/2025 12:57 PM Blood Culture Peripheral blood (BC) Arm, Right Preliminary     5/8/2025 12:27 PM Blood Culture Peripheral blood (BC) Arm, Left Preliminary             Discharge Orders      Primary Care - Care Coordination Referral      Reason for your hospital stay    MSSA bacteremia     Activity    Your activity upon discharge: activity as tolerated     Diet    Follow this diet upon discharge: Current Diet:Orders Placed This Encounter      Moderate Consistent Carb (60 g CHO per Meal) Diet     Hospital Follow-up with Existing Primary Care Provider (PCP)            Discharge Disposition   Discharged to home  Condition at discharge: Stable      Consultations This Hospital Stay   INFECTIOUS DISEASES IP CONSULT  PALLIATIVE CARE ADULT IP CONSULT  CARE MANAGEMENT / SOCIAL WORK IP CONSULT  CARE MANAGEMENT / SOCIAL WORK IP CONSULT  CARE MANAGEMENT / SOCIAL WORK IP CONSULT  CARE MANAGEMENT / SOCIAL WORK IP CONSULT  VASCULAR ACCESS ADULT IP CONSULT    Code Status   Full Code    Time Spent on this Encounter   IPedro MD, personally saw the patient today and spent less than or equal to 30 minutes discharging this patient. Discussed with nursing        This document was created using voice recognition technology.  Please excuse any typographical errors that may have occurred.  Please call with any questions.       Pedro Bassett MD  [address]  SURGICAL  [address]  [facility] [address]  Phone: [phone]  Fax: [phone]  ______________________________________________________________________    Physical Exam   Vital Signs: Temp: 98.2  F (36.8  C) Temp src: Oral BP: 132/81 Pulse: 75   Resp: 17 SpO2: 98 % O2 Device: None (Room air)    Weight: 227 lbs 11.2 oz    Exam not changed  General appearance: Patient is alert and oriented x3, no apparent distress, pleasant and conversing normally, speaking in full sentences, appears stated age, lying in bed  HEENT:   Mucous membranes are moist  EXTREMITIES:  Moves all extremities, no clubbing, cyanosis, nor edema, no signs of cellulitis   :  Márquez ot present         Discharge Medications  Ancef IV 2g every 8 hours for 4 weeks    Current Discharge Medication List        START taking these medications    Details   !! insulin aspart (NOVOLOG PEN) 100 UNIT/ML pen Inject 2-10 Units subcutaneously 3 times daily (with meals).    Associated Diagnoses: Type 2 diabetes mellitus without complication, without long-term current use of insulin (H)       !! - Potential duplicate medications found. Please discuss with provider.        CONTINUE these medications which have CHANGED    Details   insulin glargine (LANTUS SOLOSTAR) 100 UNIT/ML pen Inject 35 Units subcutaneously at bedtime.  Qty: 15 mL, Refills: 3    Comments: If Lantus is not covered by insurance, may substitute Basaglar or Semglee or other insulin glargine product per insurance preference at same dose and frequency.    Associated Diagnoses: Type 2 diabetes mellitus without complication, without long-term current use of insulin (H)           CONTINUE these medications which have NOT CHANGED    Details   acetaminophen (TYLENOL) 500 MG tablet Take 1,000 mg by mouth every 6 hours as needed for mild pain.      aspirin (ASA) 325 MG EC tablet TAKE 1 TABLET BY MOUTH ONCE DAILY  Qty: 90 tablet, Refills: 3    Associated Diagnoses: History of ischemic stroke       atorvastatin (LIPITOR) 40 MG tablet TAKE 1 TABLET BY MOUTH ONCE DAILY  Qty: 90 tablet, Refills: 3    Associated Diagnoses: Hyperlipidemia, unspecified hyperlipidemia type      carvedilol (COREG) 6.25 MG tablet TAKE 1 TABLET BY MOUTH TWO TIMES A DAY WITH MEALS  Qty: 180 tablet, Refills: 0    Associated Diagnoses: Primary hypertension      FLUoxetine (PROZAC) 40 MG capsule Take 40 mg by mouth daily as needed.      ibuprofen (ADVIL/MOTRIN) 200 MG tablet Take 800 mg by mouth every 6 hours as needed for pain.      !! insulin aspart (NOVOLOG FLEXPEN) 100 UNIT/ML pen Inject 10-15 Units subcutaneously.      losartan (COZAAR) 50 MG tablet TAKE 1 TABLET BY MOUTH ONCE DAILY  Qty: 90 tablet, Refills: 0    Associated Diagnoses: Primary hypertension      blood glucose (NO BRAND SPECIFIED) test strip Use to test blood sugar 4 times daily or as directed.  Qty: 100 strip, Refills: 6    Associated Diagnoses: Type 2 diabetes mellitus without complication, with long-term current use of insulin (H)      blood glucose monitoring (NO BRAND SPECIFIED) meter device kit Use to test blood sugar 4 times daily or as directed.  Qty: 1 kit, Refills: 0    Comments: Preferred blood glucose meter OR supplies to accompany: glucometer per insurance  Associated Diagnoses: Type 2 diabetes mellitus without complication, with long-term current use of insulin (H)      ceFAZolin (ANCEF) injection Inject 20 mLs (2 g) over 5-10 minutes into the vein via push every 8 hours for 22 days.  Qty: [id number] mL, Refills: 0    Associated Diagnoses: Positive blood culture      Continuous Glucose Sensor (FREESTYLE ESTEFANY 3 SENSOR) MISC 1 each every 14 days. Use 1 sensor every 14 days. Use to read blood sugars per 's instructions.  Qty: 6 each, Refills: 0    Associated Diagnoses: Type 2 diabetes mellitus with hyperglycemia, with long-term current use of insulin (H)      Emergency Supply Kit, Central, Patient use for emergency only. Contents: 3 sodium chloride  "0.9% flushes, 1 dressing kit, 1 microclave ext set 14\", 4 nitrile gloves (med), 6 alcohol prep pads, 1 bacitracin, 1 syringe (10 cc 20 G 1\"). Call [phone] to reorder.  Qty: [id number] kit, Refills: 0    Associated Diagnoses: Positive blood culture      insulin pen needle (31G X 8 MM) 31G X 8 MM miscellaneous Use 4 pen needles daily or as directed.  Qty: 360 each, Refills: 3    Associated Diagnoses: Type 2 diabetes mellitus with hyperglycemia, with long-term current use of insulin (H)      sodium chloride, PF, 0.9% PF flush Inject 10 mLs into the vein as needed for line flush. Flush IV before and after medication administration as directed and/or at least every 24 hours.  Qty: [id number] mL, Refills: 0    Associated Diagnoses: Positive blood culture      thin (NO BRAND SPECIFIED) lancets Use with lanceting device.  Qty: 120 each, Refills: 0    Comments: To accompany: per insurance.    Future refills by PCP  [facility] with phone number [phone].  Associated Diagnoses: Type 2 diabetes mellitus without complication, with long-term current use of insulin (H)       !! - Potential duplicate medications found. Please discuss with provider.        STOP taking these medications       cefpodoxime (VANTIN) 200 MG tablet Comments:   Reason for Stopping:             Allergies   No Known Allergies    "

## 2025-05-12 ENCOUNTER — HOME INFUSION BILLING (OUTPATIENT)
Dept: HOME HEALTH SERVICES | Facility: HOME HEALTH | Age: 40
End: 2025-05-12
Payer: MEDICAID

## 2025-05-12 ENCOUNTER — HOME INFUSION (OUTPATIENT)
Dept: HOME HEALTH SERVICES | Facility: HOME HEALTH | Age: 40
End: 2025-05-12
Payer: MEDICAID

## 2025-05-12 VITALS
TEMPERATURE: 97.9 F | BODY MASS INDEX: 28.31 KG/M2 | HEART RATE: 69 BPM | HEIGHT: 75 IN | SYSTOLIC BLOOD PRESSURE: 121 MMHG | OXYGEN SATURATION: 99 % | WEIGHT: 227.7 LBS | DIASTOLIC BLOOD PRESSURE: 79 MMHG | RESPIRATION RATE: 18 BRPM

## 2025-05-12 LAB
GLUCOSE BLDC GLUCOMTR-MCNC: 261 MG/DL (ref 70–99)
GLUCOSE BLDC GLUCOMTR-MCNC: 275 MG/DL (ref 70–99)
GLUCOSE BLDC GLUCOMTR-MCNC: 329 MG/DL (ref 70–99)

## 2025-05-12 PROCEDURE — A9270 NON-COVERED ITEM OR SERVICE: HCPCS

## 2025-05-12 PROCEDURE — 36569 INSJ PICC 5 YR+ W/O IMAGING: CPT

## 2025-05-12 PROCEDURE — 99232 SBSQ HOSP IP/OBS MODERATE 35: CPT | Performed by: INTERNAL MEDICINE

## 2025-05-12 PROCEDURE — 250N000011 HC RX IP 250 OP 636: Performed by: SPECIALIST

## 2025-05-12 PROCEDURE — S9502 HIT ANTIBIOTIC Q8H DIEM: HCPCS

## 2025-05-12 PROCEDURE — 272N000579 HC TRAY POWER PICC SOLO 4FR SINGLE LUMEN

## 2025-05-12 PROCEDURE — A4452 WATERPROOF TAPE: HCPCS

## 2025-05-12 PROCEDURE — A4245 ALCOHOL WIPES PER BOX: HCPCS

## 2025-05-12 PROCEDURE — 250N000013 HC RX MED GY IP 250 OP 250 PS 637: Performed by: INTERNAL MEDICINE

## 2025-05-12 RX ORDER — INSULIN GLARGINE 100 [IU]/ML
35 INJECTION, SOLUTION SUBCUTANEOUS AT BEDTIME
Qty: 15 ML | Refills: 3 | Status: SHIPPED | OUTPATIENT
Start: 2025-05-12

## 2025-05-12 RX ORDER — CEFAZOLIN SODIUM 1 G/3ML
2 INJECTION, POWDER, FOR SOLUTION INTRAMUSCULAR; INTRAVENOUS EVERY 8 HOURS
Qty: 1 EACH | Refills: 1 | Status: SHIPPED | OUTPATIENT
Start: 2025-05-12 | End: 2025-05-12

## 2025-05-12 RX ADMIN — ATORVASTATIN CALCIUM 40 MG: 40 TABLET, FILM COATED ORAL at 09:05

## 2025-05-12 RX ADMIN — CARVEDILOL 6.25 MG: 6.25 TABLET, FILM COATED ORAL at 09:05

## 2025-05-12 RX ADMIN — CEFAZOLIN SODIUM 2 G: 2 SOLUTION INTRAVENOUS at 05:17

## 2025-05-12 RX ADMIN — ASPIRIN 325 MG: 325 TABLET, COATED ORAL at 09:05

## 2025-05-12 RX ADMIN — CEFAZOLIN SODIUM 2 G: 2 SOLUTION INTRAVENOUS at 13:49

## 2025-05-12 ASSESSMENT — ACTIVITIES OF DAILY LIVING (ADL)
ADLS_ACUITY_SCORE: 29

## 2025-05-12 NOTE — PROGRESS NOTES
Care Management Follow Up    Length of Stay (days): 4    Expected Discharge Date: 05/13/2025     Concerns to be Addressed: discharge planning     Patient plan of care discussed at interdisciplinary rounds: Yes    Anticipated Discharge Disposition: Home Infusion              Anticipated Discharge Services:  [facility]  Anticipated Discharge DME: None  Education Provided on the Discharge Plan:  yes  Patient/Family in Agreement with the Plan: yes    Referrals Placed by CM/SW: Home Infusion    Discussed  Partnership in Safe Discharge Planning  document with patient/family: No     Handoff Completed: No, handoff not indicated or clinically appropriate    Additional Information:  CM following for discharge planning and Home Infusion needs. Patient is being followed by [facility] for home antibiotics and cost estimates post discharge. Spoke to the Financial Counselor this morning about patient's insurance needs. Chart review indicates that patient may have already applied for MA and will be reaching out to his [facility] Worker. Per Financial Counselor who spoke to patient at bedside this morning. Patient spoke with his worker this morning and she has pushed the case through. Financial Counselor called [facility] to also expedite the case and the [facility] rep states that he just processed patients case this morning at 9am and it should go through this afternoon. Updated [facility] Liaison, Roxana, about patient having active MA insurance by this afternoon. Patient is to have his PICC/Midline placed today. His next dose of antibiotics is at 1300 today. He will be getting it every 8 hrs for 4 weeks. ID will place final antibiotic plan in chart and patient will be able to discharge after all arrangements are made.    Next Steps: follow up with [facility] for education and teaching and follow in the chart for MA active insurance.    ADDENDUM 1454:  Notification from Financial Counseling that patient now has insurance [place] Medicaid ID  #[id number]. Updated [facility] Liaison. She will be here at 1530 to do teaching and education with patient and wife and patient will discharge home after that.         Grisel Perkins RN BSN CM  Inpatient Care Coordination  [facility]  [phone]

## 2025-05-12 NOTE — PROGRESS NOTES
Cross cover notified of patient with glucose of 329.  Received glargine 25 units in the evening and also 5 units aspart for glucose 324 although this has worn off by now.  Reviewed chart.  Here with pyelonephritis.  -Give 8 units aspart

## 2025-05-12 NOTE — PROCEDURES
[facility]    Single Lumen PICC Placement    Date/Time: 5/12/2025 11:55 AM    Performed by: Olga Lidia Hogan RN  Authorized by: Pedro Bassett MD  Indications: vascular access      UNIVERSAL PROTOCOL   Site Marked: Yes  Prior Images Obtained and Reviewed:  Yes  Required items: Required blood products, implants, devices and special equipment available    Patient identity confirmed:  Verbally with patient, arm band, provided demographic data and hospital-assigned identification number  NA - No sedation, light sedation, or local anesthesia  Confirmation Checklist:  Patient's identity using two indicators, relevant allergies, procedure was appropriate and matched the consent or emergent situation and correct equipment/implants were available  Time out: Immediately prior to the procedure a time out was called (With bedside RN Elli GREEN)    Universal Protocol: the Joint Commission Universal Protocol was followed    Preparation: Patient was prepped and draped in usual sterile fashion       ANESTHESIA    Local Anesthetic:  Lidocaine 1% without epinephrine      SEDATION    Patient Sedated: No        Preparation: skin prepped with ChloraPrep  Skin prep agent: skin prep agent completely dried prior to procedure  Sterile barriers: maximum sterile barriers were used: cap, mask, sterile gown, sterile gloves, and large sterile sheet  Hand hygiene: hand hygiene performed prior to central venous catheter insertion  Type of line used: PICC  Catheter type: single lumen  Lumen type: valved and power PICC  Catheter size: 4 Fr  Brand: Bard  Lot number: [id number]  Placement method: venipuncture, MST and ultrasound  Number of attempts: 1  Difficulty threading catheter: no  Successful placement: yes  Orientation: left    Location: basilic vein  Tip Location: SVC/RA Junction  Site rationale: Pt preference  Arm circumference: adults 10 cm  Extremity circumference: 33  Visible catheter length: 2  Total catheter  length: 50  Dressing and securement: alcohol impregnated caps, blood cleaned with CHG, chlorhexidine disc applied, blood removed, dressing applied, subcutaneous anchor securement system and sterile dressing applied  Post procedure assessment: blood return through all ports, free fluid flow and placement verified by 3CG technology  PROCEDURE Describe Procedure: Successful placement of single lumen PICC line for home abx, pt tolerated procedure well.  No history of LDA per chart review.  PICC ready to use right away, bedside RN made aware.   Disposal: sharps and needle count correct at the end of procedure, needles and guidewire disposed in sharps container  Patient Tolerance:  Patient tolerated the procedure well with no immediate complications

## 2025-05-12 NOTE — PROGRESS NOTES
[facility]  Infectious Disease Progress Note          Assessment and Plan:   Date of Admission:  5/8/2025  Date of Consult (When I saw the patient): 05/09/25     Assessment:  40YM with uncontrolled diabetes complicated by polyneuropathy, who has been admitted due to fever, fatigue, dysuria and flank pain and has been found to have high grade staph aureus MSSA bacteremia with hematogenous seeding of the kidneys with pyelonephritis. No obvious abscess formation. Given extremely poor glycemic control, he remains at risk for further complciations     -High grade MSSA bacteremia, unclear source. Staph aureus is not a urinary pathogen and positive urine cx is suggestive of hematogenous spread  -Uncontrolled diabetes (prior HbA1c of > 11%)  -Hyponatremia  -Hx of osteomyelitis  -Chronic medical conditions- Diabetes, Hyperlipidemia, HTN, anxiety and depression, hx of CVA, tobacco abuse     Recommendations:  Follow blood cxs for clearance appears to have cleared, May 8 culture still negative   HbA1c note poorly controlled diabetes  CT abdomen pelvis to assess for additional focus of infection negative  Discontinue Ceftriaxone  Treat with Cefazolin plan  full 4-week course   Looks like MAS come through, home IV orders in and getting line now  At completion of antibiotics blood cultures x 2        Interval History:     no new complaints and doing well; no cp, sob, n/v/d, or abd pain.  No pain site, no ongoing fever, blood cultures remain negative and follow-up no obvious secondary sites              Medications:     Current Facility-Administered Medications   Medication Dose Route Frequency Provider Last Rate Last Admin    aspirin (ASA) EC tablet 325 mg  325 mg Oral Daily Pedro Bassett MD   325 mg at 05/12/25 0905    atorvastatin (LIPITOR) tablet 40 mg  40 mg Oral Daily Pedro Bassett MD   40 mg at 05/12/25 0905    carvedilol (COREG) tablet 6.25 mg  6.25 mg Oral BID w/meals Pedro Bassett  "MD ALONZO   6.25 mg at 05/12/25 0905    ceFAZolin (ANCEF) 2 g in dextrose 50 mL intermittent infusion  2 g Intravenous Q8H Jo Yancey  mL/hr at 05/11/25 0013 2 g at 05/12/25 0517    FLUoxetine (PROzac) capsule 40 mg  40 mg Oral Daily Pedro Bassett MD        insulin aspart (NovoLOG) injection (RAPID ACTING)  1-10 Units Subcutaneous TID AC Pedro Bassett MD   6 Units at 05/12/25 1003    insulin aspart (NovoLOG) injection (RAPID ACTING)  1-7 Units Subcutaneous At Bedtime Pedro Bassett MD   5 Units at 05/11/25 2151    insulin aspart (NovoLOG) injection (RAPID ACTING)   Subcutaneous TID w/meals Pedro Bassett MD   6 Units at 05/12/25 1004    insulin glargine (LANTUS PEN) injection 35 Units  35 Units Subcutaneous At Bedtime Pedro Bassett MD        losartan (COZAAR) tablet 50 mg  50 mg Oral QPM Pedro Bassett MD        oxymetazoline (AFRIN) 0.05 % spray 2 spray  2 spray Both Nostrils BID Pedro Bassett MD        sodium chloride (PF) 0.9% PF flush 3 mL  3 mL Intracatheter Q8H ADÁN Pedro Bassett MD   3 mL at 05/12/25 0906                  Physical Exam:   Blood pressure 121/79, pulse 69, temperature 97.9  F (36.6  C), temperature source Oral, resp. rate 18, height 1.905 m (6' 3\"), weight 103.3 kg (227 lb 11.2 oz), SpO2 99%.  Wt Readings from Last 2 Encounters:   05/08/25 103.3 kg (227 lb 11.2 oz)   05/06/25 106.9 kg (235 lb 10.8 oz)     Vital Signs with Ranges  Temp:  [97.9  F (36.6  C)-98.2  F (36.8  C)] 97.9  F (36.6  C)  Pulse:  [67-72] 69  Resp:  [16-18] 18  BP: (121-150)/(79-94) 121/79  SpO2:  [97 %-99 %] 99 %    Constitutional: Awake, alert, cooperative, no apparent distress     Lungs: Clear to auscultation bilaterally, no crackles or wheezing   Cardiovascular: Regular rate and rhythm, normal S1 and S2, and no murmur noted   Abdomen: Normal bowel sounds, soft, non-distended, non-tender   Skin: No rashes, no cyanosis, no edema   Other:           Data:   All " "microbiology laboratory data reviewed.  Recent Labs   Lab Test 05/09/25  0700 05/08/25  1255 05/06/25  1153   WBC 7.8 9.7 13.3*   HGB 13.2* 14.2 14.5   HCT 38.3* 41.0 41.6   MCV 86 85 86    349 329     Recent Labs   Lab Test 05/09/25  0700 05/08/25  1255 05/06/25  1153   CR 0.53* 0.55* 0.63*     No lab results found.  No lab results found.    Invalid input(s): \"UC\"     "

## 2025-05-12 NOTE — PLAN OF CARE
"Pt. A&Ox4, up independently, BGL at [id number], MD paged, coverage given. Plan for outpt. Abx, will need PICC line placement prior to discharge. Pt. Denies any needs at this time. Will continue with POC.     Goal Outcome Evaluation:      Plan of Care Reviewed With: patient    Overall Patient Progress: improvingOverall Patient Progress: improving    Outcome Evaluation: IV abx (ancef), Plan for PICC line placement prior to discharge for outpt. abx.      Problem: Adult Inpatient Plan of Care  Goal: Plan of Care Review  Description: The Plan of Care Review/Shift note should be completed every shift.  The Outcome Evaluation is a brief statement about your assessment that the patient is improving, declining, or no change.  This information will be displayed automatically on your shiftnote.  Outcome: Progressing  Flowsheets (Taken 5/12/2025 0600)  Outcome Evaluation: IV abx (ancef), Plan for PICC line placement prior to discharge for outpt. abx.  Plan of Care Reviewed With: patient  Overall Patient Progress: improving  Goal: Patient-Specific Goal (Individualized)  Description: You can add care plan individualizations to a care plan. Examples of Individualization might be:  \"Parent requests to be called daily at 9am for status\", \"I have a hard time hearing out of my right ear\", or \"Do not touch me to wake me up as it startlesme\".  Outcome: Progressing  Goal: Absence of Hospital-Acquired Illness or Injury  Outcome: Progressing  Intervention: Identify and Manage Fall Risk  Recent Flowsheet Documentation  Taken 5/12/2025 0131 by Sigrid Araiza RN  Safety Promotion/Fall Prevention:   clutter free environment maintained   increased rounding and observation   increase visualization of patient   lighting adjusted   nonskid shoes/slippers when out of bed   safety round/check completed  Intervention: Prevent Skin Injury  Recent Flowsheet Documentation  Taken 5/12/2025 0131 by Sigrid Araiza RN  Body Position: position changed " independently  Intervention: Prevent and Manage VTE (Venous Thromboembolism) Risk  Recent Flowsheet Documentation  Taken 5/12/2025 0131 by Sigrid Araiza RN  VTE Prevention/Management: SCDs off (sequential compression devices)  Goal: Optimal Comfort and Wellbeing  Outcome: Progressing  Goal: Readiness for Transition of Care  Outcome: Progressing     Problem: Infection  Goal: Absence of Infection Signs and Symptoms  Outcome: Progressing     Problem: Comorbidity Management  Goal: Blood Glucose Levels Within Targeted Range  Outcome: Progressing  Intervention: Monitor and Manage Glycemia  Recent Flowsheet Documentation  Taken 5/12/2025 0131 by Sigrid Araiza RN  Medication Review/Management: medications reviewed  Goal: Blood Pressure in Desired Range  Outcome: Progressing  Intervention: Maintain Blood Pressure Management  Recent Flowsheet Documentation  Taken 5/12/2025 0131 by Sigrid Araiza RN  Medication Review/Management: medications reviewed     Problem: UTI (Urinary Tract Infection)  Goal: Improved Infection Symptoms  Outcome: Progressing

## 2025-05-12 NOTE — DISCHARGE SUMMARY
AVS reviewed with pt and family. All questions answered. Pt and family deny any further questions or concerns. All belongings returned.  Pt escorted off unit by [place] staff. PICC line in place, pt will receive IV ABX at home.

## 2025-05-12 NOTE — PROGRESS NOTES
[facility]    Received request for benefit check should pt require home IV abx. Unfortunately, pt does not have active health insurance.     The out of pocket cost for cefazolin 2g q8 is $165.97/day for drug and supplies. Nursing visits are $90/visit and pt will require weekly visits for line care and labs and a visit at the end of therapy to remove line.     HI has no line preference.     Will meet with patient to introduce home infusion services and review benefits.    Thank you     Roxana Velasquez RN  [facility] Liaison  [phone] (Mon thru Fri 8am - 5pm)  [phone] Office

## 2025-05-12 NOTE — PROGRESS NOTES
[facility]  Start of Care/Resumption of Care Note    \[facility]\"" SOC    DX: pyelonephritis due to MSSA bacteremia    Therapy: Anti-Infective    Next Dose Due: 9pm tonight    Delivery plan: delivery to pt's home by 830pm tonight    In-basket sent to \[facility]\"" Scheduling, requesting visit on 5/14 when labs are due    Per \[facility]\"" care plan, labs are due on 5/14/25    Nursing plan: \[facility]\"" Nursing.     Teaching Plan: Liaison Teach Done?: Yes    Other Info: Cefazolin IVP every 8 hrs    Dunia Ramey, ISABEL 05/12/25

## 2025-05-12 NOTE — PROGRESS NOTES
[facility]    Medicine Progress Note - Hospitalist Service    Date of Admission:  5/8/2025    Primary Care Physician   Miles Case  CONSULTANTS: social work, ID    Assessment & Plan       Deni Coates is a 40 year old male who has a complex medical history including insulin-dependent type 2 diabetes with polyneuropathy, hypertension, anxiety depression, hyperlipidemia, previous osteomyelitis of the left toe, and previous stroke who is presenting with MSSA bacteremia.  As the story goes, the patient presented to the ER 5/6/2024 with fatigue, dizziness, flank tenderness, myalgias, dysuria, and fever.  He was found to be dehydrated with an elevated blood sugar and hyponatremia.  He was diagnosed with likely pyelonephritis and was sent home with doxycycline and Vantin to cover for both sexually-transmitted diseases as well as pyelonephritis.  His urine culture at that time was positive for MSSA and subsequently his blood cultures turn positive as well.  Patient was told to come back to the hospital due to the positive blood cultures     Today, patient is actually feeling much better than he did a couple days ago.  He did have some aches last night but otherwise his flank pain has resolved he has no fevers, denies any chills.  His dizziness has improved as well.  Patient's blood sugars still are uncontrolled.  In the emergency room he was found to have a white blood cell count of 9.7 which is improved from 13.3 a couple days ago.  His sodium is up to 132 from 126 a couple days ago.  His BUN 16, creatinine 0.55, he does have ketones in his urine still and his sugar is still elevated at 351.  His lactic acid is normal at 0.6 and his VBG was normal as well.  Patient was started on Ancef in the emergency room and is being admitted to the hospital.     Patient did not leave 5/11 due to finances.  Social working with the patient to get coverage for home infusion/antibiotics.    Blood sugar  still up will tirtate up his lantus.  Home whenever finances figures out.       MSSA bacteremia, pyelonephritis due to MSSA  Patient originally presented 5/6/2025 to the emergency room was diagnosed with pyelonephritis with an abnormal urinalysis and a culture came back as Staph aureus.  Subsequently his blood cultures grew out MSSA as well.  He initially was sent home on Vantin/doxycycline and at this point would be placed on IV antibiotics. Infectious disease  consulted.  At this point, we do not have a source.  CT of the abdomen/pelvis did not show infectious source though it did show pyelonephritis but with MSSA this likel was from hematogenous spread. .  TTE echo negative for vegitations. There is no obvious murmurs however on exam to suggest endocarditis.  Patient does have a history of osteomyelitis of his toe in 2022.  I am not sure what his source is at this point.  May need to consider white blood cell tagged scan,  Repeat blood cultures been sent in the emergency room and if remain negative (from 5/8) likely will need to have PICC line placed for ongoing IV antibiotics at home.  Will await infectious diseases input.  Patient denies any IV drug use.  Likely the patient is already feeling better from 2 days ago his white blood cell count is down to 9.7 from 13.3 and does not appear to be septic. Is on ancef, consider once a day dosing medication for compliance at home.   Patient does not have insurance coverage for IV antibiotics. And is paying out of pocket.    Will go home on ancef for 4 weeks.  He got a single lumen PICC line prior to discharge 5/11/25.  His repeat blood culture from 5/8/25 are negative to date.    Blood culture negative from 5/8/25 x2     Fatty liver, tiny left renal stone  Seen on CT     Dehydration, ketosis   Patient is quite dehydrated a couple days ago and he continues to have ketones in his urine.  Patient will be given IV fluids.     Type 2 insulin dependent diabetes   Patient's  blood sugars have been out of control since having his infection but also a risk for infecton.  He presented with a blood sugar over 300.  Patient has a hemoglobin A1c of 11.6 as of 6/27/2024.  I suspect his blood sugar's have been even higher since then with his infection.  Patient to continue on Lantus and continues on insulin correction scale.  Not sure why given the patient's age he is not taking prandial insulin or carb counting.  Hgb A1c high at 12.7.  patient to do carb counting and nursing will teach.  Continue on a correction scale and lantus for home.  Complicating matters is he does not have insurance.      BS up >250, will titrate up his lantus     Hyponatremia   Patient had a sodium down to 126 a couple days ago and its up to 133.        Essential hypertension    Patient will continue on his home Coreg and Cozaar     Anxiety/depression   Patient continues on his home Prozac     Hyperlipidemia   Patient continues on Lipitor     History osteomyelitis   In 12/2022 the patient had a left toe osteomyelitis.  I am unable to find any records or culture showing how he was treated or what his cultures grew out.     History ischemic stroke  Patient continues on aspirin, statin, and a beta-blocker     Tobacco abuse  Patient continues to smoke at home.  Has not smoked for a few days he said does not need nicotine replacement.  Cessation was discussed at length with the patient.  Given his age and history of stroke he needs to be off of all nicotine.            Discussed plan of care with nursing, social work/case management      Diet: Moderate Consistent Carb (60 g CHO per Meal) Diet  Diet    DVT Prophylaxis: Pneumatic Compression Devices  Márquez Catheter: Not present  Lines: None     Cardiac Monitoring: None    RESTRAINTS: none  Code Status: Full Code      This document was created using voice recognition technology.  Please excuse any typographical errors that may have occurred.  Please call with any questions.  "    Clinically Significant Risk Factors                   # Hypertension: Noted on problem list           # DMII: A1C = 12.7 % (Ref range: <5.7 %) within past 6 months   # Overweight: Estimated body mass index is 28.46 kg/m  as calculated from the following:    Height as of this encounter: 1.905 m (6' 3\").    Weight as of this encounter: 103.3 kg (227 lb 11.2 oz)., PRESENT ON ADMISSION            Disposition Plan      Expected Discharge Date: 05/13/2025      Destination: home            Barrier to discharge: finances  Medically Ready for Discharge: Ready Now, discharge note done 5/11, can go anytime once insurance issues figured out         Pedro Bassett MD  Hospitalist Service  [facility]  Securely message with Wantr (more info)  Text page via Aligo Paging/Directory   ______________________________________________________________________    Interval History   Awaiting finances go be figured out for home IV antibiotics.        ROS: A comprehensive review of systems was negative except for items noted in the HPI.  Patient currently denies any fever, chills, sweats, nausea, vomiting, diarrhea, shortness of breath, or chest pain.       Physical Exam   Vital Signs: Temp: 97.9  F (36.6  C) Temp src: Oral BP: 121/79 Pulse: 69   Resp: 18 SpO2: 99 % O2 Device: None (Room air)    Weight: 227 lbs 11.2 oz    Exam not changed  General appearance: Patient is alert and oriented x3, no apparent distress, pleasant and conversing normally, speaking in full sentences, appears stated age, lying in bed  HEENT:   Mucous membranes are moist  EXTREMITIES:  Moves all extremities,  edema  :  Márquez not present          Data         Imaging:   Results for orders placed or performed during the hospital encounter of 05/08/25   CT Abdomen Pelvis w Contrast    Narrative    EXAM: CT ABDOMEN PELVIS W CONTRAST  LOCATION: [facility]  DATE: 5/9/2025    INDICATION: High grade staph aureus bacteremia, " assess for additional focus of infection.    COMPARISON: CT 2012.    TECHNIQUE: CT scan of the abdomen and pelvis was performed following injection of intravenous contrast. Multiplanar reformats were obtained. Dose reduction techniques were used.    CONTRAST: 100 mL Isovue 370.    FINDINGS:   LOWER CHEST: Normal.    HEPATOBILIARY: There may be mild fatty liver. No acute liver or gallbladder abnormality. No calcified gallstones.    PANCREAS: Normal.    SPLEEN: Normal.    ADRENAL GLANDS: Normal.    KIDNEYS/BLADDER: There are multifocal bilateral regions of parenchymal hypoenhancement that appear ill-defined. One of the largest is along the anterior right mid to low kidney measuring 2 cm series 3, image 96. Other examples seen at the lower right   kidney and lower left kidney on image 116. No hydronephrosis. Tiny 1 mm nonobstructing stone mid left kidney series 3, image 82. The bladder appears unremarkable.    BOWEL: Normal appendix. No acute inflammatory change of the bowel. No bowel obstruction. Moderate stool. No free fluid or free air. No abscess identified.    LYMPH NODES: Normal.    VASCULATURE: Mild calcifications.    PELVIC ORGANS: No acute abnormality.    MUSCULOSKELETAL: No acute abnormality.      Impression    IMPRESSION:   1.  Bilateral patchy areas of focal hypoenhancement involving each kidney. This pattern is consistent with bilateral pyelonephritis. Recommend follow-up CT or MRI of the kidneys to ensure resolution.  2.  Tiny nonobstructing stone at the left kidney.  3.  Suggestion of fatty liver.     Echocardiogram Complete     Value    LVEF  55-60%    Narrative    [id number]  [id number]  [id number]  [id number]^JERRY^BREANNA^ALONZO     [facility]  Echocardiography Laboratory  [address] [place] [address]     Name: MIKE HOFFMANN  MRN: [id number]  : 1985  Study Date: 2025 02:44 PM  Age: 40 yrs  Gender: Male  Patient Location: [facility]  Reason For Study:  Endocarditis  Ordering Physician: BREANNA EDWARDS  Performed By: Ayala Lowery     BSA: 2.3 m2  Height: 76 in  Weight: 228 lb  BP: 124/91 mmHg  ______________________________________________________________________________  Procedure  Echocardiogram with two-dimensional, color and spectral Doppler.  ______________________________________________________________________________  Interpretation Summary     The visual ejection fraction is 55-60%.  Left ventricular systolic function is normal.  There is trace aortic regurgitation.  There is no evidence of a mass or vegetation. This does not rule out  endocarditis.  ______________________________________________________________________________  Left Ventricle  The left ventricle is normal in size. There is normal left ventricular wall  thickness. Diastolic Doppler findings (E/E' ratio and/or other parameters)  suggest left ventricular filling pressures are indeterminate. The visual  ejection fraction is 55-60%. Left ventricular systolic function is normal.     Right Ventricle  The right ventricle is normal in size and function.     Atria  Normal left atrial size. Right atrial size is normal. There is no color  Doppler evidence of an atrial shunt.     Mitral Valve  There is trace mitral regurgitation.     Tricuspid Valve  There is trace tricuspid regurgitation.     Aortic Valve  The aortic valve is trileaflet. There is trace aortic regurgitation. No  hemodynamically significant valvular aortic stenosis.     Pulmonic Valve  There is no pulmonic valvular regurgitation. Normal pulmonic valve velocity.     Vessels  The aortic root is normal size. Normal size ascending aorta. IVC diameter <2.1  cm collapsing >50% with sniff suggests a normal RA pressure of 3 mmHg.     Pericardium  There is no pericardial effusion.     Rhythm  Sinus rhythm was noted.  ______________________________________________________________________________  MMode/2D Measurements & Calculations  IVSd:  0.99 cm  LVIDd: 5.1 cm  LVIDs: 3.2 cm  LVPWd: 0.82 cm  FS: 37.8 %  LV mass(C)d: 168.1 grams  LV mass(C)dI: 71.8 grams/m2  Ao root diam: 3.9 cm  asc Aorta Diam: 3.2 cm  LVOT diam: 2.6 cm  LVOT area: 5.3 cm2  Ao root diam index Ht(cm/m): 2.0  Ao root diam index BSA (cm/m2): 1.7  Asc Ao diam index BSA (cm/m2): 1.4  Asc Ao diam index Ht(cm/m): 1.6  LA Volume (BP): 55.5 ml     LA Volume Index (BP): 23.7 ml/m2  RWT: 0.32  TAPSE: 2.2 cm     Doppler Measurements & Calculations  MV E max loy: 66.2 cm/sec  MV A max loy: 58.8 cm/sec  MV E/A: 1.1  MV dec time: 0.20 sec  PA acc time: 0.14 sec  E/E' av.3  Lateral E/e': 5.8  Medial E/e': 8.8     ______________________________________________________________________________  Report approved by: Ryan Pizano MD on 2025 04:15 PM           Procedures:    I have personally have reviewed the patient's most up to labs, orders, and medications myself

## 2025-05-12 NOTE — PLAN OF CARE
"Goal Outcome Evaluation:      Plan of Care Reviewed With: patient    Overall Patient Progress: improvingOverall Patient Progress: improving    Outcome Evaluation: IV antibiotics given. Plan for placement of PICC line.  A & O, VSS, LS clear, O2 sat RA, mod carb diet, up independent. PiV/SL, BG AC/HS with carb count. Will continue POC and monitoring.   Problem: Adult Inpatient Plan of Care  Goal: Plan of Care Review  Description: The Plan of Care Review/Shift note should be completed every shift.  The Outcome Evaluation is a brief statement about your assessment that the patient is improving, declining, or no change.  This information will be displayed automatically on your shiftnote.  Outcome: Progressing  Flowsheets (Taken 5/11/2025 1779)  Outcome Evaluation: IV antibiotics given. Plan for placement of PICC line.  Plan of Care Reviewed With: patient  Overall Patient Progress: improving  Goal: Patient-Specific Goal (Individualized)  Description: You can add care plan individualizations to a care plan. Examples of Individualization might be:  \"Parent requests to be called daily at 9am for status\", \"I have a hard time hearing out of my right ear\", or \"Do not touch me to wake me up as it startlesme\".  Outcome: Progressing  Goal: Absence of Hospital-Acquired Illness or Injury  Outcome: Progressing  Goal: Optimal Comfort and Wellbeing  Outcome: Progressing  Goal: Readiness for Transition of Care  Outcome: Progressing     Problem: Infection  Goal: Absence of Infection Signs and Symptoms  Outcome: Progressing     Problem: Comorbidity Management  Goal: Blood Glucose Levels Within Targeted Range  Outcome: Progressing  Goal: Blood Pressure in Desired Range  Outcome: Progressing     Problem: UTI (Urinary Tract Infection)  Goal: Improved Infection Symptoms  Outcome: Progressing           "

## 2025-05-12 NOTE — PLAN OF CARE
"VSS on RA. A&Ox4. Denies pain. Denies SOB. LS: clear. On IV Ancef. PICC line placed today and patent. , 261. Independent in room. SW following for discharge plan. Pt will discharge with home infusion for IV Abx once MA insurance is in place. Possible discharge later today.    Goal Outcome Evaluation:      Plan of Care Reviewed With: patient    Overall Patient Progress: improving    Outcome Evaluation: On IV Abx. PICC line placed. Possible discharge later today.    Problem: Adult Inpatient Plan of Care  Goal: Plan of Care Review  Description: The Plan of Care Review/Shift note should be completed every shift.  The Outcome Evaluation is a brief statement about your assessment that the patient is improving, declining, or no change.  This information will be displayed automatically on your shiftnote.  Outcome: Progressing  Flowsheets (Taken 5/12/2025 1412)  Outcome Evaluation: On IV Abx. PICC line placed. Possible discharge later today.  Plan of Care Reviewed With: patient  Overall Patient Progress: improving  Goal: Patient-Specific Goal (Individualized)  Description: You can add care plan individualizations to a care plan. Examples of Individualization might be:  \"Parent requests to be called daily at 9am for status\", \"I have a hard time hearing out of my right ear\", or \"Do not touch me to wake me up as it startlesme\".  Outcome: Progressing  Goal: Absence of Hospital-Acquired Illness or Injury  Outcome: Progressing  Intervention: Identify and Manage Fall Risk  Recent Flowsheet Documentation  Taken 5/12/2025 0903 by Elli Albarran RN  Safety Promotion/Fall Prevention:   assistive device/personal items within reach   clutter free environment maintained   increased rounding and observation   increase visualization of patient   lighting adjusted   mobility aid in reach   nonskid shoes/slippers when out of bed   patient and family education   room organization consistent   safety round/check completed   treat " reversible contributory factors   treat underlying cause  Intervention: Prevent Skin Injury  Recent Flowsheet Documentation  Taken 5/12/2025 0903 by Elli Albarran RN  Body Position: position changed independently  Intervention: Prevent and Manage VTE (Venous Thromboembolism) Risk  Recent Flowsheet Documentation  Taken 5/12/2025 0903 by Elli Albarran RN  VTE Prevention/Management:   SCDs off (sequential compression devices)   patient refused intervention  Intervention: Prevent Infection  Recent Flowsheet Documentation  Taken 5/12/2025 0903 by Elli Albarran RN  Infection Prevention:   hand hygiene promoted   rest/sleep promoted   single patient room provided  Goal: Optimal Comfort and Wellbeing  Outcome: Progressing  Goal: Readiness for Transition of Care  Outcome: Progressing     Problem: Infection  Goal: Absence of Infection Signs and Symptoms  Outcome: Progressing     Problem: Comorbidity Management  Goal: Blood Glucose Levels Within Targeted Range  Outcome: Progressing  Intervention: Monitor and Manage Glycemia  Recent Flowsheet Documentation  Taken 5/12/2025 0903 by Elli Albarran RN  Medication Review/Management: medications reviewed  Goal: Blood Pressure in Desired Range  Outcome: Progressing  Intervention: Maintain Blood Pressure Management  Recent Flowsheet Documentation  Taken 5/12/2025 0903 by Elli Albarran RN  Medication Review/Management: medications reviewed     Problem: UTI (Urinary Tract Infection)  Goal: Improved Infection Symptoms  Outcome: Progressing

## 2025-05-12 NOTE — PROGRESS NOTES
Home Infusion  Deni will be discharging today and going home on IV cefazolin q8.  I met with Deni at bedside and instructed in IV administration via SL PICC with SAS flushing.   Had Deni perform hands on with practice equipment and teaching sheets. Provided information about supplies and supply delivery, storage of medication, checking of label, dosing times, plan for SNV and 24/7 availability of I staff. [facility] will follow for home RN.   Deni verbalized understanding of information given. He demonstrated very good technique with practice and verbalized good understanding of process.  Stated he feels comfortable with administering abx in the home setting.   Dosing schedule: Pt will continue to dose around 5am, 1pm, 9pm.   Delivery: Medication and supplies will be delivered to pt's home this evening prior to 2100h dose.   Deni is ready for discharge from [facility] perspective.    Roxana Velasquez RN  [facility] Liaison  [phone] (M-F 8a-5p)  [phone] Office

## 2025-05-13 ENCOUNTER — HOME INFUSION BILLING (OUTPATIENT)
Dept: HOME HEALTH SERVICES | Facility: HOME HEALTH | Age: 40
End: 2025-05-13
Payer: MEDICAID

## 2025-05-13 LAB
BACTERIA SPEC CULT: NO GROWTH
BACTERIA SPEC CULT: NO GROWTH

## 2025-05-13 PROCEDURE — A9270 NON-COVERED ITEM OR SERVICE: HCPCS

## 2025-05-13 PROCEDURE — S9502 HIT ANTIBIOTIC Q8H DIEM: HCPCS

## 2025-05-14 PROCEDURE — S9502 HIT ANTIBIOTIC Q8H DIEM: HCPCS

## 2025-05-15 ENCOUNTER — HOME CARE VISIT (OUTPATIENT)
Dept: HOME HEALTH SERVICES | Facility: HOME HEALTH | Age: 40
End: 2025-05-15
Payer: MEDICAID

## 2025-05-15 ENCOUNTER — LAB REQUISITION (OUTPATIENT)
Dept: LAB | Facility: CLINIC | Age: 40
End: 2025-05-15
Payer: MEDICAID

## 2025-05-15 ENCOUNTER — PATIENT OUTREACH (OUTPATIENT)
Dept: CARE COORDINATION | Facility: CLINIC | Age: 40
End: 2025-05-15
Payer: MEDICAID

## 2025-05-15 VITALS
BODY MASS INDEX: 28.37 KG/M2 | TEMPERATURE: 97.1 F | RESPIRATION RATE: 16 BRPM | DIASTOLIC BLOOD PRESSURE: 78 MMHG | SYSTOLIC BLOOD PRESSURE: 102 MMHG | OXYGEN SATURATION: 98 % | HEART RATE: 71 BPM | WEIGHT: 227 LBS

## 2025-05-15 DIAGNOSIS — B95.61 METHICILLIN SUSCEPTIBLE STAPHYLOCOCCUS AUREUS INFECTION AS THE CAUSE OF DISEASES CLASSIFIED ELSEWHERE: ICD-10-CM

## 2025-05-15 DIAGNOSIS — R78.81 BACTEREMIA: ICD-10-CM

## 2025-05-15 LAB
AST SERPL W P-5'-P-CCNC: 22 U/L (ref 0–45)
BASOPHILS # BLD AUTO: 0.1 10E3/UL (ref 0–0.2)
BASOPHILS NFR BLD AUTO: 1 %
CREAT SERPL-MCNC: 0.63 MG/DL (ref 0.67–1.17)
EGFRCR SERPLBLD CKD-EPI 2021: >90 ML/MIN/1.73M2
EOSINOPHIL # BLD AUTO: 0.1 10E3/UL (ref 0–0.7)
EOSINOPHIL NFR BLD AUTO: 2 %
ERYTHROCYTE [DISTWIDTH] IN BLOOD BY AUTOMATED COUNT: 12.1 % (ref 10–15)
HCT VFR BLD AUTO: 39.1 % (ref 40–53)
HGB BLD-MCNC: 13.1 G/DL (ref 13.3–17.7)
IMM GRANULOCYTES # BLD: 0 10E3/UL
IMM GRANULOCYTES NFR BLD: 1 %
LYMPHOCYTES # BLD AUTO: 1.7 10E3/UL (ref 0.8–5.3)
LYMPHOCYTES NFR BLD AUTO: 26 %
MCH RBC QN AUTO: 29.8 PG (ref 26.5–33)
MCHC RBC AUTO-ENTMCNC: 33.5 G/DL (ref 31.5–36.5)
MCV RBC AUTO: 89 FL (ref 78–100)
MONOCYTES # BLD AUTO: 0.5 10E3/UL (ref 0–1.3)
MONOCYTES NFR BLD AUTO: 7 %
NEUTROPHILS # BLD AUTO: 4.2 10E3/UL (ref 1.6–8.3)
NEUTROPHILS NFR BLD AUTO: 63 %
NRBC # BLD AUTO: 0 10E3/UL
NRBC BLD AUTO-RTO: 0 /100
PLATELET # BLD AUTO: 405 10E3/UL (ref 150–450)
RBC # BLD AUTO: 4.39 10E6/UL (ref 4.4–5.9)
WBC # BLD AUTO: 6.6 10E3/UL (ref 4–11)

## 2025-05-15 PROCEDURE — S9502 HIT ANTIBIOTIC Q8H DIEM: HCPCS

## 2025-05-15 PROCEDURE — 85004 AUTOMATED DIFF WBC COUNT: CPT | Performed by: INTERNAL MEDICINE

## 2025-05-15 PROCEDURE — 82565 ASSAY OF CREATININE: CPT | Performed by: INTERNAL MEDICINE

## 2025-05-15 PROCEDURE — 84450 TRANSFERASE (AST) (SGOT): CPT | Performed by: INTERNAL MEDICINE

## 2025-05-15 NOTE — PROGRESS NOTES
Clinic Care Coordination Contact  RUST/Voicemail    Clinical Data: Care Coordinator Outreach    Outreach Documentation Number of Outreach Attempt   5/15/2025  10:23 AM 1     Left message on patient's voicemail with call back information and requested return call.    Plan: Care Coordinator  will try to reach patient again in 1-2 business days.    Ingrid Guerin RN, BSN, CPHN, [facility] Care Management  [facility], and [facility]  [email or web address]  Office: [phone]  Employed by [employer]

## 2025-05-15 NOTE — PROGRESS NOTES
Nursing Visit Note:  Nurse visit today for admission, CLC & labs  for Deni Coates.     present during visit today: Not Applicable.    Intravenous Access:  Labs drawn without difficulty. and PICC.    Education Provided:     Patient Education focused on Admission, CLC care, medication administration, infection control, flushing protocol, when to notify [facility]/MD, on-time pickup of labs .    and Lab-Only Nursing Note    Labs obtained via PICC    Time Specimen drawn: 1135    Last dose (if applicable): No    Facility sent to: [facility] Tracking number: [id number]    Note: VSS. Pt in good spirits and feels comfortable with his PICC line and medication administration. Admission packet reviewed with patient. All questions answered No further needs at this time.     Saline administered: 30 (ml)    Supply Check:   Does the patient have all the supplies they need for the next visit?  Yes    Next visit plan: 5/21/25 at 10am     Kandis Garcia RN 5/15/2025

## 2025-05-16 ENCOUNTER — HOME INFUSION (OUTPATIENT)
Dept: HOME HEALTH SERVICES | Facility: HOME HEALTH | Age: 40
End: 2025-05-16
Payer: MEDICAID

## 2025-05-16 DIAGNOSIS — R78.81 POSITIVE BLOOD CULTURE: Primary | ICD-10-CM

## 2025-05-16 PROCEDURE — S9502 HIT ANTIBIOTIC Q8H DIEM: HCPCS

## 2025-05-17 PROCEDURE — S9502 HIT ANTIBIOTIC Q8H DIEM: HCPCS

## 2025-05-18 PROCEDURE — S9502 HIT ANTIBIOTIC Q8H DIEM: HCPCS

## 2025-05-19 PROCEDURE — S9502 HIT ANTIBIOTIC Q8H DIEM: HCPCS

## 2025-05-20 ENCOUNTER — HOME INFUSION BILLING (OUTPATIENT)
Dept: HOME HEALTH SERVICES | Facility: HOME HEALTH | Age: 40
End: 2025-05-20
Payer: MEDICAID

## 2025-05-20 PROCEDURE — A9270 NON-COVERED ITEM OR SERVICE: HCPCS

## 2025-05-20 PROCEDURE — S9502 HIT ANTIBIOTIC Q8H DIEM: HCPCS

## 2025-05-21 ENCOUNTER — LAB REQUISITION (OUTPATIENT)
Dept: LAB | Facility: CLINIC | Age: 40
End: 2025-05-21
Payer: MEDICAID

## 2025-05-21 ENCOUNTER — HOME CARE VISIT (OUTPATIENT)
Dept: HOME HEALTH SERVICES | Facility: HOME HEALTH | Age: 40
End: 2025-05-21
Payer: MEDICAID

## 2025-05-21 VITALS
DIASTOLIC BLOOD PRESSURE: 60 MMHG | RESPIRATION RATE: 20 BRPM | HEART RATE: 80 BPM | SYSTOLIC BLOOD PRESSURE: 114 MMHG | OXYGEN SATURATION: 98 % | TEMPERATURE: 97.9 F

## 2025-05-21 DIAGNOSIS — B95.61 METHICILLIN SUSCEPTIBLE STAPHYLOCOCCUS AUREUS INFECTION AS THE CAUSE OF DISEASES CLASSIFIED ELSEWHERE: ICD-10-CM

## 2025-05-21 DIAGNOSIS — R78.81 BACTEREMIA: ICD-10-CM

## 2025-05-21 LAB
AST SERPL W P-5'-P-CCNC: 22 U/L (ref 0–45)
BASOPHILS # BLD AUTO: 0.1 10E3/UL (ref 0–0.2)
BASOPHILS NFR BLD AUTO: 1 %
CREAT SERPL-MCNC: 0.57 MG/DL (ref 0.67–1.17)
EGFRCR SERPLBLD CKD-EPI 2021: >90 ML/MIN/1.73M2
EOSINOPHIL # BLD AUTO: 0.1 10E3/UL (ref 0–0.7)
EOSINOPHIL NFR BLD AUTO: 2 %
ERYTHROCYTE [DISTWIDTH] IN BLOOD BY AUTOMATED COUNT: 12.1 % (ref 10–15)
HCT VFR BLD AUTO: 44.1 % (ref 40–53)
HGB BLD-MCNC: 14.6 G/DL (ref 13.3–17.7)
IMM GRANULOCYTES # BLD: 0 10E3/UL
IMM GRANULOCYTES NFR BLD: 0 %
LYMPHOCYTES # BLD AUTO: 2 10E3/UL (ref 0.8–5.3)
LYMPHOCYTES NFR BLD AUTO: 35 %
MCH RBC QN AUTO: 29.2 PG (ref 26.5–33)
MCHC RBC AUTO-ENTMCNC: 33.1 G/DL (ref 31.5–36.5)
MCV RBC AUTO: 88 FL (ref 78–100)
MONOCYTES # BLD AUTO: 0.3 10E3/UL (ref 0–1.3)
MONOCYTES NFR BLD AUTO: 6 %
NEUTROPHILS # BLD AUTO: 3 10E3/UL (ref 1.6–8.3)
NEUTROPHILS NFR BLD AUTO: 55 %
NRBC # BLD AUTO: 0 10E3/UL
NRBC BLD AUTO-RTO: 0 /100
PLATELET # BLD AUTO: 307 10E3/UL (ref 150–450)
RBC # BLD AUTO: 5 10E6/UL (ref 4.4–5.9)
WBC # BLD AUTO: 5.5 10E3/UL (ref 4–11)

## 2025-05-21 PROCEDURE — 84450 TRANSFERASE (AST) (SGOT): CPT | Performed by: INTERNAL MEDICINE

## 2025-05-21 PROCEDURE — 99601 HOME NFS VISIT <2 HRS: CPT

## 2025-05-21 PROCEDURE — 85025 COMPLETE CBC W/AUTO DIFF WBC: CPT | Performed by: INTERNAL MEDICINE

## 2025-05-21 PROCEDURE — 82565 ASSAY OF CREATININE: CPT | Performed by: INTERNAL MEDICINE

## 2025-05-21 PROCEDURE — S9502 HIT ANTIBIOTIC Q8H DIEM: HCPCS

## 2025-05-21 NOTE — PROGRESS NOTES
Nursing Visit Note:  Nurse visit today for CLC and labs  for Deni DK Coates.     present during visit today: Not Applicable.    Intravenous Access:  Labs drawn without difficulty. and PICC.    Lab-Only Nursing Note    Labs obtained via PICC    Time Specimen drawn: 1050    Last dose (if applicable): No    Facility sent to: [facility] Tracking number: [id number]    Note: VSS, patient reported he is feeling good.  denied missed doses of ABX.  All questions answered by RN.     Saline administered: 30 ml  (ml)    Supply Check:   Does the patient have all the supplies they need for the next visit?  Yes    Next visit plan: 1 week 5/28/25 for CLC and labs     Margi Nicolas, ISABEL 5/21/2025

## 2025-05-22 PROCEDURE — S9502 HIT ANTIBIOTIC Q8H DIEM: HCPCS

## 2025-05-23 PROCEDURE — S9502 HIT ANTIBIOTIC Q8H DIEM: HCPCS

## 2025-05-24 PROCEDURE — S9502 HIT ANTIBIOTIC Q8H DIEM: HCPCS

## 2025-05-25 PROCEDURE — S9502 HIT ANTIBIOTIC Q8H DIEM: HCPCS

## 2025-05-26 PROCEDURE — S9502 HIT ANTIBIOTIC Q8H DIEM: HCPCS

## 2025-05-27 ENCOUNTER — HOME INFUSION BILLING (OUTPATIENT)
Dept: HOME HEALTH SERVICES | Facility: HOME HEALTH | Age: 40
End: 2025-05-27
Payer: MEDICAID

## 2025-05-27 PROCEDURE — S1015 IV TUBING EXTENSION SET: HCPCS

## 2025-05-27 PROCEDURE — A9270 NON-COVERED ITEM OR SERVICE: HCPCS

## 2025-05-27 PROCEDURE — S9502 HIT ANTIBIOTIC Q8H DIEM: HCPCS

## 2025-05-28 ENCOUNTER — HOME CARE VISIT (OUTPATIENT)
Dept: HOME HEALTH SERVICES | Facility: HOME HEALTH | Age: 40
End: 2025-05-28
Payer: MEDICAID

## 2025-05-28 ENCOUNTER — LAB REQUISITION (OUTPATIENT)
Dept: LAB | Facility: CLINIC | Age: 40
End: 2025-05-28
Payer: MEDICAID

## 2025-05-28 VITALS
RESPIRATION RATE: 20 BRPM | HEART RATE: 86 BPM | OXYGEN SATURATION: 98 % | SYSTOLIC BLOOD PRESSURE: 142 MMHG | TEMPERATURE: 97.1 F | DIASTOLIC BLOOD PRESSURE: 70 MMHG

## 2025-05-28 DIAGNOSIS — B95.61 METHICILLIN SUSCEPTIBLE STAPHYLOCOCCUS AUREUS INFECTION AS THE CAUSE OF DISEASES CLASSIFIED ELSEWHERE: ICD-10-CM

## 2025-05-28 DIAGNOSIS — R78.81 BACTEREMIA: ICD-10-CM

## 2025-05-28 LAB
AST SERPL W P-5'-P-CCNC: 20 U/L (ref 0–45)
BASOPHILS # BLD AUTO: 0.1 10E3/UL (ref 0–0.2)
BASOPHILS NFR BLD AUTO: 2 %
CREAT SERPL-MCNC: 0.58 MG/DL (ref 0.67–1.17)
EGFRCR SERPLBLD CKD-EPI 2021: >90 ML/MIN/1.73M2
EOSINOPHIL # BLD AUTO: 0.2 10E3/UL (ref 0–0.7)
EOSINOPHIL NFR BLD AUTO: 3 %
ERYTHROCYTE [DISTWIDTH] IN BLOOD BY AUTOMATED COUNT: 12.1 % (ref 10–15)
HCT VFR BLD AUTO: 41.2 % (ref 40–53)
HGB BLD-MCNC: 13.9 G/DL (ref 13.3–17.7)
HOLD SPECIMEN: NORMAL
IMM GRANULOCYTES # BLD: 0 10E3/UL
IMM GRANULOCYTES NFR BLD: 0 %
LYMPHOCYTES # BLD AUTO: 2 10E3/UL (ref 0.8–5.3)
LYMPHOCYTES NFR BLD AUTO: 40 %
MCH RBC QN AUTO: 29.5 PG (ref 26.5–33)
MCHC RBC AUTO-ENTMCNC: 33.7 G/DL (ref 31.5–36.5)
MCV RBC AUTO: 88 FL (ref 78–100)
MONOCYTES # BLD AUTO: 0.3 10E3/UL (ref 0–1.3)
MONOCYTES NFR BLD AUTO: 6 %
NEUTROPHILS # BLD AUTO: 2.6 10E3/UL (ref 1.6–8.3)
NEUTROPHILS NFR BLD AUTO: 50 %
NRBC # BLD AUTO: 0 10E3/UL
NRBC BLD AUTO-RTO: 0 /100
PLATELET # BLD AUTO: 223 10E3/UL (ref 150–450)
RBC # BLD AUTO: 4.71 10E6/UL (ref 4.4–5.9)
WBC # BLD AUTO: 5.2 10E3/UL (ref 4–11)

## 2025-05-28 PROCEDURE — 85025 COMPLETE CBC W/AUTO DIFF WBC: CPT | Performed by: INTERNAL MEDICINE

## 2025-05-28 PROCEDURE — S9502 HIT ANTIBIOTIC Q8H DIEM: HCPCS

## 2025-05-28 PROCEDURE — 82565 ASSAY OF CREATININE: CPT | Performed by: INTERNAL MEDICINE

## 2025-05-28 PROCEDURE — 84450 TRANSFERASE (AST) (SGOT): CPT | Performed by: INTERNAL MEDICINE

## 2025-05-28 PROCEDURE — 99601 HOME NFS VISIT <2 HRS: CPT

## 2025-05-28 NOTE — PROGRESS NOTES
Nursing Visit Note:  Nurse visit today for GA, CLC, and labs  for Deni Coates.     present during visit today: Not Applicable.    Intravenous Access:  Labs drawn without difficulty. and PICC.    Lab-Only Nursing Note    Labs obtained via PICC    Time Specimen drawn: 1310    Last dose (if applicable): No    Facility sent to: [facility] Tracking number: [id number]    Note: VSS, patient reports 3 missed doses since last week.  Reported that he fell asleep for one and was not home for another, so he skipped a dose.  Patient educated to set alarms to help him not miss doses.  All questions answered by RN.     Saline administered: 50 ml  (ml)    Supply Check:   Does the patient have all the supplies they need for the next visit?  Yes    Next visit plan: 1 week 6/4/25 for CLC and labs     Margi Nicolas RN 5/28/2025

## 2025-05-29 PROCEDURE — S9502 HIT ANTIBIOTIC Q8H DIEM: HCPCS

## 2025-05-30 PROCEDURE — S9502 HIT ANTIBIOTIC Q8H DIEM: HCPCS

## 2025-05-31 PROCEDURE — S9502 HIT ANTIBIOTIC Q8H DIEM: HCPCS

## 2025-06-01 ENCOUNTER — EPISODE UPDATE (OUTPATIENT)
Dept: HOME HEALTH SERVICES | Facility: HOME HEALTH | Age: 40
End: 2025-06-01

## 2025-06-01 PROCEDURE — S9502 HIT ANTIBIOTIC Q8H DIEM: HCPCS

## 2025-06-02 PROCEDURE — S9502 HIT ANTIBIOTIC Q8H DIEM: HCPCS

## 2025-06-03 PROCEDURE — S9502 HIT ANTIBIOTIC Q8H DIEM: HCPCS

## 2025-06-04 ENCOUNTER — LAB REQUISITION (OUTPATIENT)
Dept: LAB | Facility: CLINIC | Age: 40
End: 2025-06-04
Payer: COMMERCIAL

## 2025-06-04 ENCOUNTER — HOME CARE VISIT (OUTPATIENT)
Dept: HOME HEALTH SERVICES | Facility: HOME HEALTH | Age: 40
End: 2025-06-04
Payer: MEDICAID

## 2025-06-04 VITALS
RESPIRATION RATE: 20 BRPM | SYSTOLIC BLOOD PRESSURE: 122 MMHG | TEMPERATURE: 96.9 F | OXYGEN SATURATION: 98 % | HEART RATE: 87 BPM | DIASTOLIC BLOOD PRESSURE: 62 MMHG

## 2025-06-04 DIAGNOSIS — R78.81 BACTEREMIA: ICD-10-CM

## 2025-06-04 DIAGNOSIS — B95.61 METHICILLIN SUSCEPTIBLE STAPHYLOCOCCUS AUREUS INFECTION AS THE CAUSE OF DISEASES CLASSIFIED ELSEWHERE: ICD-10-CM

## 2025-06-04 LAB
AST SERPL W P-5'-P-CCNC: 15 U/L (ref 0–45)
BASOPHILS # BLD AUTO: 0.1 10E3/UL (ref 0–0.2)
BASOPHILS NFR BLD AUTO: 1 %
CREAT SERPL-MCNC: 0.66 MG/DL (ref 0.67–1.17)
EGFRCR SERPLBLD CKD-EPI 2021: >90 ML/MIN/1.73M2
EOSINOPHIL # BLD AUTO: 0.1 10E3/UL (ref 0–0.7)
EOSINOPHIL NFR BLD AUTO: 1 %
ERYTHROCYTE [DISTWIDTH] IN BLOOD BY AUTOMATED COUNT: 12.1 % (ref 10–15)
HCT VFR BLD AUTO: 43.8 % (ref 40–53)
HGB BLD-MCNC: 14.8 G/DL (ref 13.3–17.7)
IMM GRANULOCYTES # BLD: 0 10E3/UL
IMM GRANULOCYTES NFR BLD: 1 %
LYMPHOCYTES # BLD AUTO: 1.4 10E3/UL (ref 0.8–5.3)
LYMPHOCYTES NFR BLD AUTO: 22 %
MCH RBC QN AUTO: 29.6 PG (ref 26.5–33)
MCHC RBC AUTO-ENTMCNC: 33.8 G/DL (ref 31.5–36.5)
MCV RBC AUTO: 88 FL (ref 78–100)
MONOCYTES # BLD AUTO: 0.4 10E3/UL (ref 0–1.3)
MONOCYTES NFR BLD AUTO: 6 %
NEUTROPHILS # BLD AUTO: 4.6 10E3/UL (ref 1.6–8.3)
NEUTROPHILS NFR BLD AUTO: 70 %
NRBC # BLD AUTO: 0 10E3/UL
NRBC BLD AUTO-RTO: 0 /100
PLATELET # BLD AUTO: 192 10E3/UL (ref 150–450)
RBC # BLD AUTO: 5 10E6/UL (ref 4.4–5.9)
WBC # BLD AUTO: 6.5 10E3/UL (ref 4–11)

## 2025-06-04 PROCEDURE — 82565 ASSAY OF CREATININE: CPT | Performed by: INTERNAL MEDICINE

## 2025-06-04 PROCEDURE — 84450 TRANSFERASE (AST) (SGOT): CPT | Performed by: INTERNAL MEDICINE

## 2025-06-04 PROCEDURE — S9502 HIT ANTIBIOTIC Q8H DIEM: HCPCS

## 2025-06-04 PROCEDURE — 85018 HEMOGLOBIN: CPT | Performed by: INTERNAL MEDICINE

## 2025-06-04 PROCEDURE — 85025 COMPLETE CBC W/AUTO DIFF WBC: CPT | Performed by: INTERNAL MEDICINE

## 2025-06-04 NOTE — PROGRESS NOTES
Nursing Visit Note:  Nurse visit today for Labs and PICC pull, and dc  for Deni Coates.     present during visit today: Not Applicable.    Intravenous Access:  Labs drawn without difficulty. and PICC.    Lab-Only Nursing Note    Labs obtained via PICC    Time Specimen drawn: 1240 p    Last dose (if applicable): No    Facility sent to: [facility] Tracking number: [id number] and Discharge Date from [facility] Nursin25    Will patient remain open to Pharmacy Only?: No    Reason for Discharge: Goals Met and Therapy Completed    Additional Comments:   Discharge Disposition: Therapy Discontinued/No further Skilled Nursing    If Transferred to Agency, Name of Agency: N/A    Brief Summary of Admission to Home Infusion:   Reason for Admission: IV ABX  Treatment Provided: skilled nursing for CLC, labs, and teaching on IV ABX  Significant Findings: NA  Discharge Instructions: when to remove dressing over PICC site, monitoring for complications, and when to notify MD.      Note: VSS, patient finished ABX last night.  Denied any changes from last week.  Continues to be feeling well.  All questions answered by RN.     Saline administered: 20 ml  (ml)    Supply Check:   Does the patient have all the supplies they need for the next visit?  Yes    Next visit plan: dc this visit     Margi Nicolas RN 2025

## 2025-06-14 ENCOUNTER — HEALTH MAINTENANCE LETTER (OUTPATIENT)
Age: 40
End: 2025-06-14

## 2025-08-16 ENCOUNTER — HEALTH MAINTENANCE LETTER (OUTPATIENT)
Age: 40
End: 2025-08-16